# Patient Record
Sex: MALE | Race: WHITE | NOT HISPANIC OR LATINO | Employment: OTHER | URBAN - METROPOLITAN AREA
[De-identification: names, ages, dates, MRNs, and addresses within clinical notes are randomized per-mention and may not be internally consistent; named-entity substitution may affect disease eponyms.]

---

## 2017-03-18 DIAGNOSIS — Z00.00 ENCOUNTER FOR ROUTINE ADULT HEALTH EXAMINATION WITHOUT ABNORMAL FINDINGS: ICD-10-CM

## 2017-03-20 RX ORDER — ATORVASTATIN CALCIUM 40 MG/1
TABLET, FILM COATED ORAL
Qty: 90 TABLET | Refills: 0 | Status: SHIPPED | OUTPATIENT
Start: 2017-03-20 | End: 2017-03-28

## 2017-03-20 NOTE — TELEPHONE ENCOUNTER
Lipitor      Last Written Prescription Date: 08/05/2016  Last Fill Quantity: 90, # refills: 1  Last Office Visit with [facility] or [facility] prescribing provider: 11/22/2016       Lab Results   Component Value Date    CHOL 108 10/21/2016     Lab Results   Component Value Date    HDL 37 10/21/2016     Lab Results   Component Value Date    LDL 53 10/21/2016     Lab Results   Component Value Date    TRIG 89 10/21/2016     No results found for: CHOLHDLRATIO

## 2017-03-22 ENCOUNTER — TELEPHONE (OUTPATIENT)
Dept: FAMILY MEDICINE | Facility: CLINIC | Age: 52
End: 2017-03-22

## 2017-03-22 DIAGNOSIS — I10 BENIGN ESSENTIAL HYPERTENSION: Primary | ICD-10-CM

## 2017-03-22 PROCEDURE — 83036 HEMOGLOBIN GLYCOSYLATED A1C: CPT | Performed by: FAMILY MEDICINE

## 2017-03-22 PROCEDURE — 36415 COLL VENOUS BLD VENIPUNCTURE: CPT | Performed by: FAMILY MEDICINE

## 2017-03-22 PROCEDURE — 80061 LIPID PANEL: CPT | Performed by: FAMILY MEDICINE

## 2017-03-22 NOTE — TELEPHONE ENCOUNTER
LS,  Pt came in to lab today requesting labs for upcoming apt with you 3/28, requesting lipids and A1C/glucose- labs started.  Per lab, the blood that they larry is only good until Friday.  Please review/order/advise.  Thanks,  Jocelin Mckeon RN

## 2017-03-23 LAB — HBA1C MFR BLD: 6.1 % (ref 4.3–6)

## 2017-03-24 LAB
CHOLEST SERPL-MCNC: 128 MG/DL
HDLC SERPL-MCNC: 49 MG/DL
LDLC SERPL CALC-MCNC: 55 MG/DL
NONHDLC SERPL-MCNC: 79 MG/DL
TRIGL SERPL-MCNC: 119 MG/DL

## 2017-03-28 ENCOUNTER — OFFICE VISIT (OUTPATIENT)
Dept: FAMILY MEDICINE | Facility: CLINIC | Age: 52
End: 2017-03-28
Payer: COMMERCIAL

## 2017-03-28 VITALS
HEART RATE: 72 BPM | HEIGHT: 72 IN | DIASTOLIC BLOOD PRESSURE: 85 MMHG | SYSTOLIC BLOOD PRESSURE: 126 MMHG | TEMPERATURE: 96.2 F | WEIGHT: 281 LBS | RESPIRATION RATE: 16 BRPM | BODY MASS INDEX: 38.06 KG/M2 | OXYGEN SATURATION: 97 %

## 2017-03-28 DIAGNOSIS — F17.200 TOBACCO USE DISORDER: Primary | ICD-10-CM

## 2017-03-28 DIAGNOSIS — Z00.00 ENCOUNTER FOR ROUTINE ADULT HEALTH EXAMINATION WITHOUT ABNORMAL FINDINGS: ICD-10-CM

## 2017-03-28 PROCEDURE — 99214 OFFICE O/P EST MOD 30 MIN: CPT | Performed by: FAMILY MEDICINE

## 2017-03-28 NOTE — NURSING NOTE
Chief Complaint   Patient presents with     Lipids     /85  Pulse 72  Temp 96.2  F (35.7  C) (Oral)  Resp 16  Ht 6' (1.829 m)  Wt 281 lb (127.5 kg)  SpO2 97%  BMI 38.11 kg/m2 Estimated body mass index is 38.11 kg/(m^2) as calculated from the following:    Height as of this encounter: 6' (1.829 m).    Weight as of this encounter: 281 lb (127.5 kg).  bp completed using cuff size: large       Health Maintenance addressed:  NONE    n/a    Izabel Ashley MA

## 2017-03-28 NOTE — MR AVS SNAPSHOT
After Visit Summary   3/28/2017    Martin Anguiano    MRN: [id number]           Patient Information     Date Of Birth          1965        Visit Information        Provider Department      3/28/2017 12:30 PM Johana Tamez MD [facility]        Today's Diagnoses     Tobacco use disorder    -  1    Encounter for routine adult health examination without abnormal findings           Follow-ups after your visit        Who to contact     If you have questions or need follow up information about today's clinic visit or your schedule please contact [facility] directly at [phone].  Normal or non-critical lab and imaging results will be communicated to you by [employer] Connect[employer], letter or phone within 4 business days after the clinic has received the results. If you do not hear from us within 7 days, please contact the clinic through [employer] or phone. If you have a critical or abnormal lab result, we will notify you by phone as soon as possible.  Submit refill requests through [employer] or call your pharmacy and they will forward the refill request to us. Please allow 3 business days for your refill to be completed.          Additional Information About Your Visit        MyChart Information     [employer] gives you secure access to your electronic health record. If you see a primary care provider, you can also send messages to your care team and make appointments. If you have questions, please call your primary care clinic.  If you do not have a primary care provider, please call [phone] and they will assist you.        Care EveryWhere ID     This is your Care EveryWhere ID. This could be used by other organizations to access your [facility] medical records  [id number]        Your Vitals Were     Pulse Temperature Respirations Height Pulse Oximetry BMI (Body Mass Index)    72 96.2  F (35.7  C) (Oral) 16 6' (1.829 m) 97% 38.11 kg/m2       Blood Pressure from Last 3 Encounters:   03/28/17 126/85    11/22/16 126/70   10/25/16 128/76    Weight from Last 3 Encounters:   03/28/17 281 lb (127.5 kg)   11/22/16 285 lb (129.3 kg)   10/25/16 290 lb (131.5 kg)              Today, you had the following     No orders found for display         Today's Medication Changes          These changes are accurate as of: 3/28/17 11:59 PM.  If you have any questions, ask your nurse or doctor.               Start taking these medicines.        Dose/Directions    varenicline 0.5 MG X 11 & 1 MG X 42 tablet   Commonly known as:  CHANTIX STARTING MONTH PAK   Used for:  Tobacco use disorder   Started by:  Johana Tamez MD        Take 0.5 mg tab daily for 3 days, then 0.5 mg tab twice daily for 4 days, then 1 mg twice daily.   Quantity:  53 tablet   Refills:  0         These medicines have changed or have updated prescriptions.        Dose/Directions    atorvastatin 40 MG tablet   Commonly known as:  LIPITOR   This may have changed:  See the new instructions.   Used for:  Encounter for routine adult health examination without abnormal findings   Changed by:  Johana Tamez MD        Dose:  40 mg   Take 1 tablet (40 mg) by mouth daily   Quantity:  90 tablet   Refills:  1            Where to get your medicines      These [facility] [address] [address] [facility] & [address] [address]     Phone:  [phone]     atorvastatin 40 MG tablet    varenicline 0.5 MG X 11 & 1 MG X 42 tablet                Primary Care Provider Office Phone # Fax #    Johana Tamez [phone]       [facility] [address] [facility] [address] [place] [id number]        Thank you!     Thank you for choosing [facility]  for your care. Our goal is always to provide you with excellent care. Hearing back from our patients is one way we can continue to improve our services. Please take a few minutes to complete the written survey that you may  receive in the mail after your visit with us. Thank you!             Your Updated Medication List - Protect others around you: Learn how to safely use, store and throw away your medicines at www.disposemymeds.org.          This list is accurate as of: 3/28/17 11:59 PM.  Always use your most recent med list.                   Brand Name Dispense Instructions for use    atorvastatin 40 MG tablet    LIPITOR    90 tablet    Take 1 tablet (40 mg) by mouth daily       CRESTOR 10 MG tablet   Generic drug:  rosuvastatin      Take 20 mg by mouth daily       lisinopril 20 MG tablet    PRINIVIL/ZESTRIL         Multi-vitamin Tabs tablet      Take 1 tablet by mouth daily       order for DME      DREAMSTATIO 5-15 CM/H20 NASAL WISP FABRIC       varenicline 0.5 MG X 11 & 1 MG X 42 tablet    CHANTIX STARTING MONTH CLEMENTINA    53 tablet    Take 0.5 mg tab daily for 3 days, then 0.5 mg tab twice daily for 4 days, then 1 mg twice daily.

## 2017-03-28 NOTE — PROGRESS NOTES
SUBJECTIVE:                                                    Martin Anguiano is a 51 year old male who presents to clinic today for the following health issues:      Hyperlipidemia Follow-Up is on 40mg of Lipitor and doing well, no side effects       Rate your low fat/cholesterol diet?: good    Taking statin?  No    Other lipid medications/supplements?:  none       Amount of exercise or physical activity: 4-5 days/week for an average of 30-45 minutes    Problems taking medications regularly: No    Medication side effects: none    Diet: low fat/cholesterol and low sugar           Problem list and histories reviewed & adjusted, as indicated.  Additional history: as documented    Patient Active Problem List   Diagnosis     Benign essential hypertension     JOJO (obstructive sleep apnea)     Family history of diabetes mellitus     Glucose intolerance (impaired glucose tolerance)     Non morbid obesity due to excess calories     Screening for prostate cancer     Mixed hyperlipidemia     Tobacco use disorder [age]y/o @ 1.5ppd for 15 yrs then 1ppd=31-32 pk yr hx     Chronic coronary artery disease     Impotence of organic origin     Family history of coronary artery disease     Gastroesophageal reflux disease     History of vasectomy     Hypercholesterolemia     Vitamin D deficiency     Cellulitis and abscess of leg-healing and closing      Past Surgical History:   Procedure Laterality Date     NO HISTORY OF SURGERY         Social History   Substance Use Topics     Smoking status: Former Smoker     Packs/day: 1.00     Years: 20.00     Types: Cigarettes     Quit date: 10/4/2016     Smokeless tobacco: Never Used      Comment: last 10 years were off and on, about 1/2016     Alcohol use 0.0 oz/week     0 Standard drinks or equivalent per week      Comment: 1-2 times per week, 2 drinks     Family History   Problem Relation Age of Onset     DIABETES Mother      DIABETES Brother      Hypertension Father      Hyperlipidemia Father   "    Coronary Artery Disease No family hx of      CEREBROVASCULAR DISEASE No family hx of      Breast Cancer No family hx of      Colon Cancer No family hx of      Prostate Cancer No family hx of      Other Cancer No family hx of      Depression No family hx of      Anxiety Disorder No family hx of      MENTAL ILLNESS No family hx of      Substance Abuse No family hx of      Anesthesia Reaction No family hx of      Asthma No family hx of      OSTEOPOROSIS No family hx of      Genetic Disorder No family hx of      Thyroid Disease No family hx of      Obesity No family hx of      Unknown/Adopted No family hx of          Current Outpatient Prescriptions   Medication Sig Dispense Refill     varenicline (CHANTIX STARTING MONTH PAK) 0.5 MG X 11 & 1 MG X 42 tablet Take 0.5 mg tab daily for 3 days, then 0.5 mg tab twice daily for 4 days, then 1 mg twice daily. 53 tablet 0     atorvastatin (LIPITOR) 40 MG tablet TAKE 1 TABLET BY MOUTH EVERY DAY. 90 tablet 0     order for DME DREAMSTATIO  5-15 CM/H20  NASAL WISP FABRIC       multivitamin, therapeutic with minerals (MULTI-VITAMIN) TABS Take 1 tablet by mouth daily       lisinopril (PRINIVIL,ZESTRIL) 20 MG tablet   5     rosuvastatin (CRESTOR) 10 MG tablet Take 20 mg by mouth daily        Allergies   Allergen Reactions     Ibuprofen      Other reaction(s): Other - Describe In Comment Field  Uri type of symptoms; \"watery eyes\". Gel caps are  OK  Eyes water,runny nose     Lisinopril Cough     Recent Labs   Lab Test  03/22/17   1145  10/21/16   1201  03/08/16   0944   A1C  6.1*  6.3*  5.8   LDL  55  53  61   HDL  49  37*  39*   TRIG  119  89  179*   ALT   --   63  64   CR   --   1.13  0.97   GFRESTIMATED   --   69  83   GFRESTBLACK   --   83  >90   GFR Calc     POTASSIUM   --   4.4  4.3      BP Readings from Last 3 Encounters:   03/28/17 126/85   11/22/16 126/70   10/25/16 128/76    Wt Readings from Last 3 Encounters:   03/28/17 281 lb (127.5 kg)   11/22/16 285 lb " (129.3 kg)   10/25/16 290 lb (131.5 kg)                  Labs reviewed in EPIC    Reviewed and updated as needed this visit by clinical staff  Tobacco  Allergies  Meds  Med Hx  Surg Hx  Fam Hx  Soc Hx      Reviewed and updated as needed this visit by Provider         ROS:  Constitutional, HEENT, cardiovascular, pulmonary, GI, , musculoskeletal, neuro, skin, endocrine and psych systems are negative, except as otherwise noted.    OBJECTIVE:                                                    /85  Pulse 72  Temp 96.2  F (35.7  C) (Oral)  Resp 16  Ht 6' (1.829 m)  Wt 281 lb (127.5 kg)  SpO2 97%  BMI 38.11 kg/m2  Body mass index is 38.11 kg/(m^2).  GENERAL: healthy, alert and no distress  EYES: Eyes grossly normal to inspection, PERRL and conjunctivae and sclerae normal  NECK: no adenopathy, no asymmetry, masses, or scars and thyroid normal to palpation  RESP: lungs clear to auscultation - no rales, rhonchi or wheezes  CV: regular rate and rhythm, normal S1 S2, no S3 or S4, no murmur, click or rub, no peripheral edema and peripheral pulses strong  ABDOMEN: soft, nontender, no hepatosplenomegaly, no masses and bowel sounds normal  MS: no gross musculoskeletal defects noted, no edema  NEURO: Normal strength and tone, mentation intact and speech normal    Diagnostic Test Results:  Results for orders placed or performed in visit on 03/22/17   Lipid Profile with reflex to direct LDL   Result Value Ref Range    Cholesterol 128 <200 mg/dL    Triglycerides 119 <150 mg/dL    HDL Cholesterol 49 >39 mg/dL    LDL Cholesterol Calculated 55 <100 mg/dL    Non HDL Cholesterol 79 <130 mg/dL   Hemoglobin A1c   Result Value Ref Range    Hemoglobin A1C 6.1 (H) 4.3 - 6.0 %        ASSESSMENT/PLAN:                                                            1. Tobacco use disorder  We discussed quitting smoking , he will start the chantix, he has done it before with chantix and worked well,   - varenicline (CHANTIX STARTING  MONTH CLEMENTINA) 0.5 MG X 11 & 1 MG X 42 tablet; Take 0.5 mg tab daily for 3 days, then 0.5 mg tab twice daily for 4 days, then 1 mg twice daily.  Dispense: 53 tablet; Refill: 0    2. Encounter for routine adult health examination without abnormal findings  We went over his labs lipids , will continue with the lipitor ,   - atorvastatin (LIPITOR) 40 MG tablet;   Dispense: 90 tablet; Refill: 0    He has stopped his HTN meds, seems that with diet and exercise he is able to maintain it low , also now that he is willing to quit smoking that will help too, will need to check his BP daily and bring the log book with him at next appointment , in three months ,   RTC if no improving or worsening.    Johana Tamez MD  [facility]

## 2017-03-29 RX ORDER — ATORVASTATIN CALCIUM 40 MG/1
40 TABLET, FILM COATED ORAL DAILY
Qty: 90 TABLET | Refills: 1 | Status: SHIPPED | OUTPATIENT
Start: 2017-03-29 | End: 2018-01-03

## 2017-04-06 ENCOUNTER — TELEPHONE (OUTPATIENT)
Dept: FAMILY MEDICINE | Facility: CLINIC | Age: 52
End: 2017-04-06

## 2017-04-06 NOTE — TELEPHONE ENCOUNTER
Initiated prior auth by filling out form for Chantix.  Form faxed and sent for scanning.   [employer] ph# [phone]  ID# [id number]  [facility] [address] ph# [phone]    Status:  Awaiting response.

## 2017-04-10 NOTE — TELEPHONE ENCOUNTER
[employer] approved coverage of Chantix Starting Cosme through 9/22/2017.  Pharmacy notified.  Letter sent for scanning.

## 2017-05-04 ENCOUNTER — TELEPHONE (OUTPATIENT)
Dept: FAMILY MEDICINE | Facility: CLINIC | Age: 52
End: 2017-05-04

## 2017-05-04 DIAGNOSIS — F17.200 TOBACCO USE DISORDER: Primary | ICD-10-CM

## 2017-05-04 RX ORDER — VARENICLINE TARTRATE 1 MG/1
1 TABLET, FILM COATED ORAL 2 TIMES DAILY
Qty: 56 TABLET | Refills: 2 | Status: SHIPPED | OUTPATIENT
Start: 2017-05-04 | End: 2018-05-31

## 2017-05-04 NOTE — TELEPHONE ENCOUNTER
Reason for Call:  Medication or medication refill:    Do you use a [facility]?  Name of the pharmacy and phone number for the current request:       [facility] - [address] [facility] AT [facility] [employer] [place]      Name of the medication requested: Chantix 21mg, 14mg 7mg    Other request: Please call once approved    Can we leave a detailed message on this number? Yes    Phone number patient can be reached at: Home number on file [phone] (home)    Best Time: anytime    Call taken on 5/4/2017 at 8:26 AM by David Mcgill

## 2017-05-04 NOTE — TELEPHONE ENCOUNTER
LS - FYI  Patient called.  Chantix working well. Hasn't smoked in 3 weeks!  Sent in Chantix continuation Rx.  Thanks, Christina Guo RN

## 2017-05-16 NOTE — TELEPHONE ENCOUNTER
[facility] approved coverage of Chantix Continuing sofya through 9/28/2017.  Case # [id number]  [facility] phone # [phone]

## 2017-05-24 ENCOUNTER — CARE COORDINATION (OUTPATIENT)
Dept: CARE COORDINATION | Facility: CLINIC | Age: 52
End: 2017-05-24

## 2017-05-24 NOTE — PROGRESS NOTES
Clinic Care Coordination Contact    Situation: Patient chart reviewed by care coordinator.    Background: Proactive outreach via IHP report    Assessment: 52 year old male with obstructive sleep apnea, hypertension and history of diabetes in his family.  Patient has had not ED visits or inpatient stays in last several years. Follow with PCP at [facility] clinic on a regular basis.      Plan/Recommendations: Clinic care coordination is not indicated at this time.      Fatoumata Orantes RN, CCM - Care Coordinator     5/24/2017    4:02 PM  [phone]

## 2017-06-21 DIAGNOSIS — F17.200 TOBACCO USE DISORDER: ICD-10-CM

## 2017-06-21 DIAGNOSIS — Z00.00 ENCOUNTER FOR ROUTINE ADULT HEALTH EXAMINATION WITHOUT ABNORMAL FINDINGS: ICD-10-CM

## 2017-06-22 RX ORDER — ATORVASTATIN CALCIUM 40 MG/1
TABLET, FILM COATED ORAL
Refills: 0
Start: 2017-06-22

## 2017-06-22 NOTE — TELEPHONE ENCOUNTER
Atorvastatin     Last Written Prescription Date: 3-29-17  Last Fill Quantity: 90, # refills: 1  [facility] or [facility] prescribing provider: 3-28-17       Lab Results   Component Value Date    CHOL 128 03/22/2017     Lab Results   Component Value Date    HDL 49 03/22/2017     Lab Results   Component Value Date    LDL 55 03/22/2017     Lab Results   Component Value Date    TRIG 119 03/22/2017     No results found for: CHOLHDLRATIO    Chantix     Last Written Prescription Date: 3-28-17  Last Fill Quantity: 53, # refills: 0  Last Office Visit with [facility] or [facility] prescribing provider:  3-28-17     BP Readings from Last 3 Encounters:   03/28/17 126/85   11/22/16 126/70   10/25/16 128/76

## 2017-11-06 DIAGNOSIS — G47.33 OSA (OBSTRUCTIVE SLEEP APNEA): Primary | ICD-10-CM

## 2018-01-03 DIAGNOSIS — Z00.00 ENCOUNTER FOR ROUTINE ADULT HEALTH EXAMINATION WITHOUT ABNORMAL FINDINGS: ICD-10-CM

## 2018-01-04 RX ORDER — ATORVASTATIN CALCIUM 40 MG/1
TABLET, FILM COATED ORAL
Start: 2018-01-04

## 2018-01-04 RX ORDER — ATORVASTATIN CALCIUM 40 MG/1
TABLET, FILM COATED ORAL
Qty: 90 TABLET | Refills: 0 | Status: SHIPPED | OUTPATIENT
Start: 2018-01-04 | End: 2018-04-07

## 2018-01-04 NOTE — TELEPHONE ENCOUNTER
Prescription approved per [facility] Refill Protocol.  Erika DEAN RN    Requested Prescriptions   Pending Prescriptions Disp Refills     atorvastatin (LIPITOR) 40 MG tablet [Pharmacy Med Name: ATORVASTATIN 40MG TABLETS] 90 tablet 0     Sig: TAKE 1 TABLET(40 MG) BY MOUTH DAILY    Statins Protocol Passed    1/3/2018  7:39 PM       Passed - LDL on file in past 12 months    Recent Labs   Lab Test  03/22/17   1145   LDL  55            Passed - No abnormal creatine kinase in past 12 months    No lab results found.         Passed - Recent or future visit with authorizing provider    Patient had office visit in the last year or has a visit in the next 30 days with authorizing provider.  See chart review.              Passed - Patient is age 18 or older        atorvastatin (LIPITOR) 40 MG tablet [Pharmacy Med Name: ATORVASTATIN 40MG TABLETS] 90 tablet 0     Sig: TAKE 1 TABLET(40 MG) BY MOUTH DAILY    Statins Protocol Passed    1/3/2018  7:39 PM       Passed - LDL on file in past 12 months    Recent Labs   Lab Test  03/22/17   1145   LDL  55            Passed - No abnormal creatine kinase in past 12 months    No lab results found.         Passed - Recent or future visit with authorizing provider    Patient had office visit in the last year or has a visit in the next 30 days with authorizing provider.  See chart review.              Passed - Patient is age 18 or older

## 2018-04-07 DIAGNOSIS — Z00.00 ENCOUNTER FOR ROUTINE ADULT HEALTH EXAMINATION WITHOUT ABNORMAL FINDINGS: ICD-10-CM

## 2018-04-09 RX ORDER — ATORVASTATIN CALCIUM 40 MG/1
TABLET, FILM COATED ORAL
Qty: 30 TABLET | Refills: 0 | Status: SHIPPED | OUTPATIENT
Start: 2018-04-09 | End: 2018-05-08

## 2018-04-09 NOTE — TELEPHONE ENCOUNTER
"Requested Prescriptions   Pending Prescriptions Disp Refills     atorvastatin (LIPITOR) 40 MG tablet [Pharmacy Med Name: ATORVASTATIN 40MG TABLETS] 90 tablet 0     Sig: TAKE 1 TABLET(40 MG) BY MOUTH DAILY    Statins Protocol Failed    4/7/2018  5:31 PM       Failed - LDL on file in past 12 months    Recent Labs   Lab Test  03/22/17   1145   LDL  55            Failed - Recent (12 mo) or future (30 days) visit within the authorizing provider's specialty    Patient had office visit in the last 12 months or has a visit in the next 30 days with authorizing provider or within the authorizing provider's specialty.  See \"Patient Info\" tab in inbasket, or \"Choose Columns\" in Meds & Orders section of the refill encounter.           Passed - No abnormal creatine kinase in past 12 months    No lab results found.            Passed - Patient is age 18 or older        "

## 2018-04-09 NOTE — TELEPHONE ENCOUNTER
Medication is being filled for 1 time refill only due to:  Patient needs to be seen because it has been more than one year since last visit.   Due for fasting physical  Erika DEAN RN

## 2018-04-20 DIAGNOSIS — Z00.00 ENCOUNTER FOR ROUTINE ADULT HEALTH EXAMINATION WITHOUT ABNORMAL FINDINGS: ICD-10-CM

## 2018-04-20 RX ORDER — ATORVASTATIN CALCIUM 40 MG/1
TABLET, FILM COATED ORAL
Refills: 0
Start: 2018-04-20

## 2018-04-20 NOTE — TELEPHONE ENCOUNTER
Was given 30 on 4/9. Due for due for appointment (fasting physical).  Added in pharm comments.  Jocelin Mckeon RN

## 2018-05-08 ENCOUNTER — OFFICE VISIT (OUTPATIENT)
Dept: FAMILY MEDICINE | Facility: CLINIC | Age: 53
End: 2018-05-08
Payer: COMMERCIAL

## 2018-05-08 VITALS
BODY MASS INDEX: 39.62 KG/M2 | WEIGHT: 283 LBS | TEMPERATURE: 98 F | DIASTOLIC BLOOD PRESSURE: 88 MMHG | HEART RATE: 61 BPM | SYSTOLIC BLOOD PRESSURE: 156 MMHG | OXYGEN SATURATION: 98 % | HEIGHT: 71 IN | RESPIRATION RATE: 16 BRPM

## 2018-05-08 DIAGNOSIS — Z83.3 FAMILY HISTORY OF DIABETES MELLITUS: ICD-10-CM

## 2018-05-08 DIAGNOSIS — Z00.00 ENCOUNTER FOR ROUTINE ADULT HEALTH EXAMINATION WITHOUT ABNORMAL FINDINGS: ICD-10-CM

## 2018-05-08 DIAGNOSIS — R05.9 COUGH: ICD-10-CM

## 2018-05-08 DIAGNOSIS — I10 BENIGN ESSENTIAL HYPERTENSION: Primary | ICD-10-CM

## 2018-05-08 DIAGNOSIS — Z71.6 ENCOUNTER FOR SMOKING CESSATION COUNSELING: ICD-10-CM

## 2018-05-08 DIAGNOSIS — E78.00 HYPERCHOLESTEROLEMIA: ICD-10-CM

## 2018-05-08 LAB
ALBUMIN SERPL-MCNC: 4.3 G/DL (ref 3.4–5)
ALP SERPL-CCNC: 86 U/L (ref 40–150)
ALT SERPL W P-5'-P-CCNC: 68 U/L (ref 0–70)
ANION GAP SERPL CALCULATED.3IONS-SCNC: 7 MMOL/L (ref 3–14)
AST SERPL W P-5'-P-CCNC: 38 U/L (ref 0–45)
BILIRUB SERPL-MCNC: 0.8 MG/DL (ref 0.2–1.3)
BUN SERPL-MCNC: 13 MG/DL (ref 7–30)
CALCIUM SERPL-MCNC: 9.3 MG/DL (ref 8.5–10.1)
CHLORIDE SERPL-SCNC: 106 MMOL/L (ref 94–109)
CHOLEST SERPL-MCNC: 121 MG/DL
CO2 SERPL-SCNC: 26 MMOL/L (ref 20–32)
CREAT SERPL-MCNC: 0.98 MG/DL (ref 0.66–1.25)
GFR SERPL CREATININE-BSD FRML MDRD: 80 ML/MIN/1.7M2
GLUCOSE SERPL-MCNC: 123 MG/DL (ref 70–99)
HBA1C MFR BLD: 6.3 % (ref 0–5.6)
HDLC SERPL-MCNC: 40 MG/DL
LDLC SERPL CALC-MCNC: 55 MG/DL
NONHDLC SERPL-MCNC: 81 MG/DL
POTASSIUM SERPL-SCNC: 4.4 MMOL/L (ref 3.4–5.3)
PROT SERPL-MCNC: 7.8 G/DL (ref 6.8–8.8)
SODIUM SERPL-SCNC: 139 MMOL/L (ref 133–144)
TRIGL SERPL-MCNC: 132 MG/DL

## 2018-05-08 PROCEDURE — 83036 HEMOGLOBIN GLYCOSYLATED A1C: CPT | Performed by: FAMILY MEDICINE

## 2018-05-08 PROCEDURE — 36415 COLL VENOUS BLD VENIPUNCTURE: CPT | Performed by: FAMILY MEDICINE

## 2018-05-08 PROCEDURE — 99214 OFFICE O/P EST MOD 30 MIN: CPT | Performed by: FAMILY MEDICINE

## 2018-05-08 PROCEDURE — 80053 COMPREHEN METABOLIC PANEL: CPT | Performed by: FAMILY MEDICINE

## 2018-05-08 PROCEDURE — 80061 LIPID PANEL: CPT | Performed by: FAMILY MEDICINE

## 2018-05-08 RX ORDER — ALBUTEROL SULFATE 90 UG/1
2 AEROSOL, METERED RESPIRATORY (INHALATION) EVERY 6 HOURS PRN
Qty: 1 INHALER | Refills: 0 | Status: SHIPPED | OUTPATIENT
Start: 2018-05-08 | End: 2018-05-26

## 2018-05-08 RX ORDER — LOSARTAN POTASSIUM 50 MG/1
50 TABLET ORAL DAILY
Qty: 30 TABLET | Refills: 2 | Status: SHIPPED | OUTPATIENT
Start: 2018-05-08 | End: 2018-05-25

## 2018-05-08 RX ORDER — ATORVASTATIN CALCIUM 40 MG/1
TABLET, FILM COATED ORAL
Qty: 90 TABLET | Refills: 1 | Status: SHIPPED | OUTPATIENT
Start: 2018-05-08 | End: 2018-11-14

## 2018-05-08 NOTE — PROGRESS NOTES
SUBJECTIVE:   Martin Anguiano is a 52 year old male who presents to clinic today for the following health issues:      Medication Followup of Lipitor     Taking Medication as prescribed: yes    Side Effects:  None    Medication Helping Symptoms:  Unsure    He has been on Lipitor 40mg and here for recheck fasting lipids   Patient would like a refill of the Chantix.     2) HTN , seems that BP is high again , he usually does not check at home , not sure how it is at home , he quit taking his lisinopril a year ago becUSE OF COUGH and did not start another at that time .  3) smoking cessation , he quit on Chantix a year ago and weas off cigarettes for a while and then re started four months agpo , smokes about half a pack per day but wants to quit again , had success with the Chantix and no side effects so wants to try it again   4) FM of Dm and needs to check glucose and HGb a 1 C   Problem list and histories reviewed & adjusted, as indicated.  Additional history: as documented    Patient Active Problem List   Diagnosis     Benign essential hypertension     JOJO (obstructive sleep apnea)     Family history of diabetes mellitus     Glucose intolerance (impaired glucose tolerance)     Non morbid obesity due to excess calories     Screening for prostate cancer     Mixed hyperlipidemia     Tobacco use disorder [age]y/o @ 1.5ppd for 15 yrs then 1ppd=31-32 pk yr hx     Chronic coronary artery disease     Impotence of organic origin     Family history of coronary artery disease     Gastroesophageal reflux disease     History of vasectomy     Hypercholesterolemia     Vitamin D deficiency     Cellulitis and abscess of leg-healing and closing      Past Surgical History:   Procedure Laterality Date     NO HISTORY OF SURGERY         Social History   Substance Use Topics     Smoking status: Former Smoker     Packs/day: 1.00     Years: 20.00     Types: Cigarettes     Quit date: 10/4/2016     Smokeless tobacco: Never Used      Comment:  last 10 years were off and on, about 1/2016     Alcohol use 0.0 oz/week     0 Standard drinks or equivalent per week      Comment: 1-2 times per week, 2 drinks     Family History   Problem Relation Age of Onset     DIABETES Mother      DIABETES Brother      Hypertension Father      Hyperlipidemia Father      Coronary Artery Disease No family hx of      CEREBROVASCULAR DISEASE No family hx of      Breast Cancer No family hx of      Colon Cancer No family hx of      Prostate Cancer No family hx of      Other Cancer No family hx of      Depression No family hx of      Anxiety Disorder No family hx of      MENTAL ILLNESS No family hx of      Substance Abuse No family hx of      Anesthesia Reaction No family hx of      Asthma No family hx of      OSTEOPOROSIS No family hx of      Genetic Disorder No family hx of      Thyroid Disease No family hx of      Obesity No family hx of      Unknown/Adopted No family hx of          Current Outpatient Prescriptions   Medication Sig Dispense Refill     albuterol (PROAIR HFA/PROVENTIL HFA/VENTOLIN HFA) 108 (90 Base) MCG/ACT Inhaler Inhale 2 puffs into the lungs every 6 hours as needed for shortness of breath / dyspnea or wheezing 1 Inhaler 0     atorvastatin (LIPITOR) 40 MG tablet TAKE 1 TABLET(40 MG) BY MOUTH DAILY 90 tablet 1     losartan (COZAAR) 50 MG tablet Take 1 tablet (50 mg) by mouth daily 30 tablet 2     multivitamin, therapeutic with minerals (MULTI-VITAMIN) TABS Take 1 tablet by mouth daily       rosuvastatin (CRESTOR) 10 MG tablet Take 20 mg by mouth daily        varenicline (CHANTIX STARTING MONTH CLEMENTINA) 0.5 MG X 11 & 1 MG X 42 tablet Take 0.5 mg tab daily for 3 days, then 0.5 mg tab twice daily for 4 days, then 1 mg twice daily. 53 tablet 2     lisinopril (PRINIVIL,ZESTRIL) 20 MG tablet   5     order for DME DREAMSTATIO  5-15 CM/H20  NASAL WISP FABRIC       varenicline (CHANTIX STARTING MONTH CLEMENTINA) 0.5 MG X 11 & 1 MG X 42 tablet Take 0.5 mg tab daily for 3 days, then 0.5  "mg tab twice daily for 4 days, then 1 mg twice daily. (Patient not taking: Reported on 5/8/2018) 53 tablet 0     varenicline (CHANTIX) 1 MG tablet Take 1 tablet (1 mg) by mouth 2 times daily (Patient not taking: Reported on 5/8/2018) 56 tablet 2     [DISCONTINUED] atorvastatin (LIPITOR) 40 MG tablet TAKE 1 TABLET(40 MG) BY MOUTH DAILY 30 tablet 0     Allergies   Allergen Reactions     Ibuprofen      Other reaction(s): Other - Describe In Comment Field  Uri type of symptoms; \"watery eyes\". Gel caps are  OK  Eyes water,runny nose     Lisinopril Cough     Recent Labs   Lab Test  05/08/18   1132  03/22/17   1145  10/21/16   1201  03/08/16   0944   A1C  6.3*  6.1*  6.3*  5.8   LDL   --   55  53  61   HDL   --   49  37*  39*   TRIG   --   119  89  179*   ALT   --    --   63  64   CR   --    --   1.13  0.97   GFRESTIMATED   --    --   69  83   GFRESTBLACK   --    --   83  >90   GFR Calc     POTASSIUM   --    --   4.4  4.3      BP Readings from Last 3 Encounters:   05/08/18 156/88   03/28/17 126/85   11/22/16 126/70    Wt Readings from Last 3 Encounters:   05/08/18 283 lb (128.4 kg)   03/28/17 281 lb (127.5 kg)   11/22/16 285 lb (129.3 kg)               Labs reviewed in EPIC    Reviewed and updated as needed this visit by clinical staff       Reviewed and updated as needed this visit by Provider         ROS:  Constitutional, HEENT, cardiovascular, pulmonary, GI, , musculoskeletal, neuro, skin, endocrine and psych systems are negative, except as otherwise noted.    OBJECTIVE:     /88 (BP Location: Left arm, Patient Position: Sitting, Cuff Size: Adult Large)  Pulse 61  Temp 98  F (36.7  C) (Oral)  Resp 16  Ht 5' 10.75\" (1.797 m)  Wt 283 lb (128.4 kg)  SpO2 98%  BMI 39.75 kg/m2  Body mass index is 39.75 kg/(m^2).  GENERAL: healthy, alert and no distress  EYES: Eyes grossly normal to inspection, PERRL and conjunctivae and sclerae normal  HENT: ear canals and TM's normal, nose and mouth without " ulcers or lesions  NECK: no adenopathy, no asymmetry, masses, or scars and thyroid normal to palpation  RESP: lungs clear to auscultation - no rales, rhonchi or wheezes  CV: regular rate and rhythm, normal S1 S2, no S3 or S4, no murmur, click or rub, no peripheral edema and peripheral pulses strong  ABDOMEN: soft, nontender, no hepatosplenomegaly, no masses and bowel sounds normal  MS: no gross musculoskeletal defects noted, no edema  NEURO: Normal strength and tone, mentation intact and speech normal    Diagnostic Test Results:  Results for orders placed or performed in visit on 05/08/18 (from the past 24 hour(s))   Hemoglobin A1c   Result Value Ref Range    Hemoglobin A1C 6.3 (H) 0 - 5.6 %       ASSESSMENT/PLAN:       1. Hypercholesterolemia  He is fasting today for recheck of lipids and refilled his lipitor 40mg , will check labs today .  - Lipid Profile (Chol, Trig, HDL, LDL calc)  - Comprehensive metabolic panel (BMP + Alb, Alk Phos, ALT, AST, Total. Bili, TP)  - atorvastatin (LIPITOR) 40 MG tablet; TAKE 1 TABLET(40 MG) BY MOUTH DAILY  Dispense: 90 tablet; Refill: 1    2. Benign essential hypertension  Discussed starting losartan as his BP is high and he has been off lisinopril for a year, would need to start checking his BP at home and come here for a f/u on the bp in two weeks , sooner if any concerns.  - GASTROENTEROLOGY ADULT REF PROCEDURE ONLY [facility] [phone]; No Provider Preference  - Comprehensive metabolic panel (BMP + Alb, Alk Phos, ALT, AST, Total. Bili, TP)  - losartan (COZAAR) 50 MG tablet; Take 1 tablet (50 mg) by mouth daily  Dispense: 30 tablet; Refill: 2    3. Family history of diabetes mellitus  Will check glucose and also HGb A 1 c today , also he is due for the colonoscopy and I have given him the referral .  - GASTROENTEROLOGY ADULT REF PROCEDURE ONLY [facility] [phone]; No Provider Preference  - Hemoglobin A1c    4. Encounter for smoking cessation  counseling  Discussed smoking cessation , he has quit on Chantix in the past and wants to do it again , sent a Rx , would f/u in one month .  - varenicline (CHANTIX STARTING MONTH CLEMENTINA) 0.5 MG X 11 & 1 MG X 42 tablet; Take 0.5 mg tab daily for 3 days, then 0.5 mg tab twice daily for 4 days, then 1 mg twice daily.  Dispense: 53 tablet; Refill: 2    5. Encounter for routine adult health examination without abnormal findings  Refill on his LIpitor and check lipids today   - atorvastatin (LIPITOR) 40 MG tablet; TAKE 1 TABLET(40 MG) BY MOUTH DAILY  Dispense: 90 tablet; Refill: 1    6. Cough  Refill on the Albuterol  - albuterol (PROAIR HFA/PROVENTIL HFA/VENTOLIN HFA) 108 (90 Base) MCG/ACT Inhaler; Inhale 2 puffs into the lungs every 6 hours as needed for shortness of breath / dyspnea or wheezing  Dispense: 1 Inhaler; Refill: 0    RTC if no improving or worsening.  Pt is aware  and comfortable with the current plan.      Johana Tamez MD  [facility]

## 2018-05-08 NOTE — MR AVS SNAPSHOT
After Visit Summary   5/8/2018    Martin Anguiano    MRN: [id number]           Patient Information     Date Of Birth          1965        Visit Information        Provider Department      5/8/2018 11:00 AM Johana Tamez MD [facility]        Today's Diagnoses     Benign essential hypertension    -  1    Hypercholesterolemia        Family history of diabetes mellitus        Encounter for smoking cessation counseling        Encounter for routine adult health examination without abnormal findings           Follow-ups after your visit        Additional Services     GASTROENTEROLOGY ADULT REF PROCEDURE ONLY [facility] [phone]; No Provider Preference       Last Lab Result: Creatinine (mg/dL)       Date                     Value                 10/21/2016               1.13             ----------  Body mass index is 39.75 kg/(m^2).     Needed:  No  Language:  English    Patient will be contacted to schedule procedure.     Please be aware that coverage of these services is subject to the terms and limitations of your health insurance plan.  Call member services at your health plan with any benefit or coverage questions.  Any procedures must be performed at a [place] facility OR coordinated by your clinic's referral office.    Please bring the following with you to your appointment:    (1) Any X-Rays, CTs or MRIs which have been performed.  Contact the facility where they were done to arrange for  prior to your scheduled appointment.    (2) List of current medications   (3) This referral request   (4) Any documents/labs given to you for this referral                  Who to contact     If you have questions or need follow up information about today's clinic visit or your schedule please contact [facility] directly at [phone].  Normal or non-critical lab and imaging results will be communicated to you by MyChart, letter or phone within 4 business  "days after the clinic has received the results. If you do not hear from us within 7 days, please contact the clinic through [employer] or phone. If you have a critical or abnormal lab result, we will notify you by phone as soon as possible.  Submit refill requests through [employer] or call your pharmacy and they will forward the refill request to us. Please allow 3 business days for your refill to be completed.          Additional Information About Your Visit        [employer] Information     [employer] gives you secure access to your electronic health record. If you see a primary care provider, you can also send messages to your care team and make appointments. If you have questions, please call your primary care clinic.  If you do not have a primary care provider, please call [phone] and they will assist you.        Care EveryWhere ID     This is your Care EveryWhere ID. This could be used by other organizations to access your [facility] medical records  [id number]        Your Vitals Were     Pulse Temperature Respirations Height Pulse Oximetry BMI (Body Mass Index)    61 98  F (36.7  C) (Oral) 16 5' 10.75\" (1.797 m) 98% 39.75 kg/m2       Blood Pressure from Last 3 Encounters:   05/08/18 156/88   03/28/17 126/85   11/22/16 126/70    Weight from Last 3 Encounters:   05/08/18 283 lb (128.4 kg)   03/28/17 281 lb (127.5 kg)   11/22/16 285 lb (129.3 kg)              We Performed the Following     Comprehensive metabolic panel (BMP + Alb, Alk Phos, ALT, AST, Total. Bili, TP)     GASTROENTEROLOGY ADULT REF PROCEDURE ONLY [facility] [phone]; No Provider Preference     Hemoglobin A1c     Lipid Profile (Chol, Trig, HDL, LDL calc)          Today's Medication Changes          These changes are accurate as of 5/8/18 11:30 AM.  If you have any questions, ask your nurse or doctor.               Start taking these medicines.        Dose/Directions    losartan 50 MG tablet   Commonly known as:  COZAAR   Used for:  Benign " essential hypertension   Started by:  Johana Tamez MD        Dose:  50 mg   Take 1 tablet (50 mg) by mouth daily   Quantity:  30 tablet   Refills:  2         These medicines have changed or have updated prescriptions.        Dose/Directions    atorvastatin 40 MG tablet   Commonly known as:  LIPITOR   This may have changed:  See the new instructions.   Used for:  Encounter for routine adult health examination without abnormal findings, Hypercholesterolemia   Changed by:  Johana Tamez MD        TAKE 1 TABLET(40 MG) BY MOUTH DAILY   Quantity:  90 tablet   Refills:  1       * varenicline 0.5 MG X 11 & 1 MG X 42 tablet   Commonly known as:  CHANTIX STARTING MONTH CLEMENTINA   This may have changed:  Another medication with the same name was added. Make sure you understand how and when to take each.   Used for:  Tobacco use disorder   Changed by:  Johana Tamez MD        Take 0.5 mg tab daily for 3 days, then 0.5 mg tab twice daily for 4 days, then 1 mg twice daily.   Quantity:  53 tablet   Refills:  0       * varenicline 1 MG tablet   Commonly known as:  CHANTIX   This may have changed:  Another medication with the same name was added. Make sure you understand how and when to take each.   Used for:  Tobacco use disorder   Changed by:  Johana Tamez MD        Dose:  1 mg   Take 1 tablet (1 mg) by mouth 2 times daily   Quantity:  56 tablet   Refills:  2       * varenicline 0.5 MG X 11 & 1 MG X 42 tablet   Commonly known as:  CHANTIX STARTING MONTH CLEMENTINA   This may have changed:  You were already taking a medication with the same name, and this prescription was added. Make sure you understand how and when to take each.   Used for:  Encounter for smoking cessation counseling   Changed by:  Johana Tamez MD        Take 0.5 mg tab daily for 3 days, then 0.5 mg tab twice daily for 4 days, then 1 mg twice daily.   Quantity:  53 tablet   Refills:  2       * Notice:  This list has 3 medication(s) that are the same as other  medications prescribed for you. Read the directions carefully, and ask your doctor or other care provider to review them with you.         Where to get your medicines      These medications were sent to [facility] [id number]  [address] AT [facility] & [place] [address] [place] [address]     Phone:  [phone]     atorvastatin 40 MG tablet    losartan 50 MG tablet    varenicline 0.5 MG X 11 & 1 MG X 42 tablet                Primary Care Provider Office Phone # Fax #    Johana Tamez [phone] [address]   [facility] [address]        Equal Access to Services     RYAN SIN : Hadii solo brooks hadasho Sodotty, waaxda luqadaha, qaybta kaalmada adeegyada, kimi bazan. So [facility] [phone].    ATENCIÓN: Si habla español, tiene a ambrosio disposición servicios gratuitos de asistencia lingüística. Llame al [phone].    We comply with applicable federal civil rights laws and [place] laws. We do not discriminate on the basis of race, color, national origin, age, disability, sex, sexual orientation, or gender identity.            Thank you!     Thank you for choosing [facility]  for your care. Our goal is always to provide you with excellent care. Hearing back from our patients is one way we can continue to improve our services. Please take a few minutes to complete the written survey that you may receive in the mail after your visit with us. Thank you!             Your Updated Medication List - Protect others around you: Learn how to safely use, store and throw away your medicines at www.disposemymeds.org.          This list is accurate as of 5/8/18 11:30 AM.  Always use your most recent med list.                   Brand Name Dispense Instructions for use Diagnosis    atorvastatin 40 MG tablet    LIPITOR    90 tablet    TAKE 1 TABLET(40 MG) BY MOUTH DAILY    Encounter for routine adult health  examination without abnormal findings, Hypercholesterolemia       CRESTOR 10 MG tablet   Generic drug:  rosuvastatin      Take 20 mg by mouth daily        lisinopril 20 MG tablet    PRINIVIL/ZESTRIL          losartan 50 MG tablet    COZAAR    30 tablet    Take 1 tablet (50 mg) by mouth daily    Benign essential hypertension       Multi-vitamin Tabs tablet      Take 1 tablet by mouth daily        order for DME      DREAMSTATIO 5-15 CM/H20 NASAL WISP FABRIC        * varenicline 0.5 MG X 11 & 1 MG X 42 tablet    CHANTIX STARTING MONTH PAK    53 tablet    Take 0.5 mg tab daily for 3 days, then 0.5 mg tab twice daily for 4 days, then 1 mg twice daily.    Tobacco use disorder       * varenicline 1 MG tablet    CHANTIX    56 tablet    Take 1 tablet (1 mg) by mouth 2 times daily    Tobacco use disorder       * varenicline 0.5 MG X 11 & 1 MG X 42 tablet    CHANTIX STARTING MONTH PAK 53 tablet    Take 0.5 mg tab daily for 3 days, then 0.5 mg tab twice daily for 4 days, then 1 mg twice daily.    Encounter for smoking cessation counseling       * Notice:  This list has 3 medication(s) that are the same as other medications prescribed for you. Read the directions carefully, and ask your doctor or other care provider to review them with you.

## 2018-05-25 ENCOUNTER — VIRTUAL VISIT (OUTPATIENT)
Dept: FAMILY MEDICINE | Facility: CLINIC | Age: 53
End: 2018-05-25
Payer: COMMERCIAL

## 2018-05-25 DIAGNOSIS — I10 BENIGN ESSENTIAL HYPERTENSION: ICD-10-CM

## 2018-05-25 DIAGNOSIS — R73.01 ELEVATED FASTING GLUCOSE: Primary | ICD-10-CM

## 2018-05-25 PROCEDURE — 99441 ZZC PHYSICIAN TELEPHONE EVALUATION 5-10 MIN: CPT | Performed by: FAMILY MEDICINE

## 2018-05-25 RX ORDER — LOSARTAN POTASSIUM 50 MG/1
50 TABLET ORAL DAILY
Qty: 30 TABLET | Refills: 2 | Status: SHIPPED | OUTPATIENT
Start: 2018-05-25 | End: 2018-10-09

## 2018-05-25 NOTE — MR AVS SNAPSHOT
After Visit Summary   5/25/2018    Martin Anguiano    MRN: [id number]           Patient Information     Date Of Birth          1965        Visit Information        Provider Department      5/25/2018 1:15 PM Johana Tamez MD [facility]        Today's Diagnoses     Elevated fasting glucose    -  1    Benign essential hypertension           Follow-ups after your visit        Future tests that were ordered for you today     Open Future Orders        Priority Expected Expires Ordered    Hemoglobin A1c Routine  10/25/2018 5/25/2018    Comprehensive metabolic panel (BMP + Alb, Alk Phos, ALT, AST, Total. Bili, TP) Routine  10/25/2018 5/25/2018            Who to contact     If you have questions or need follow up information about today's clinic visit or your schedule please contact [facility] directly at [phone].  Normal or non-critical lab and imaging results will be communicated to you by [employer], letter or phone within 4 business days after the clinic has received the results. If you do not hear from us within 7 days, please contact the clinic through [employer] or phone. If you have a critical or abnormal lab result, we will notify you by phone as soon as possible.  Submit refill requests through [employer] or call your pharmacy and they will forward the refill request to us. Please allow 3 business days for your refill to be completed.          Additional Information About Your Visit        MyChart Information     [employer] gives you secure access to your electronic health record. If you see a primary care provider, you can also send messages to your care team and make appointments. If you have questions, please call your primary care clinic.  If you do not have a primary care provider, please call [phone] and they will assist you.        Care EveryWhere ID     This is your Care EveryWhere ID. This could be used by other organizations to access your [facility]  records  [id number]         Blood Pressure from Last 3 Encounters:   05/08/18 156/88   03/28/17 126/85   11/22/16 126/70    Weight from Last 3 Encounters:   05/08/18 283 lb (128.4 kg)   03/28/17 281 lb (127.5 kg)   11/22/16 285 lb (129.3 kg)                 Where to get your medicines      These medications were sent to [facility] [address] [place] [address] [employer] AT [facility] [employer] & [place] [address] [facility] [address]     Phone:  [phone]     losartan 50 MG tablet          Primary Care Provider Office Phone # Fax #    Johana Tamez [phone] [address] [address]        Equal Access to Services     CAROLYNE SIN : Hadii solo brooks hadasho Sovicali, waaxda luqadaha, qaybta kaalmada adeegyada, kimi dang . So [facility] [phone].    ATENCIÓN: Si habla español, tiene a ambrosio disposición servicios gratuitos de asistencia lingüística. Arturo al [phone].    We comply with applicable federal civil rights laws and [place] laws. We do not discriminate on the basis of race, color, national origin, age, disability, sex, sexual orientation, or gender identity.            Thank you!     Thank you for choosing [facility]  for your care. Our goal is always to provide you with excellent care. Hearing back from our patients is one way we can continue to improve our services. Please take a few minutes to complete the written survey that you may receive in the mail after your visit with us. Thank you!             Your Updated Medication List - Protect others around you: Learn how to safely use, store and throw away your medicines at www.disposemymeds.org.          This list is accurate as of 5/25/18  1:37 PM.  Always use your most recent med list.                   Brand Name Dispense Instructions for use Diagnosis    albuterol 108 (90 Base) MCG/ACT Inhaler    PROAIR HFA/PROVENTIL HFA/VENTOLIN HFA    1 Inhaler     Inhale 2 puffs into the lungs every 6 hours as needed for shortness of breath / dyspnea or wheezing    Cough       atorvastatin 40 MG tablet    LIPITOR    90 tablet    TAKE 1 TABLET(40 MG) BY MOUTH DAILY    Encounter for routine adult health examination without abnormal findings, Hypercholesterolemia       CRESTOR 10 MG tablet   Generic drug:  rosuvastatin      Take 20 mg by mouth daily        lisinopril 20 MG tablet    PRINIVIL/ZESTRIL          losartan 50 MG tablet    COZAAR    30 tablet    Take 1 tablet (50 mg) by mouth daily    Benign essential hypertension       Multi-vitamin Tabs tablet      Take 1 tablet by mouth daily        order for DME      DREAMSTATIO 5-15 CM/H20 NASAL WISP FABRIC        * varenicline 0.5 MG X 11 & 1 MG X 42 tablet    CHANTIX STARTING MONTH CLEMENTINA    53 tablet    Take 0.5 mg tab daily for 3 days, then 0.5 mg tab twice daily for 4 days, then 1 mg twice daily.    Tobacco use disorder       * varenicline 1 MG tablet    CHANTIX    56 tablet    Take 1 tablet (1 mg) by mouth 2 times daily    Tobacco use disorder       * varenicline 0.5 MG X 11 & 1 MG X 42 tablet    CHANTIX STARTING MONTH CLEMENTINA    53 tablet    Take 0.5 mg tab daily for 3 days, then 0.5 mg tab twice daily for 4 days, then 1 mg twice daily.    Encounter for smoking cessation counseling       * Notice:  This list has 3 medication(s) that are the same as other medications prescribed for you. Read the directions carefully, and ask your doctor or other care provider to review them with you.

## 2018-05-25 NOTE — PROGRESS NOTES
"Martin Anguiano is a 53 year old male who is being evaluated via a billable telephone visit.      The patient has been notified of following:     \"This telephone visit will be conducted via a call between you and your physician/provider. We have found that certain health care needs can be provided without the need for a physical exam.  This service lets us provide the care you need with a short phone conversation.  If a prescription is necessary we can send it directly to your pharmacy.  If lab work is needed we can place an order for that and you can then stop by our lab to have the test done at a later time.    We will bill your insurance company for this service.  Please check with your medical insurance if this type of visit is covered. You may be responsible for the cost of this type of visit if insurance coverage is denied.  The typical cost is $30 (10min), $59 (11-20min) and $85 (21-30min).  Most often these visits are shorter than 10 minutes.    If during the course of the call the physician/provider feels a telephone visit is not appropriate, you will not be charged for this service.\"       Consent has been obtained for this service by care team member: yes. See the scanned image in the medical record.  SUBJECTIVE:     Martin Anguiano complains of  No chief complaint on file.      I have reviewed and updated the patient's Past Medical History, Social History, Family History and Medication List.    ALLERGIES  Ibuprofen and Lisinopril    Juli Barros CMA    (MA signature)    Additional provider notes: Discussed pt's labs from last visit and his lipids were normal but his Hgb A 1 C went from 6.1 to 6.3- I suggested we start Metrormin 500mg twice a day and recheck in 6 weeks but pt states that he has started about 4 weeks ago healthy diet and more exercise plan and already has lost ten lbs , so he wants to continue and recheck the labs at the end of summer . Discussed that this seems like a good plan  Also, he would need " to recheck his BP as well as he just started on losartan 50mg , he has checked BP at home and seems to be under 140/90 and he does not have any side effects , so would like to continue on this   Hypertension Follow-up      Outpatient blood pressures are being checked at home.  Results are under 140 over 90 .    Low Salt Diet: not monitoring salt    OBJECTIVE:     1. Benign essential hypertension  Will continue on the losartan 50mg daily and recheck in August here but will check weekly at home to make sure it is still controlled .  - losartan (COZAAR) 50 MG tablet; Take 1 tablet (50 mg) by mouth daily  Dispense: 30 tablet; Refill: 2  - Comprehensive metabolic panel (BMP + Alb, Alk Phos, ALT, AST, Total. Bili, TP); Future    2. Elevated fasting glucose  He has started a diet high in fiber veggies lean meats and is exercising regularly now , will recheck Hgb A 1 C in August and have him f/u with me two days after his labs , sooner if any concerns .  - Hemoglobin A1c; Future    RTC if no improving or worsening.  Pt is aware  and comfortable with the current plan.      I have reviewed the note as documented above.  This accurately captures the substance of my conversation with the patient,      Total time of call between patient and provider was 6 minutes     Juli Tamayo, CMA

## 2018-05-26 DIAGNOSIS — R05.9 COUGH: ICD-10-CM

## 2018-05-29 RX ORDER — ALBUTEROL SULFATE 90 UG/1
AEROSOL, METERED RESPIRATORY (INHALATION)
Qty: 18 G | Refills: 0 | Status: SHIPPED | OUTPATIENT
Start: 2018-05-29 | End: 2018-06-22

## 2018-05-29 NOTE — TELEPHONE ENCOUNTER
"Routing refill request to provider for review/approval because:  D[place] [facility] [place]  Erika DEAN RN    Requested Prescriptions   Pending Prescriptions Disp Refills     VENTOLIN  (90 Base) MCG/ACT Inhaler [Pharmacy Med Name: VENTOLIN HFA INH W/DOS CTR 200PUFFS] 18 g 0     Sig: INHALE 2 PUFFS INTO THE LUNGS EVERY 6 HOURS AS NEEDED FOR SHORTNESS OF BREATH OR DIFFICULT BREATHING OR WHEEZING    Asthma Maintenance Inhalers - Anticholinergics Passed    5/26/2018  5:04 PM       Passed - Patient is age 12 years or older       Passed - Recent (12 mo) or future (30 days) visit within the authorizing provider's specialty    Patient had office visit in the last 12 months or has a visit in the next 30 days with authorizing provider or within the authorizing provider's specialty.  See \"Patient Info\" tab in inbasket, or \"Choose Columns\" in Meds & Orders section of the refill encounter.                    "

## 2018-05-31 DIAGNOSIS — F17.200 TOBACCO USE DISORDER: ICD-10-CM

## 2018-05-31 NOTE — TELEPHONE ENCOUNTER
"Routing refill request to provider for review/approval because:  Starter clementina Rx sent 5/8/2018  Please advise on continuing clementina now  Thanks,  Erika DEAN, RN    Requested Prescriptions   Pending Prescriptions Disp Refills     CHANTIX CONTINUING MONTH CLEMENTINA 1 MG tablet [Pharmacy Med Name: CHANTIX 1MG CONT SINGLE  PACK 56] 56 tablet 0     Sig: TAKE 1 TABLET BY MOUTH TWICE DAILY    Partial Cholinergic Nicotinic Agonist Agents Failed    5/31/2018 10:54 AM       Failed - Blood pressure under 140/90 in past 12 months    BP Readings from Last 3 Encounters:   05/08/18 156/88   03/28/17 126/85   11/22/16 126/70                Passed - Recent (12 mo) or future (30 days) visit within the authorizing provider's specialty    Patient had office visit in the last 12 months or has a visit in the next 30 days with authorizing provider or within the authorizing provider's specialty.  See \"Patient Info\" tab in inbasket, or \"Choose Columns\" in Meds & Orders section of the refill encounter.           Passed - Patient is 18 years of age or older              "

## 2018-06-01 RX ORDER — VARENICLINE TARTRATE 1 MG/1
TABLET, FILM COATED ORAL
Qty: 56 TABLET | Refills: 0 | Status: SHIPPED | OUTPATIENT
Start: 2018-06-01 | End: 2018-10-09

## 2018-06-22 DIAGNOSIS — R05.9 COUGH: ICD-10-CM

## 2018-06-22 NOTE — TELEPHONE ENCOUNTER
"Routing refill request to provider for review/approval because:  Drug not on [place] [facility] [place] for dx of [place]  Erika DEAN RN    Requested Prescriptions   Pending Prescriptions Disp Refills     VENTOLIN  (90 Base) MCG/ACT Inhaler [Pharmacy Med Name: VENTOLIN HFA INH W/DOS CTR 200PUFFS] 18 g 0     Sig: INHALE 2 PUFFS INTO THE LUNGS EVERY 6 HOURS AS NEEDED FOR SHORTNESS OF BREATH OR DIFFICULT BREATHING OR WHEEZING    Asthma Maintenance Inhalers - Anticholinergics Passed    6/22/2018 11:54 AM       Passed - Patient is age 12 years or older       Passed - Recent (12 mo) or future (30 days) visit within the authorizing provider's specialty    Patient had office visit in the last 12 months or has a visit in the next 30 days with authorizing provider or within the authorizing provider's specialty.  See \"Patient Info\" tab in inbasket, or \"Choose Columns\" in Meds & Orders section of the refill encounter.                "

## 2018-06-24 RX ORDER — ALBUTEROL SULFATE 90 UG/1
AEROSOL, METERED RESPIRATORY (INHALATION)
Qty: 18 G | Refills: 5 | Status: ON HOLD | OUTPATIENT
Start: 2018-06-24 | End: 2019-02-28

## 2018-07-31 ENCOUNTER — SURGERY (OUTPATIENT)
Age: 53
End: 2018-07-31

## 2018-07-31 ENCOUNTER — HOSPITAL ENCOUNTER (OUTPATIENT)
Facility: CLINIC | Age: 53
Discharge: HOME | End: 2018-07-31
Attending: SPECIALIST | Admitting: SPECIALIST
Payer: COMMERCIAL

## 2018-07-31 VITALS
OXYGEN SATURATION: 94 % | BODY MASS INDEX: 36.57 KG/M2 | SYSTOLIC BLOOD PRESSURE: 131 MMHG | RESPIRATION RATE: 26 BRPM | WEIGHT: 270 LBS | DIASTOLIC BLOOD PRESSURE: 79 MMHG | HEIGHT: 72 IN

## 2018-07-31 LAB — COLONOSCOPY: NORMAL

## 2018-07-31 PROCEDURE — 25000128 H RX IP 250 OP 636: Performed by: SPECIALIST

## 2018-07-31 PROCEDURE — 88305 TISSUE EXAM BY PATHOLOGIST: CPT | Mod: 26 | Performed by: SPECIALIST

## 2018-07-31 PROCEDURE — G0500 MOD SEDAT ENDO SERVICE >5YRS: HCPCS | Performed by: SPECIALIST

## 2018-07-31 PROCEDURE — 88305 TISSUE EXAM BY PATHOLOGIST: CPT | Performed by: SPECIALIST

## 2018-07-31 PROCEDURE — 45385 COLONOSCOPY W/LESION REMOVAL: CPT | Performed by: SPECIALIST

## 2018-07-31 RX ORDER — FENTANYL CITRATE 50 UG/ML
INJECTION, SOLUTION INTRAMUSCULAR; INTRAVENOUS PRN
Status: DISCONTINUED | OUTPATIENT
Start: 2018-07-31 | End: 2018-07-31 | Stop reason: HOSPADM

## 2018-07-31 RX ORDER — ONDANSETRON 2 MG/ML
4 INJECTION INTRAMUSCULAR; INTRAVENOUS
Status: DISCONTINUED | OUTPATIENT
Start: 2018-07-31 | End: 2018-07-31 | Stop reason: HOSPADM

## 2018-07-31 RX ORDER — LIDOCAINE 40 MG/G
CREAM TOPICAL
Status: DISCONTINUED | OUTPATIENT
Start: 2018-07-31 | End: 2018-07-31 | Stop reason: HOSPADM

## 2018-07-31 RX ADMIN — MIDAZOLAM 2 MG: 1 INJECTION INTRAMUSCULAR; INTRAVENOUS at 07:38

## 2018-07-31 RX ADMIN — FENTANYL CITRATE 100 MCG: 50 INJECTION, SOLUTION INTRAMUSCULAR; INTRAVENOUS at 07:38

## 2018-07-31 RX ADMIN — FENTANYL CITRATE 50 MCG: 50 INJECTION, SOLUTION INTRAMUSCULAR; INTRAVENOUS at 07:43

## 2018-07-31 RX ADMIN — MIDAZOLAM 1 MG: 1 INJECTION INTRAMUSCULAR; INTRAVENOUS at 07:43

## 2018-07-31 NOTE — H&P
Pre-Endoscopy History and Physical     Martin [facility] MRN# [id number]   YOB: 1965 Age: 53 year old     Date of Procedure: 7/31/2018  Primary care provider: Johana Tamez  Type of Endoscopy: Colonoscopy with possible biopsy, possible polypectomy  Reason for Procedure: screening  Type of Anesthesia Anticipated: Conscious Sedation    HPI:    Martin is a 53 year old male who will be undergoing the above procedure.      A history and physical has been performed. The patient's medications and allergies have been reviewed. The risks and benefits of the procedure and the sedation options and risks were discussed with the patient.  All questions were answered and informed consent was obtained.      He denies a personal or family history of anesthesia complications or bleeding disorders.     Patient Active Problem List   Diagnosis     Benign essential hypertension     JOJO (obstructive sleep apnea)     Family history of diabetes mellitus     Glucose intolerance (impaired glucose tolerance)     Non morbid obesity due to excess calories     Screening for prostate cancer     Mixed hyperlipidemia     Tobacco use disorder 19-48y/o @ 1.5ppd for 15 yrs then 1ppd=31-32 pk yr hx     Chronic coronary artery disease     Impotence of organic origin     Family history of coronary artery disease     Gastroesophageal reflux disease     History of vasectomy     Hypercholesterolemia     Vitamin D deficiency     Cellulitis and abscess of leg-healing and closing         Past Medical History:   Diagnosis Date     HTN (hypertension)      Hypercholesteremia         Past Surgical History:   Procedure Laterality Date     NO HISTORY OF SURGERY         Social History   Substance Use Topics     Smoking status: Former Smoker     Packs/day: 1.00     Years: 20.00     Types: Cigarettes     Quit date: 10/4/2016     Smokeless tobacco: Never Used      Comment: last 10 years were off and on, about 1/2016     Alcohol use 0.0 oz/week     0 Standard  drinks or equivalent per week      Comment: 1-2 times per week, 2 drinks       Family History   Problem Relation Age of Onset     Diabetes Mother      Diabetes Brother      Hypertension Father      Hyperlipidemia Father      Coronary Artery Disease No family hx of      Cerebrovascular Disease No family hx of      Breast Cancer No family hx of      Colon Cancer No family hx of      Prostate Cancer No family hx of      Other Cancer No family hx of      Depression No family hx of      Anxiety Disorder No family hx of      Mental Illness No family hx of      Substance Abuse No family hx of      Anesthesia Reaction No family hx of      Asthma No family hx of      Osteoperosis No family hx of      Genetic Disorder No family hx of      Thyroid Disease No family hx of      Obesity No family hx of      Unknown/Adopted No family hx of        Prior to Admission medications    Medication Sig Start Date End Date Taking? Authorizing Provider   atorvastatin (LIPITOR) 40 MG tablet TAKE 1 TABLET(40 MG) BY MOUTH DAILY 5/8/18  Yes Johana Tamez MD   lisinopril (PRINIVIL,ZESTRIL) 20 MG tablet  10/24/16  Yes Reported, Patient   losartan (COZAAR) 50 MG tablet Take 1 tablet (50 mg) by mouth daily 5/25/18  Yes Johana Tamez MD   varenicline (CHANTIX STARTING MONTH CLEMENTINA) 0.5 MG X 11 & 1 MG X 42 tablet Take 0.5 mg tab daily for 3 days, then 0.5 mg tab twice daily for 4 days, then 1 mg twice daily. 3/28/17  Yes Johana Tamez MD   CHANTIX CONTINUING MONTH CLEMENTINA 1 MG tablet TAKE 1 TABLET BY MOUTH TWICE DAILY 6/1/18   Johana Tamez MD   multivitamin, therapeutic with minerals (MULTI-VITAMIN) TABS Take 1 tablet by mouth daily    Reported, Patient   order for DME [facility]  5-15 CM/H20  [facility]    Reported, Patient   rosuvastatin (CRESTOR) 10 MG tablet Take 20 mg by mouth daily     Reported, Patient   varenicline (CHANTIX STARTING MONTH CLEMENTINA) 0.5 MG X 11 & 1 MG X 42 tablet Take 0.5 mg tab daily for 3 days, then 0.5 mg tab twice daily  "for 4 days, then 1 mg twice daily. 5/8/18   Johana Tamez MD   VENTOLIN  (90 Base) MCG/ACT Inhaler INHALE 2 PUFFS INTO THE LUNGS EVERY 6 HOURS AS NEEDED FOR SHORTNESS OF BREATH OR DIFFICULT BREATHING OR WHEEZING 6/24/18   Johana Tamez MD       Allergies   Allergen Reactions     Ibuprofen      Other reaction(s): Other - Describe In Comment Field  Uri type of symptoms; \"watery eyes\". Gel caps are  OK  Eyes water,runny nose     Lisinopril Cough        REVIEW OF SYSTEMS:   5 point ROS negative except as noted above in HPI, including Gen., Resp., CV, GI &  system review.    PHYSICAL EXAM:   /87  Resp 16  Ht 1.829 m (6')  Wt 122.5 kg (270 lb)  SpO2 96%  BMI 36.62 kg/m2 Estimated body mass index is 36.62 kg/(m^2) as calculated from the following:    Height as of this encounter: 1.829 m (6').    Weight as of this encounter: 122.5 kg (270 lb).   GENERAL APPEARANCE: alert, and oriented  MENTAL STATUS: alert  AIRWAY EXAM: Mallampatti Class II (visualization of the soft palate, fauces, and uvula)  RESP: lungs clear to auscultation - no rales, rhonchi or wheezes  CV: regular rates and rhythm  DIAGNOSTICS:    Not indicated    IMPRESSION   ASA Class 2 - Mild systemic disease    PLAN:   Plan for Colonoscopy with possible biopsy, possible polypectomy. We discussed the risks, benefits and alternatives and the patient wished to proceed.    The above has been forwarded to the consulting provider.      Signed Electronically by: Titi Navarro  July 31, 2018          "

## 2018-07-31 NOTE — BRIEF OP NOTE
[facility] Brief Operative Note    Pre-operative diagnosis: screening    Post-operative diagnosis polyp     Procedure: Procedure(s):  colonoscopy - Wound Class: II-Clean Contaminated   Surgeon(s): Surgeon(s) and Role:     * Titi Navarro MD - Primary   Estimated blood loss: * No values recorded between 7/31/2018 12:00 AM and 7/31/2018  8:00 AM *    Specimens:   ID Type Source Tests Collected by Time Destination   A : recto-sigmoid polyp  Polyp Large Intestine, Rectum SURGICAL PATHOLOGY EXAM Titi Navarro MD 7/31/2018  7:56 AM       Findings: Please see ProVation procedure note in Chart Review

## 2018-08-01 LAB — COPATH REPORT: NORMAL

## 2018-10-09 ENCOUNTER — OFFICE VISIT (OUTPATIENT)
Dept: FAMILY MEDICINE | Facility: CLINIC | Age: 53
End: 2018-10-09
Payer: COMMERCIAL

## 2018-10-09 VITALS
TEMPERATURE: 96.9 F | HEART RATE: 56 BPM | DIASTOLIC BLOOD PRESSURE: 84 MMHG | SYSTOLIC BLOOD PRESSURE: 131 MMHG | HEIGHT: 71 IN | BODY MASS INDEX: 39.34 KG/M2 | OXYGEN SATURATION: 99 % | WEIGHT: 281 LBS

## 2018-10-09 DIAGNOSIS — E66.01 MORBID OBESITY (H): ICD-10-CM

## 2018-10-09 DIAGNOSIS — Z00.00 ENCOUNTER FOR ROUTINE ADULT HEALTH EXAMINATION WITHOUT ABNORMAL FINDINGS: Primary | ICD-10-CM

## 2018-10-09 DIAGNOSIS — R21 RASH: ICD-10-CM

## 2018-10-09 DIAGNOSIS — I10 BENIGN ESSENTIAL HYPERTENSION: ICD-10-CM

## 2018-10-09 DIAGNOSIS — R73.01 ELEVATED FASTING GLUCOSE: ICD-10-CM

## 2018-10-09 LAB
ALBUMIN SERPL-MCNC: 4.1 G/DL (ref 3.4–5)
ALP SERPL-CCNC: 76 U/L (ref 40–150)
ALT SERPL W P-5'-P-CCNC: 66 U/L (ref 0–70)
ANION GAP SERPL CALCULATED.3IONS-SCNC: 6 MMOL/L (ref 3–14)
AST SERPL W P-5'-P-CCNC: 46 U/L (ref 0–45)
BILIRUB SERPL-MCNC: 0.6 MG/DL (ref 0.2–1.3)
BUN SERPL-MCNC: 17 MG/DL (ref 7–30)
CALCIUM SERPL-MCNC: 9.1 MG/DL (ref 8.5–10.1)
CHLORIDE SERPL-SCNC: 103 MMOL/L (ref 94–109)
CO2 SERPL-SCNC: 27 MMOL/L (ref 20–32)
CREAT SERPL-MCNC: 0.97 MG/DL (ref 0.66–1.25)
GFR SERPL CREATININE-BSD FRML MDRD: 82 ML/MIN/1.7M2
GLUCOSE SERPL-MCNC: 117 MG/DL (ref 70–99)
HBA1C MFR BLD: 6.1 % (ref 0–5.6)
POTASSIUM SERPL-SCNC: 4.6 MMOL/L (ref 3.4–5.3)
PROT SERPL-MCNC: 7.9 G/DL (ref 6.8–8.8)
SODIUM SERPL-SCNC: 136 MMOL/L (ref 133–144)

## 2018-10-09 PROCEDURE — 99213 OFFICE O/P EST LOW 20 MIN: CPT | Mod: 25 | Performed by: FAMILY MEDICINE

## 2018-10-09 PROCEDURE — 80053 COMPREHEN METABOLIC PANEL: CPT | Performed by: FAMILY MEDICINE

## 2018-10-09 PROCEDURE — 99396 PREV VISIT EST AGE 40-64: CPT | Mod: 25 | Performed by: FAMILY MEDICINE

## 2018-10-09 PROCEDURE — 90686 IIV4 VACC NO PRSV 0.5 ML IM: CPT | Performed by: FAMILY MEDICINE

## 2018-10-09 PROCEDURE — 36415 COLL VENOUS BLD VENIPUNCTURE: CPT | Performed by: FAMILY MEDICINE

## 2018-10-09 PROCEDURE — 83036 HEMOGLOBIN GLYCOSYLATED A1C: CPT | Performed by: FAMILY MEDICINE

## 2018-10-09 PROCEDURE — 90471 IMMUNIZATION ADMIN: CPT | Performed by: FAMILY MEDICINE

## 2018-10-09 NOTE — NURSING NOTE
"Chief Complaint   Patient presents with     Physical     mole check and he is fasting today     initial /84 (BP Location: Left arm, Cuff Size: Adult Large)  Pulse 56  Temp 96.9  F (36.1  C) (Tympanic)  Ht 5' 11\" (1.803 m)  Wt 281 lb (127.5 kg)  SpO2 99%  BMI 39.19 kg/m2 Estimated body mass index is 39.19 kg/(m^2) as calculated from the following:    Height as of this encounter: 5' 11\" (1.803 m).    Weight as of this encounter: 281 lb (127.5 kg).  BP completed using cuff size: large.  L  arm      Health Maintenance that is potentially due pending provider review:  NONE    n/a    Clifford Kaufman ma  "

## 2018-10-09 NOTE — PROGRESS NOTES
SUBJECTIVE:   CC: Martin Anguiano is an 53 year old male who presents for preventative health visit.     Physical   Annual:     Getting at least 3 servings of Calcium per day:  Yes    Bi-annual eye exam:  NO    Dental care twice a year:  NO    Sleep apnea or symptoms of sleep apnea:  Sleep apnea    Diet:  Diabetic    Frequency of exercise:  4-5 days/week    Duration of exercise:  15-30 minutes    Taking medications regularly:  Yes    Medication side effects:  None    Additional concerns today:  YES        PROBLEMS TO ADD ON...  1) is here for a f/u on the elevated glucose and high HGb A 1 c was 6.3 in May , he has started to exercise more and eat healthy and is here to recheck , he does have a FH od DM mom and two of his siblings have type 2 DM   2) HTN , he is on lisinopril 20mg and that seems to control his Bp , although he does not check much at home , he denies any symtpoms though  3) elevated cholesterol and he is currently on Lipitor 40 mg , his lipids in May showed LDL of 55 and HDL of 40 and triglycerides of 132 , no side effects with the medication , doing well .  4) has had a spot on the right side of his neck wants me to look at     Today's PHQ-2 Score:   PHQ-2 ( 1999 [employer]) 10/9/2018   Q1: Little interest or pleasure in doing things 0   Q2: Feeling down, depressed or hopeless 0   PHQ-2 Score 0   Q1: Little interest or pleasure in doing things Not at all   Q2: Feeling down, depressed or hopeless Not at all   PHQ-2 Score 0       Abuse: Current or Past(Physical, Sexual or Emotional)- No  Do you feel safe in your environment - Yes    Social History   Substance Use Topics     Smoking status: Former Smoker     Packs/day: 1.00     Years: 20.00     Types: Cigarettes     Quit date: 10/4/2016     Smokeless tobacco: Never Used      Comment: last 10 years were off and on, about 1/2016     Alcohol use 0.0 oz/week     0 Standard drinks or equivalent per week      Comment: 1-2 times per week, 2 drinks     Alcohol Use  10/9/2018   If you drink alcohol do you typically have greater than 3 drinks per day OR greater than 7 drinks per week? No       Last PSA:   PSA   Date Value Ref Range Status   10/21/2016 0.61 0 - 4 ug/L Final       Reviewed orders with patient. Reviewed health maintenance and updated orders accordingly - Yes  Labs reviewed in EPIC  BP Readings from Last 3 Encounters:   10/09/18 131/84   07/31/18 131/79   05/08/18 156/88    Wt Readings from Last 3 Encounters:   10/09/18 281 lb (127.5 kg)   07/31/18 270 lb (122.5 kg)   05/08/18 283 lb (128.4 kg)                  Patient Active Problem List   Diagnosis     Benign essential hypertension     JOJO (obstructive sleep apnea)     Family history of diabetes mellitus     Glucose intolerance (impaired glucose tolerance)     Non morbid obesity due to excess calories     Screening for prostate cancer     Mixed hyperlipidemia     Tobacco use disorder 19-46y/o @ 1.5ppd for 15 yrs then 1ppd=31-32 pk yr hx     Chronic coronary artery disease     Impotence of organic origin     Family history of coronary artery disease     Gastroesophageal reflux disease     History of vasectomy     Hypercholesterolemia     Vitamin D deficiency     Cellulitis and abscess of leg-healing and closing      Obesity (BMI 35.0-39.9) with comorbidity (H)     Past Surgical History:   Procedure Laterality Date     NO HISTORY OF SURGERY         Social History   Substance Use Topics     Smoking status: Former Smoker     Packs/day: 1.00     Years: 20.00     Types: Cigarettes     Quit date: 10/4/2016     Smokeless tobacco: Never Used      Comment: last 10 years were off and on, about 1/2016     Alcohol use 0.0 oz/week     0 Standard drinks or equivalent per week      Comment: 1-2 times per week, 2 drinks     Family History   Problem Relation Age of Onset     Diabetes Mother      Diabetes Brother      Hypertension Father      Hyperlipidemia Father      Coronary Artery Disease No family hx of      Cerebrovascular  "Disease No family hx of      Breast Cancer No family hx of      Colon Cancer No family hx of      Prostate Cancer No family hx of      Other Cancer No family hx of      Depression No family hx of      Anxiety Disorder No family hx of      Mental Illness No family hx of      Substance Abuse No family hx of      Anesthesia Reaction No family hx of      Asthma No family hx of      Osteoporosis No family hx of      Genetic Disorder No family hx of      Thyroid Disease No family hx of      Obesity No family hx of      Unknown/Adopted No family hx of          Current Outpatient Prescriptions   Medication Sig Dispense Refill     atorvastatin (LIPITOR) 40 MG tablet TAKE 1 TABLET(40 MG) BY MOUTH DAILY 90 tablet 1     lisinopril (PRINIVIL,ZESTRIL) 20 MG tablet   5     multivitamin, therapeutic with minerals (MULTI-VITAMIN) TABS Take 1 tablet by mouth daily       VENTOLIN  (90 Base) MCG/ACT Inhaler INHALE 2 PUFFS INTO THE LUNGS EVERY 6 HOURS AS NEEDED FOR SHORTNESS OF BREATH OR DIFFICULT BREATHING OR WHEEZING 18 g 5     Allergies   Allergen Reactions     Ibuprofen      Other reaction(s): Other - Describe In Comment Field  Uri type of symptoms; \"watery eyes\". Gel caps are  OK  Eyes water,runny nose     Lisinopril Cough     Recent Labs   Lab Test  10/09/18   1043  05/08/18   1132  03/22/17   1145  10/21/16   1201   A1C  6.1*  6.3*  6.1*  6.3*   LDL   --   55  55  53   HDL   --   40  49  37*   TRIG   --   132  119  89   ALT  66  68   --   63   CR  0.97  0.98   --   1.13   GFRESTIMATED  82  80   --   69   GFRESTBLACK  >90  >90   --   83   POTASSIUM  4.6  4.4   --   4.4        Reviewed and updated as needed this visit by clinical staff  Tobacco  Allergies  Meds         Reviewed and updated as needed this visit by Provider        Past Medical History:   Diagnosis Date     HTN (hypertension)      Hypercholesteremia       Past Surgical History:   Procedure Laterality Date     NO HISTORY OF SURGERY         Review of " "Systems  CONSTITUTIONAL: NEGATIVE for fever, chills, change in weight  INTEGUMENTARY/SKIN: NEGATIVE for worrisome rashes, moles or lesions  EYES: NEGATIVE for vision changes or irritation  ENT: NEGATIVE for ear, mouth and throat problems  RESP: NEGATIVE for significant cough or SOB  CV: NEGATIVE for chest pain, palpitations or peripheral edema  GI: NEGATIVE for nausea, abdominal pain, heartburn, or change in bowel habits   male: negative for dysuria, hematuria, decreased urinary stream, erectile dysfunction, urethral discharge  MUSCULOSKELETAL: NEGATIVE for significant arthralgias or myalgia  NEURO: NEGATIVE for weakness, dizziness or paresthesias  PSYCHIATRIC: NEGATIVE for changes in mood or affect    OBJECTIVE:   Temp 96.9  F (36.1  C) (Tympanic)  Ht 5' 11\" (1.803 m)  Wt 281 lb (127.5 kg)  BMI 39.19 kg/m2    Physical Exam  GENERAL: healthy, alert and no distress  EYES: Eyes grossly normal to inspection, PERRL and conjunctivae and sclerae normal  HENT: ear canals and TM's normal, nose and mouth without ulcers or lesions  NECK: no adenopathy, no asymmetry, masses, or scars and thyroid normal to palpation  RESP: lungs clear to auscultation - no rales, rhonchi or wheezes  CV: regular rate and rhythm, normal S1 S2, no S3 or S4, no murmur, click or rub, no peripheral edema and peripheral pulses strong  ABDOMEN: soft, nontender, no hepatosplenomegaly, no masses and bowel sounds normal  MS: no gross musculoskeletal defects noted, no edema  SKIN: no suspicious lesions or rashes EXCEPT several seborrheci keratosis spots on the neck  , one is with erythematous borders and also difference in colors   NEURO: Normal strength and tone, mentation intact and speech normal  PSYCH: mentation appears normal, affect normal/bright    Diagnostic Test Results:  Results for orders placed or performed in visit on 10/09/18   Hemoglobin A1c   Result Value Ref Range    Hemoglobin A1C 6.1 (H) 0 - 5.6 %   Comprehensive metabolic panel (BMP " + Alb, Alk Phos, ALT, AST, Total. Bili, TP)   Result Value Ref Range    Sodium 136 133 - 144 mmol/L    Potassium 4.6 3.4 - 5.3 mmol/L    Chloride 103 94 - 109 mmol/L    Carbon Dioxide 27 20 - 32 mmol/L    Anion Gap 6 3 - 14 mmol/L    Glucose 117 (H) 70 - 99 mg/dL    Urea Nitrogen 17 7 - 30 mg/dL    Creatinine 0.97 0.66 - 1.25 mg/dL    GFR Estimate 82 >60 mL/min/1.7m2    GFR Estimate If Black >90 >60 mL/min/1.7m2    Calcium 9.1 8.5 - 10.1 mg/dL    Bilirubin Total 0.6 0.2 - 1.3 mg/dL    Albumin 4.1 3.4 - 5.0 g/dL    Protein Total 7.9 6.8 - 8.8 g/dL    Alkaline Phosphatase 76 40 - 150 U/L    ALT 66 0 - 70 U/L    AST 46 (H) 0 - 45 U/L       ASSESSMENT/PLAN:   1. Encounter for routine adult health examination without abnormal findings  Is healthy . Got his flu shot today .  - FLU VAC PRESRV FREE QUAD SPLIT VIR, IM (3+ YRS)  - ADMIN 1st VACCINE    2. Benign essential hypertension  Will check CMP , he will continue to take his lisinopril and monitor BP , recheck here in three months   - Comprehensive metabolic panel (BMP + Alb, Alk Phos, ALT, AST, Total. Bili, TP)    3. Elevated fasting glucose  We discussed starting metformin but he wants to continue with the diet and lifestyle changes m so far it went down from 6.3 to 6.1 for a couple of months , will need to recheck in 3 to 4 months again . Pt is aware  and comfortable with the current plan.     - Hemoglobin A1c    4. Morbid obesity (H)  We discussed increased exercise and limit the amount of processed foods and sugary foods, also limit alcohol to one drink a day at the most     5. Rash  Two new spots on the right side of his neck - referred to derm for this   - DERMATOLOGY REFERRAL    COUNSELING:   Reviewed preventive health counseling, as reflected in patient instructions       Regular exercise       Healthy diet/nutrition       Vision screening       Hearing screening    BP Readings from Last 1 Encounters:   07/31/18 131/79     Estimated body mass index is 39.19  "kg/(m^2) as calculated from the following:    Height as of this encounter: 5' 11\" (1.803 m).    Weight as of this encounter: 281 lb (127.5 kg).           reports that he quit smoking about 2 years ago. His smoking use included Cigarettes. He has a 20.00 pack-year smoking history. He has never used smokeless tobacco.      Counseling Resources:  ATP IV Guidelines  Pooled Cohorts Equation Calculator  FRAX Risk Assessment  ICSI Preventive Guidelines  Dietary Guidelines for Americans, 2010  USDA's MyPlate  ASA Prophylaxis  Lung CA Screening    Johana Tamez MD  [facility]  Answers for HPI/ROS submitted by the patient on 10/9/2018   PHQ-2 Score: 0    "

## 2018-10-09 NOTE — MR AVS SNAPSHOT
After Visit Summary   10/9/2018    Martin Anguiano    MRN: [id number]           Patient Information     Date Of Birth          1965        Visit Information        Provider Department      10/9/2018 10:20 AM Johana Tamez MD [facility]        Today's Diagnoses     Encounter for routine adult health examination without abnormal findings    -  1    Benign essential hypertension        Elevated fasting glucose        Morbid obesity (H)        Rash          Care Instructions      Preventive Health Recommendations  Male Ages 50 - 64    Yearly exam:             See your health care provider every year in order to  o   Review health changes.   o   Discuss preventive care.    o   Review your medicines if your doctor has prescribed any.     Have a cholesterol test every 5 years, or more frequently if you are at risk for high cholesterol/heart disease.     Have a diabetes test (fasting glucose) every three years. If you are at risk for diabetes, you should have this test more often.     Have a colonoscopy at age 50, or have a yearly FIT test (stool test). These exams will check for colon cancer.      Talk with your health care provider about whether or not a prostate cancer screening test (PSA) is right for you.    You should be tested each year for STDs (sexually transmitted diseases), if you re at risk.     Shots: Get a flu shot each year. Get a tetanus shot every 10 years.     Nutrition:    Eat at least 5 servings of fruits and vegetables daily.     Eat whole-grain bread, whole-wheat pasta and brown rice instead of white grains and rice.     Get adequate Calcium and Vitamin D.     Lifestyle    Exercise for at least 150 minutes a week (30 minutes a day, 5 days a week). This will help you control your weight and prevent disease.     Limit alcohol to one drink per day.     No smoking.     Wear sunscreen to prevent skin cancer.     See your dentist every six months for an exam and cleaning.     See  your eye doctor every 1 to 2 years.            Follow-ups after your visit        Additional Services     DERMATOLOGY REFERRAL       Your provider has referred you to: [facility]: [facility] [phone]  [email or web address]    Please be aware that coverage of these services is subject to the terms and limitations of your health insurance plan.  Call member services at your health plan with any benefit or coverage questions.      Please bring the following with you to your appointment:    (1) Any X-Rays, CTs or MRIs which have been performed.  Contact the facility where they were done to arrange for  prior to your scheduled appointment.    (2) List of current medications  (3) This referral request   (4) Any documents/labs given to you for this referral                  Follow-up notes from your care team     Return in about 3 months (around 1/9/2019).      Who to contact     If you have questions or need follow up information about today's clinic visit or your schedule please contact [facility] directly at [phone].  Normal or non-critical lab and imaging results will be communicated to you by MyChart, letter or phone within 4 business days after the clinic has received the results. If you do not hear from us within 7 days, please contact the clinic through MyChart or phone. If you have a critical or abnormal lab result, we will notify you by phone as soon as possible.  Submit refill requests through [employer] or call your pharmacy and they will forward the refill request to us. Please allow 3 business days for your refill to be completed.          Additional Information About Your Visit        [email or web address] Information     [employer] gives you secure access to your electronic health record. If you see a primary care provider, you can also send messages to your care team and make appointments. If you have questions, please call your primary care clinic.  If you do not have a  "primary care provider, please call [phone] and they will assist you.        Care EveryWhere ID     This is your Care EveryWhere ID. This could be used by other organizations to access your [place] medical records  [id number]        Your Vitals Were     Pulse Temperature Height Pulse Oximetry BMI (Body Mass Index)       56 96.9  F (36.1  C) (Tympanic) 5' 11\" (1.803 m) 99% 39.19 kg/m2        Blood Pressure from Last 3 Encounters:   10/09/18 131/84   07/31/18 131/79   05/08/18 156/88    Weight from Last 3 Encounters:   10/09/18 281 lb (127.5 kg)   07/31/18 270 lb (122.5 kg)   05/08/18 283 lb (128.4 kg)              We Performed the Following     Comprehensive metabolic panel (BMP + Alb, Alk Phos, ALT, AST, Total. Bili, TP)     DERMATOLOGY REFERRAL     FLU VAC PRESRV FREE QUAD SPLIT VIR, IM (3+ YRS)     Hemoglobin A1c        Primary Care Provider Office Phone # Fax #    Johana Tamez [phone] [address]   [place] [address]        Equal Access to Services     CAROLYNE SIN AH: Hadii solo loweo Sodotty, waaxda luqadaha, qaybta kaalmada adeegyada, kimi bazan. So [facility] [phone].    ATENCIÓN: Si habla español, tiene a ambrosio disposición servicios gratuitos de asistencia lingüística. Llame al [phone].    We comply with applicable federal civil rights laws and [place] laws. We do not discriminate on the basis of race, color, national origin, age, disability, sex, sexual orientation, or gender identity.            Thank you!     Thank you for choosing [facility]  for your care. Our goal is always to provide you with excellent care. Hearing back from our patients is one way we can continue to improve our services. Please take a few minutes to complete the written survey that you may receive in the mail after your visit with us. Thank you!             Your Updated Medication List - Protect others around you: Learn how to safely " use, store and throw away your medicines at www.disposemymeds.org.          This list is accurate as of 10/9/18 10:38 AM.  Always use your most recent med list.                   Brand Name Dispense Instructions for use Diagnosis    atorvastatin 40 MG tablet    LIPITOR    90 tablet    TAKE 1 TABLET(40 MG) BY MOUTH DAILY    Encounter for routine adult health examination without abnormal findings, Hypercholesterolemia       lisinopril 20 MG tablet    PRINIVIL/ZESTRIL          Multi-vitamin Tabs tablet      Take 1 tablet by mouth daily        VENTOLIN  (90 Base) MCG/ACT inhaler   Generic drug:  albuterol     18 g    INHALE 2 PUFFS INTO THE LUNGS EVERY 6 HOURS AS NEEDED FOR SHORTNESS OF BREATH OR DIFFICULT BREATHING OR WHEEZING    Cough

## 2018-10-12 DIAGNOSIS — I10 BENIGN ESSENTIAL HYPERTENSION: ICD-10-CM

## 2018-10-12 RX ORDER — LOSARTAN POTASSIUM 50 MG/1
TABLET ORAL
Start: 2018-10-12

## 2018-10-12 NOTE — TELEPHONE ENCOUNTER
"Denied  D/C'd 10/9/2018 - only on Lisinopril for HTN right now  Erika DEAN RN    Requested Prescriptions   Pending Prescriptions Disp Refills     losartan (COZAAR) 50 MG tablet [Pharmacy Med Name: LOSARTAN 50MG TABLETS] 30 tablet 0     Sig: TAKE 1 TABLET(50 MG) BY MOUTH DAILY    Angiotensin-II Receptors Passed    10/12/2018  3:27 AM       Passed - Blood pressure under 140/90 in past 12 months    BP Readings from Last 3 Encounters:   10/09/18 131/84   07/31/18 131/79   05/08/18 156/88                Passed - Recent (12 mo) or future (30 days) visit within the authorizing provider's specialty    Patient had office visit in the last 12 months or has a visit in the next 30 days with authorizing provider or within the authorizing provider's specialty.  See \"Patient Info\" tab in inbasket, or \"Choose Columns\" in Meds & Orders section of the refill encounter.           Passed - Patient is age 18 or older       Passed - Normal serum creatinine on file in past 12 months    Recent Labs   Lab Test  10/09/18   1043   CR  0.97            Passed - Normal serum potassium on file in past 12 months    Recent Labs   Lab Test  10/09/18   1043   POTASSIUM  4.6                        "

## 2018-10-18 ENCOUNTER — TELEPHONE (OUTPATIENT)
Dept: FAMILY MEDICINE | Facility: CLINIC | Age: 53
End: 2018-10-18

## 2018-10-18 DIAGNOSIS — I10 BENIGN ESSENTIAL HYPERTENSION: ICD-10-CM

## 2018-10-18 NOTE — TELEPHONE ENCOUNTER
"LS  Called patient.  He did not see the message. Doesn't use Mycharts he says.  Verbalizes understanding of message.    Also states he needs Rx for Losartan.  \"The Lisinopril caused a cough so I was changed to Losartan. Think I said the wrong name of medication I was on\"    Order T'd up.  Thanks, Christina Guo RN              "

## 2018-10-18 NOTE — TELEPHONE ENCOUNTER
I have sent him a note about results through [employer]- has he received it ?  If not can you let him know that Hgb A 1 c is better now and I want him to recheck in three months?  Thanks

## 2018-10-18 NOTE — TELEPHONE ENCOUNTER
Reason for Call:  Request for results:    Name of test or procedure: Lab results    Date of test of procedure: 10/09    Location of the test or procedure: [facility] lab    OK to leave the result message on voice mail or with a family member? NO    Phone number Patient can be reached at:  Cell number on file:    Telephone Information:   Mobile [phone]       Additional comments: Patient is most concerned about the blood sugar test    Call taken on 10/18/2018 at 11:05 AM by Lis Caldera

## 2018-10-21 RX ORDER — LOSARTAN POTASSIUM 50 MG/1
50 TABLET ORAL DAILY
Qty: 30 TABLET | Refills: 2 | Status: SHIPPED | OUTPATIENT
Start: 2018-10-21 | End: 2019-05-24

## 2018-11-14 DIAGNOSIS — E78.00 HYPERCHOLESTEROLEMIA: ICD-10-CM

## 2018-11-14 DIAGNOSIS — Z00.00 ENCOUNTER FOR ROUTINE ADULT HEALTH EXAMINATION WITHOUT ABNORMAL FINDINGS: ICD-10-CM

## 2018-11-15 RX ORDER — ATORVASTATIN CALCIUM 40 MG/1
TABLET, FILM COATED ORAL
Qty: 90 TABLET | Refills: 1 | Status: SHIPPED | OUTPATIENT
Start: 2018-11-15 | End: 2019-06-03

## 2018-11-15 NOTE — TELEPHONE ENCOUNTER
"Prescription approved per [facility] Refill Protocol.  Christina Guo RN    Requested Prescriptions   Signed Prescriptions Disp Refills     atorvastatin (LIPITOR) 40 MG tablet 90 tablet 1     Sig: TAKE 1 TABLET(40 MG) BY MOUTH DAILY    Statins Protocol Passed    11/14/2018  3:27 AM       Passed - LDL on file in past 12 months    Recent Labs   Lab Test  05/08/18   1132   LDL  55            Passed - No abnormal creatine kinase in past 12 months    No lab results found.            Passed - Recent (12 mo) or future (30 days) visit within the authorizing provider's specialty    Patient had office visit in the last 12 months or has a visit in the next 30 days with authorizing provider or within the authorizing provider's specialty.  See \"Patient Info\" tab in inbasket, or \"Choose Columns\" in Meds & Orders section of the refill encounter.             Passed - Patient is age 18 or older          "

## 2018-12-07 ENCOUNTER — MYC MEDICAL ADVICE (OUTPATIENT)
Dept: FAMILY MEDICINE | Facility: CLINIC | Age: 53
End: 2018-12-07

## 2018-12-07 DIAGNOSIS — F17.200 TOBACCO USE DISORDER: Primary | ICD-10-CM

## 2018-12-07 DIAGNOSIS — F17.200 TOBACCO USE DISORDER: ICD-10-CM

## 2018-12-07 NOTE — TELEPHONE ENCOUNTER
"Not on current medication list.  Sent patient [employer] message.  Did he request refill?  Christina Guo RN    Requested Prescriptions   Pending Prescriptions Disp Refills     CHANTIX CONTINUING MONTH CLEMENTINA 1 MG tablet [Pharmacy Med Name: CHANTIX 1MG CONT SINGLE  PACK 56] 56 tablet 0     Sig: TAKE 1 TABLET BY MOUTH TWICE DAILY    Partial Cholinergic Nicotinic Agonist Agents Passed    12/7/2018 12:09 PM       Passed - Blood pressure under 140/90 in past 12 months    BP Readings from Last 3 Encounters:   10/09/18 131/84   07/31/18 131/79   05/08/18 156/88                Passed - Recent (12 mo) or future (30 days) visit within the authorizing provider's specialty    Patient had office visit in the last 12 months or has a visit in the next 30 days with authorizing provider or within the authorizing provider's specialty.  See \"Patient Info\" tab in inbasket, or \"Choose Columns\" in Meds & Orders section of the refill encounter.             Passed - Patient is 18 years of age or older          "

## 2018-12-10 RX ORDER — VARENICLINE TARTRATE 1 MG/1
TABLET, FILM COATED ORAL
Qty: 56 TABLET | Refills: 0 | Status: ON HOLD | OUTPATIENT
Start: 2018-12-10 | End: 2019-02-28

## 2018-12-10 RX ORDER — VARENICLINE TARTRATE 1 MG/1
1 TABLET, FILM COATED ORAL 2 TIMES DAILY
Qty: 56 TABLET | Refills: 2 | Status: CANCELLED | OUTPATIENT
Start: 2018-12-10 | End: 2019-12-10

## 2018-12-10 NOTE — TELEPHONE ENCOUNTER
LS  Routing refill request to provider for review/approval because:  Drug not active on patient's medication list  Starter pack on history list from last OV 10/9/18 (physical)  Needs continuation pack.  Thanks, Christina Guo RN    Requested Prescriptions   Pending Prescriptions Disp Refills     varenicline (CHANTIX) 1 MG tablet 56 tablet 2     Sig: Take 1 tablet (1 mg) by mouth 2 times daily    There is no refill protocol information for this order

## 2018-12-10 NOTE — TELEPHONE ENCOUNTER
LS  Routing refill request to provider for review/approval because:  Drug not active on patient's medication list  Starter pack on history list from last OV 10/9/18 (physical)  Needs continuation pack.  Thanks, Christina Guo RN            Requested Prescriptions   Pending Prescriptions Disp Refills     varenicline (CHANTIX) 1 MG tablet 56 tablet 2       Sig: Take 1 tablet (1 mg) by mouth 2 times daily     There is no refill protocol information for this order             Patient sent Mychart date 12/7/18 that he had starter pack and is finished with it

## 2019-01-02 DIAGNOSIS — G47.33 OBSTRUCTIVE SLEEP APNEA (ADULT) (PEDIATRIC): Primary | ICD-10-CM

## 2019-02-27 ENCOUNTER — TRANSFERRED RECORDS (OUTPATIENT)
Dept: HEALTH INFORMATION MANAGEMENT | Facility: CLINIC | Age: 54
End: 2019-02-27

## 2019-02-27 ENCOUNTER — HOSPITAL ENCOUNTER (OUTPATIENT)
Facility: CLINIC | Age: 54
Setting detail: OBSERVATION
Discharge: HOME | End: 2019-02-28
Attending: INTERNAL MEDICINE | Admitting: INTERNAL MEDICINE
Payer: COMMERCIAL

## 2019-02-27 DIAGNOSIS — R05.9 COUGH: ICD-10-CM

## 2019-02-27 DIAGNOSIS — J44.1 COPD WITH ACUTE EXACERBATION (H): Primary | ICD-10-CM

## 2019-02-27 PROBLEM — J44.9 COPD (CHRONIC OBSTRUCTIVE PULMONARY DISEASE) (H): Status: ACTIVE | Noted: 2019-02-27

## 2019-02-27 LAB
CREAT SERPL-MCNC: 1.03 MG/DL (ref 0.72–1.25)
GFR SERPL CREATININE-BSD FRML MDRD: >60 ML/MIN/1.73M2
GLUCOSE SERPL-MCNC: 113 MG/DL (ref 65–100)
POTASSIUM SERPL-SCNC: 4 MMOL/L (ref 3.5–5)

## 2019-02-27 PROCEDURE — 25000128 H RX IP 250 OP 636: Performed by: PHYSICIAN ASSISTANT

## 2019-02-27 PROCEDURE — 12000000 ZZH R&B MED SURG/OB

## 2019-02-27 PROCEDURE — 25000132 ZZH RX MED GY IP 250 OP 250 PS 637: Performed by: PHYSICIAN ASSISTANT

## 2019-02-27 PROCEDURE — 99223 1ST HOSP IP/OBS HIGH 75: CPT | Mod: AI | Performed by: PHYSICIAN ASSISTANT

## 2019-02-27 RX ORDER — ALBUTEROL SULFATE 90 UG/1
AEROSOL, METERED RESPIRATORY (INHALATION)
Qty: 18 G | Refills: 0 | Status: SHIPPED | OUTPATIENT
Start: 2019-02-27 | End: 2019-05-24

## 2019-02-27 RX ORDER — ACETAMINOPHEN 325 MG/1
650 TABLET ORAL EVERY 4 HOURS PRN
Status: DISCONTINUED | OUTPATIENT
Start: 2019-02-27 | End: 2019-02-28 | Stop reason: HOSPADM

## 2019-02-27 RX ORDER — NALOXONE HYDROCHLORIDE 0.4 MG/ML
.1-.4 INJECTION, SOLUTION INTRAMUSCULAR; INTRAVENOUS; SUBCUTANEOUS
Status: DISCONTINUED | OUTPATIENT
Start: 2019-02-27 | End: 2019-02-28 | Stop reason: HOSPADM

## 2019-02-27 RX ORDER — PROCHLORPERAZINE MALEATE 10 MG
10 TABLET ORAL EVERY 6 HOURS PRN
Status: DISCONTINUED | OUTPATIENT
Start: 2019-02-27 | End: 2019-02-28 | Stop reason: HOSPADM

## 2019-02-27 RX ORDER — PROCHLORPERAZINE 25 MG
25 SUPPOSITORY, RECTAL RECTAL EVERY 12 HOURS PRN
Status: DISCONTINUED | OUTPATIENT
Start: 2019-02-27 | End: 2019-02-28 | Stop reason: HOSPADM

## 2019-02-27 RX ORDER — ONDANSETRON 2 MG/ML
4 INJECTION INTRAMUSCULAR; INTRAVENOUS EVERY 6 HOURS PRN
Status: DISCONTINUED | OUTPATIENT
Start: 2019-02-27 | End: 2019-02-28 | Stop reason: HOSPADM

## 2019-02-27 RX ORDER — ALBUTEROL SULFATE 0.83 MG/ML
3 SOLUTION RESPIRATORY (INHALATION) EVERY 4 HOURS
Status: DISCONTINUED | OUTPATIENT
Start: 2019-02-27 | End: 2019-02-28

## 2019-02-27 RX ORDER — AZITHROMYCIN 250 MG/1
250 TABLET, FILM COATED ORAL DAILY
Status: DISCONTINUED | OUTPATIENT
Start: 2019-02-28 | End: 2019-02-28 | Stop reason: HOSPADM

## 2019-02-27 RX ORDER — METHYLPREDNISOLONE SODIUM SUCCINATE 125 MG/2ML
60 INJECTION, POWDER, LYOPHILIZED, FOR SOLUTION INTRAMUSCULAR; INTRAVENOUS EVERY 12 HOURS
Status: DISCONTINUED | OUTPATIENT
Start: 2019-02-27 | End: 2019-02-28 | Stop reason: HOSPADM

## 2019-02-27 RX ORDER — AZITHROMYCIN 250 MG/1
500 TABLET, FILM COATED ORAL ONCE
Status: COMPLETED | OUTPATIENT
Start: 2019-02-27 | End: 2019-02-27

## 2019-02-27 RX ORDER — ACETAMINOPHEN 650 MG/1
650 SUPPOSITORY RECTAL EVERY 4 HOURS PRN
Status: DISCONTINUED | OUTPATIENT
Start: 2019-02-27 | End: 2019-02-28 | Stop reason: HOSPADM

## 2019-02-27 RX ORDER — ONDANSETRON 4 MG/1
4 TABLET, ORALLY DISINTEGRATING ORAL EVERY 6 HOURS PRN
Status: DISCONTINUED | OUTPATIENT
Start: 2019-02-27 | End: 2019-02-28 | Stop reason: HOSPADM

## 2019-02-27 RX ORDER — IPRATROPIUM BROMIDE AND ALBUTEROL SULFATE 2.5; .5 MG/3ML; MG/3ML
3 SOLUTION RESPIRATORY (INHALATION)
Status: DISCONTINUED | OUTPATIENT
Start: 2019-02-27 | End: 2019-02-28 | Stop reason: HOSPADM

## 2019-02-27 RX ADMIN — METHYLPREDNISOLONE SODIUM SUCCINATE 62.5 MG: 125 INJECTION, POWDER, FOR SOLUTION INTRAMUSCULAR; INTRAVENOUS at 23:38

## 2019-02-27 RX ADMIN — AZITHROMYCIN MONOHYDRATE 500 MG: 250 TABLET ORAL at 23:38

## 2019-02-27 NOTE — LETTER
Transition Communication Hand-off for Care Transitions to Next Level of Care Provider    Name: Martin Anguiano  : 1965  MRN #: [id number]  Primary Care Provider: Johana Tamez  Primary Care MD Name: Dr. Tamez  Primary Clinic: [address]   [facility] [address]  Primary Care Clinic Name:  [facility]  Reason for Hospitalization:  Shortness of breath, COPD  COPD (chronic obstructive pulmonary disease) (H)  COPD with acute exacerbation (H)  Admit Date/Time: 2019  9:57 PM  Discharge Date: 2019    Payor Source: Payor: UCARE / Plan: [employer] INDIVIDUAL FAMILY PLANS WITH [employer] / Product Type: HMO /     Readmission Assessment Measure (ILDA) Risk Score/category: average             Reason for Communication Hand-off Referral: Admission diagnoses: COPD    Discharge Plan:       Concern for non-adherence with plan of care:   Y/N N  Discharge Needs Assessment:  Needs      Most Recent Value   # of Referrals Placed by [facility]  Internal Clinic Care Coordination, Scheduled Follow-up appointments, Communication hand-offs to next level of Care Providers          Already enrolled in Tele-monitoring program and name of program:  N/A  Follow-up specialty is recommended: No    Follow-up plan:    Future Appointments   Date Time Provider Department Center   3/5/2019 10:00 AM Johana Tamez MD [facility]       Any outstanding tests or procedures:              Key Recommendations:    Suspected COPD exacerbation: No formal dx of COPD but history smoking, quit 6 months ago. Presented to OSH with 3 days of cough and SOB. CXR clear. Continued to have significant wheezing and work of breathing despite steroids and nebs  Pt received IV steroids and was discharged on prednisone, nebs and neb machine.  Pt had concerns regarding his large deductible with his insurance plan.  Pt was able to talk with our Financial Counselor.    Thank you for following up with this patient,   Sachi Givens    AVS/Discharge Summary is the source of  truth; this is a helpful guide for improved communication of patient story

## 2019-02-27 NOTE — TELEPHONE ENCOUNTER
"LS  Routing refill request to provider for review/approval because:  Drug not on the [facility] refill protocol for Dx: Cough.  Last OV 10/18/18 - physical  Thanks,   Christina Guo RN    Requested Prescriptions   Pending Prescriptions Disp Refills     albuterol (PROAIR HFA/PROVENTIL HFA/VENTOLIN HFA) 108 (90 Base) MCG/ACT inhaler [Pharmacy Med Name: ALBUTEROL HFA INH (200 PUFFS) 18GM] 18 g 0     Sig: INHALE 2 PUFFS INTO THE LUNGS EVERY 6 HOURS AS NEEDED FOR SHORTNESS OF BREATH OR DIFFICULT BREATHING OR WHEEZING    Asthma Maintenance Inhalers - Anticholinergics Passed - 2/27/2019 11:28 AM       Passed - Patient is age 12 years or older       Passed - Recent (12 mo) or future (30 days) visit within the authorizing provider's specialty    Patient had office visit in the last 12 months or has a visit in the next 30 days with authorizing provider or within the authorizing provider's specialty.  See \"Patient Info\" tab in inbasket, or \"Choose Columns\" in Meds & Orders section of the refill encounter.             Passed - Medication is active on med list                "

## 2019-02-28 VITALS
DIASTOLIC BLOOD PRESSURE: 74 MMHG | HEART RATE: 85 BPM | SYSTOLIC BLOOD PRESSURE: 160 MMHG | TEMPERATURE: 96.7 F | RESPIRATION RATE: 18 BRPM | OXYGEN SATURATION: 93 %

## 2019-02-28 PROBLEM — J44.1 COPD WITH ACUTE EXACERBATION (H): Status: ACTIVE | Noted: 2019-02-28

## 2019-02-28 LAB
ANION GAP SERPL CALCULATED.3IONS-SCNC: 7 MMOL/L (ref 3–14)
BUN SERPL-MCNC: 15 MG/DL (ref 7–30)
CALCIUM SERPL-MCNC: 8.9 MG/DL (ref 8.5–10.1)
CHLORIDE SERPL-SCNC: 107 MMOL/L (ref 94–109)
CO2 SERPL-SCNC: 23 MMOL/L (ref 20–32)
CREAT SERPL-MCNC: 0.91 MG/DL (ref 0.66–1.25)
ERYTHROCYTE [DISTWIDTH] IN BLOOD BY AUTOMATED COUNT: 13.3 % (ref 10–15)
GFR SERPL CREATININE-BSD FRML MDRD: >90 ML/MIN/{1.73_M2}
GLUCOSE SERPL-MCNC: 174 MG/DL (ref 70–99)
HCT VFR BLD AUTO: 43.9 % (ref 40–53)
HGB BLD-MCNC: 15.5 G/DL (ref 13.3–17.7)
MCH RBC QN AUTO: 32 PG (ref 26.5–33)
MCHC RBC AUTO-ENTMCNC: 35.3 G/DL (ref 31.5–36.5)
MCV RBC AUTO: 91 FL (ref 78–100)
PLATELET # BLD AUTO: 153 10E9/L (ref 150–450)
POTASSIUM SERPL-SCNC: 4.3 MMOL/L (ref 3.4–5.3)
RBC # BLD AUTO: 4.84 10E12/L (ref 4.4–5.9)
SODIUM SERPL-SCNC: 137 MMOL/L (ref 133–144)
WBC # BLD AUTO: 9.8 10E9/L (ref 4–11)

## 2019-02-28 PROCEDURE — 94660 CPAP INITIATION&MGMT: CPT

## 2019-02-28 PROCEDURE — 25000132 ZZH RX MED GY IP 250 OP 250 PS 637: Performed by: INTERNAL MEDICINE

## 2019-02-28 PROCEDURE — 40000275 ZZH STATISTIC RCP TIME EA 10 MIN

## 2019-02-28 PROCEDURE — 40000275 ZZH STATISTIC RCP TIME EA 10 MIN: Mod: 76

## 2019-02-28 PROCEDURE — 85027 COMPLETE CBC AUTOMATED: CPT | Performed by: PHYSICIAN ASSISTANT

## 2019-02-28 PROCEDURE — 94640 AIRWAY INHALATION TREATMENT: CPT | Mod: 76

## 2019-02-28 PROCEDURE — 40000809 ZZH STATISTIC NO DOCUMENTATION TO SUPPORT CHARGE

## 2019-02-28 PROCEDURE — 94640 AIRWAY INHALATION TREATMENT: CPT

## 2019-02-28 PROCEDURE — 80048 BASIC METABOLIC PNL TOTAL CA: CPT | Performed by: PHYSICIAN ASSISTANT

## 2019-02-28 PROCEDURE — 99217 ZZC OBSERVATION CARE DISCHARGE: CPT | Performed by: INTERNAL MEDICINE

## 2019-02-28 PROCEDURE — 25000128 H RX IP 250 OP 636: Performed by: PHYSICIAN ASSISTANT

## 2019-02-28 PROCEDURE — 25000132 ZZH RX MED GY IP 250 OP 250 PS 637: Performed by: PHYSICIAN ASSISTANT

## 2019-02-28 PROCEDURE — 36415 COLL VENOUS BLD VENIPUNCTURE: CPT | Performed by: PHYSICIAN ASSISTANT

## 2019-02-28 PROCEDURE — 25000125 ZZHC RX 250: Performed by: PHYSICIAN ASSISTANT

## 2019-02-28 PROCEDURE — G0378 HOSPITAL OBSERVATION PER HR: HCPCS

## 2019-02-28 RX ORDER — IPRATROPIUM BROMIDE AND ALBUTEROL SULFATE 2.5; .5 MG/3ML; MG/3ML
1 SOLUTION RESPIRATORY (INHALATION) EVERY 4 HOURS PRN
Qty: 360 ML | Refills: 0 | Status: SHIPPED | OUTPATIENT
Start: 2019-02-28 | End: 2019-07-09

## 2019-02-28 RX ORDER — HYDRALAZINE HYDROCHLORIDE 20 MG/ML
10 INJECTION INTRAMUSCULAR; INTRAVENOUS EVERY 4 HOURS PRN
Status: DISCONTINUED | OUTPATIENT
Start: 2019-02-28 | End: 2019-02-28 | Stop reason: HOSPADM

## 2019-02-28 RX ORDER — PREDNISONE 20 MG/1
40 TABLET ORAL DAILY
Qty: 10 TABLET | Refills: 0 | Status: SHIPPED | OUTPATIENT
Start: 2019-02-28 | End: 2019-03-05

## 2019-02-28 RX ORDER — LOSARTAN POTASSIUM 50 MG/1
50 TABLET ORAL DAILY
Status: DISCONTINUED | OUTPATIENT
Start: 2019-02-28 | End: 2019-02-28 | Stop reason: HOSPADM

## 2019-02-28 RX ORDER — AZITHROMYCIN 250 MG/1
250 TABLET, FILM COATED ORAL DAILY
Qty: 3 TABLET | Refills: 0 | Status: SHIPPED | OUTPATIENT
Start: 2019-03-01 | End: 2019-11-26

## 2019-02-28 RX ADMIN — ALBUTEROL SULFATE 2.5 MG: 2.5 SOLUTION RESPIRATORY (INHALATION) at 04:08

## 2019-02-28 RX ADMIN — IPRATROPIUM BROMIDE AND ALBUTEROL SULFATE 3 ML: 2.5; .5 SOLUTION RESPIRATORY (INHALATION) at 15:41

## 2019-02-28 RX ADMIN — AZITHROMYCIN 250 MG: 250 TABLET, FILM COATED ORAL at 09:02

## 2019-02-28 RX ADMIN — IPRATROPIUM BROMIDE AND ALBUTEROL SULFATE 3 ML: 2.5; .5 SOLUTION RESPIRATORY (INHALATION) at 11:39

## 2019-02-28 RX ADMIN — IPRATROPIUM BROMIDE AND ALBUTEROL SULFATE 3 ML: 2.5; .5 SOLUTION RESPIRATORY (INHALATION) at 07:36

## 2019-02-28 RX ADMIN — IPRATROPIUM BROMIDE AND ALBUTEROL SULFATE 3 ML: 2.5; .5 SOLUTION RESPIRATORY (INHALATION) at 00:20

## 2019-02-28 RX ADMIN — LOSARTAN POTASSIUM 50 MG: 50 TABLET, FILM COATED ORAL at 13:58

## 2019-02-28 RX ADMIN — METHYLPREDNISOLONE SODIUM SUCCINATE 62.5 MG: 125 INJECTION, POWDER, FOR SOLUTION INTRAMUSCULAR; INTRAVENOUS at 09:03

## 2019-02-28 ASSESSMENT — ACTIVITIES OF DAILY LIVING (ADL)
ADLS_ACUITY_SCORE: 12

## 2019-02-28 NOTE — DISCHARGE SUMMARY
[facility]    Discharge Summary  Hospitalist    Date of Admission:  2/27/2019  Date of Discharge:  2/28/2019  Discharging Provider: Oumar Jett MD    Discharge Diagnoses     Suspected COPD exacerbation  Acute bronchitis  Benign essential hypertension  Prediabetes  Obstructive sleep apnea  Hyperlipidemia     Hospital Course   Martin Anguiano is a 53 year old male with a PMH of previous tobacco dependence, HTN, DLD and JOJO who is transferred OSH for evaluation of suspected COPD exacerbation.     Suspected COPD exacerbation: No formal dx of COPD but history smoking, quit 6 months ago. Presented to OSH with 3 days of cough and SOB. CXR clear. -Initially placed on Solumedrol IV 60 bid, will transition to prednisone 40 mg daily times 5 days  -Continue azithromycin to complete 5-day course, likely has acute bronchitis with cough and yellow sputum production.  -Prescription for duo nebs and nebulizer given  -We will need outpatient pulmonary function test in 2-3 weeks.  -Likely has undiagnosed COPD as per the patient and his wife he has had wheezing episodes within the last 2 years especially when he gets URI especially on the background of significant smoking history.  -Patient much improved, still having expiratory wheezes, I advised him that staying another night in the hospital would be reasonable however he felt that he was significantly improved and wanted to go home.      HTN:  - Continue Losartan     H/o pre-DM. A1C 6.1  -Expect blood sugars to be slightly on the higher side while on steroids     JOJO  - CPAP per home setting     DLD  - Continue statin          # Discharge Pain Plan:   - Patient currently has NO PAIN and is not being prescribed pain medications on discharge.      Oumar Jett MD    Significant Results and Procedures   See below    Pending Results     Unresulted Labs Ordered in the Past 30 Days of this Admission     No orders found for last 61 day(s).          Code Status   Full  Code       Primary Care Physician   Johana Tamez    Physical Exam   Temp: 96.4  F (35.8  C) Temp src: Oral BP: 174/71 Pulse: 90   Resp: 18 SpO2: 96 % O2 Device: None (Room air)      Constitutional: AAOX3, NAD, Appears comfortable  Neck- Supple, Good ROM, No JVD  Respiratory: CTA B/L, Normal WOB, No crackles or wheezes  Cardiovascular: RRR, No murmur  GI: Soft, Non- tender, BS- normoactive  Skin/Integument: Warm and dry, no rashes  MSK: No joint deformity or swelling, no edema  Neuro: CN- grossly intact, Motor strength 5/5 on all 4 extremities.     Discharge Disposition   Discharged to home  Condition at discharge: Stable    Consultations This Hospital Stay   RESPIRATORY CARE IP CONSULT  SOCIAL WORK IP CONSULT    Time Spent on this Encounter   IOumar, personally saw the patient today and spent less than or equal to 30 minutes discharging this patient.    Discharge Orders      Follow-up and recommended labs and tests    Follow up with primary care provider, Johana Tamez, within 7 days for hospital follow- up.  The following labs/tests are recommended: PFT in 2-3 weeks     Activity    Your activity upon discharge: activity as tolerated     Full Code     Diet    Follow this diet upon discharge: Orders Placed This Encounter      Combination Diet Regular Diet Adult       Discharge Medications   Current Discharge Medication List      START taking these medications    Details   azithromycin (ZITHROMAX) 250 MG tablet Take 1 tablet (250 mg) by mouth daily  Qty: 3 tablet, Refills: 0    Associated Diagnoses: COPD with acute exacerbation (H)      ipratropium - albuterol 0.5 mg/2.5 mg/3 mL (DUONEB) 0.5-2.5 (3) MG/3ML neb solution Take 1 vial (3 mLs) by nebulization every 4 hours as needed for shortness of breath / dyspnea or wheezing  Qty: 360 mL, Refills: 0    Associated Diagnoses: COPD with acute exacerbation (H)      order for DME Equipment being ordered:Nebulizer  Treatment Diagnosis: COPD  Qty: 1 Device,  "Refills: 0    Associated Diagnoses: COPD with acute exacerbation (H)      predniSONE (DELTASONE) 20 MG tablet Take 40 mg by mouth daily for 5 days.  Qty: 10 tablet, Refills: 0    Associated Diagnoses: COPD with acute exacerbation (H)         CONTINUE these medications which have NOT CHANGED    Details   albuterol (PROAIR HFA/PROVENTIL HFA/VENTOLIN HFA) 108 (90 Base) MCG/ACT inhaler INHALE 2 PUFFS INTO THE LUNGS EVERY 6 HOURS AS NEEDED FOR SHORTNESS OF BREATH OR DIFFICULT BREATHING OR WHEEZING  Qty: 18 g, Refills: 0    Associated Diagnoses: Cough      atorvastatin (LIPITOR) 40 MG tablet TAKE 1 TABLET(40 MG) BY MOUTH DAILY  Qty: 90 tablet, Refills: 1    Associated Diagnoses: Encounter for routine adult health examination without abnormal findings; Hypercholesterolemia      losartan (COZAAR) 50 MG tablet Take 1 tablet (50 mg) by mouth daily  Qty: 30 tablet, Refills: 2    Associated Diagnoses: Benign essential hypertension           Allergies   Allergies   Allergen Reactions     Ibuprofen      Other reaction(s): Other - Describe In Comment Field  Uri type of symptoms; \"watery eyes\". Gel caps are  OK  Eyes water,runny nose     Lisinopril Cough     Data   Most Recent 3 CBC's:  Recent Labs   Lab Test 02/28/19  0730   WBC 9.8   HGB 15.5   MCV 91         Most Recent 3 BMP's:  Recent Labs   Lab Test 02/28/19  0730 10/09/18  1043 05/08/18  1132    136 139   POTASSIUM 4.3 4.6 4.4   CHLORIDE 107 103 106   CO2 23 27 26   BUN 15 17 13   CR 0.91 0.97 0.98   ANIONGAP 7 6 7   JAY 8.9 9.1 9.3   * 117* 123*     Most Recent 2 LFT's:  Recent Labs   Lab Test 10/09/18  1043 05/08/18  1132   AST 46* 38   ALT 66 68   ALKPHOS 76 86   BILITOTAL 0.6 0.8     Most Recent INR's and Anticoagulation Dosing History:  Anticoagulation Dose History     There is no flowsheet data to display.        Most Recent 3 Troponin's:No lab results found.  Most Recent Cholesterol Panel:  Recent Labs   Lab Test 05/08/18  1132   CHOL 121   LDL 55 "   HDL 40   TRIG 132     Most Recent 6 Bacteria Isolates From Any Culture (See EPIC Reports for Culture Details):No lab results found.  Most Recent TSH, T4 and A1c Labs:  Recent Labs   Lab Test 10/09/18  1043   A1C 6.1*       No results found for this or any previous visit.

## 2019-02-28 NOTE — PHARMACY-ADMISSION MEDICATION HISTORY
Admission medication history interview status for the 2/27/2019  admission is complete. See EPIC admission navigator for prior to admission medications     Medication history source reliability:Good    Actions taken by pharmacist (provider contacted, etc):None     Additional medication history information not noted on PTA med list :None    Medication reconciliation/reorder completed by provider prior to medication history? No    Time spent in this activity: 10min    Prior to Admission medications    Medication Sig Last Dose Taking? Auth Provider   albuterol (PROAIR HFA/PROVENTIL HFA/VENTOLIN HFA) 108 (90 Base) MCG/ACT inhaler INHALE 2 PUFFS INTO THE LUNGS EVERY 6 HOURS AS NEEDED FOR SHORTNESS OF BREATH OR DIFFICULT BREATHING OR WHEEZING  Yes Johana Tamez MD   atorvastatin (LIPITOR) 40 MG tablet TAKE 1 TABLET(40 MG) BY MOUTH DAILY 2/27/2019 at Unknown time Yes Johana Tamez MD   losartan (COZAAR) 50 MG tablet Take 1 tablet (50 mg) by mouth daily 2/27/2019 at Unknown time Yes Johana Tamez MD

## 2019-02-28 NOTE — H&P
[facility]    History and Physical - Hospitalist Service       Date of Admission:  (Not on file)    Assessment & Plan   Martin Anguiano is a 53 year old male with a PMH of previous tobacco dependence, HTN, DLD and JOJO who is transferred OSH for evaluation of suspected COPD exacerbation.    Cough and SOB  Suspected COPD exacerbation: No formal dx of COPD but history smoking, quit 6 months ago. Presented to OSH with 3 days of cough and SOB. CXR clear. Continued to have significant wheezing and work of breathing despite steroids and nebs  - Solumedrol IV 60 bid  - Azithromycin  - Duonebs qid  - RCAT consult.  - Will need PFTs as outpatient.     HTN: PTA regimen includes Losartan 50 mg/d  - Continue Losartan    H/o pre-DM. A1C 6.1  - Will monitor BG qid for steroid induced hyperglycemia  - SSI available    JOJO  - CPAP per home setting    DLD  - Continue statin     Diet: Regular  DVT Prophylaxis: Pneumatic Compression Devices  Dick Catheter: not present  Code Status: Full    Disposition Plan   Expected discharge: 1-2 days.   Entered: Aleja England PA-C 02/27/2019, 8:04 PM     The patient's care was discussed with the Patient and Patient's Family.    Chidi Oden  [facility]    Patient seen and examined.  Agree with impression and plan.     Saad Lawrence  Pager: [phone]  Cell Phone:  [phone]      ______________________________________________________________________    Chief Complaint   SOB    History is obtained from the patient    History of Present Illness   Martin Anguiano is a 53 year old male with a PMH HTN and tobacco dependence who is directly admitted from OSH for evaluation of suspect COPD exacerbation. Does not have a history of COPD but previous tobacco dependence with > 30 pack years. He recently quit about 6 months ago. Presented with increased SOB and cough x 3 days. No fever/chills. No chest pain.    He was noted to have increased work of  breathing and significant wheezing. Was not noted to be hypoxic. CXR was clear. CBC without leukocytosis. He was treated with nebulizer and steroids, but continued to have significant work of breathing so admission was requested.     Presently, patient is evaluated in his hospital room. Denies chest pain. Feels improved from OSH but still very tight and SOB. No fevers/chills. Cough is productive of clear/yellow sputum.        Review of Systems    The 10 point Review of Systems is negative other than noted in the HPI or here.    Past Medical History    I have reviewed this patient's medical history and updated it with pertinent information if needed.   Past Medical History:   Diagnosis Date     HTN (hypertension)      Hypercholesteremia      JOJO (obstructive sleep apnea)      Pre-diabetes        Past Surgical History   I have reviewed this patient's surgical history and updated it with pertinent information if needed.  Past Surgical History:   Procedure Laterality Date     NO HISTORY OF SURGERY         Social History   I have reviewed this patient's social history and updated it with pertinent information if needed.  Social History     Tobacco Use     Smoking status: Former Smoker     Packs/day: 1.00     Years: 20.00     Pack years: 20.00     Types: Cigarettes     Last attempt to quit: 10/4/2016     Years since quittin.4     Smokeless tobacco: Never Used     Tobacco comment: last 10 years were off and on, about 2016   Substance Use Topics     Alcohol use: Yes     Alcohol/week: 0.0 oz     Comment: 1-2 times per week, 2 drinks     Drug use: No       Family History   I have reviewed this patient's family history and updated it with pertinent information if needed.   Family History   Problem Relation Age of Onset     Diabetes Mother      Diabetes Brother      Hypertension Father      Hyperlipidemia Father      Coronary Artery Disease No family hx of      Cerebrovascular Disease No family hx of      Breast Cancer No  "family hx of      Colon Cancer No family hx of      Prostate Cancer No family hx of      Other Cancer No family hx of      Depression No family hx of      Anxiety Disorder No family hx of      Mental Illness No family hx of      Substance Abuse No family hx of      Anesthesia Reaction No family hx of      Asthma No family hx of      Osteoporosis No family hx of      Genetic Disorder No family hx of      Thyroid Disease No family hx of      Obesity No family hx of      Unknown/Adopted No family hx of        Prior to Admission Medications   Cannot display prior to admission medications because the patient has not been admitted in this contact.     Allergies   Allergies   Allergen Reactions     Ibuprofen      Other reaction(s): Other - Describe In Comment Field  Uri type of symptoms; \"watery eyes\". Gel caps are  OK  Eyes water,runny nose     Lisinopril Cough       Physical Exam   Vital Signs:                    Weight: 0 lbs 0 oz    Constitutional: Alert, increased work of breath  Respiratory: Increased effort, diffuse expiratory wheezing  Cardiovascular: tachycardic, regular no murmurs   GI: Non distended, normal bowels sounds, no tenderness or guarding  MSK: LE without edema. Dorsalis pedis pulse palpated bilaterally.   Skin/Integumen: Clear  Neuro: CN II-XII grossly intact  Psych:  Alert and oriented x 3. Normal affect    Data   Data reviewed today: I reviewed all medications, new labs and imaging results over the last 24 hours. I personally reviewed no images or EKG's today.    No lab results found in last 7 days.    "

## 2019-02-28 NOTE — PROGRESS NOTES
Patient is on RA with SpO2 in the low to mid 90's. BS diminished with some expiratory wheezes at times. All nebs were given as ordered.  Will cont to follow.  2/28/2019  Shanice Elisa RRT

## 2019-02-28 NOTE — UTILIZATION REVIEW
"  Admission Status; Secondary Review Determination         Under the authority of the Utilization Management Committee, the utilization review process indicated a secondary review on the above patient.  The review outcome is based on review of the medical records, discussions with staff, and applying clinical experience noted on the date of the review.          (x) Observation Status Appropriate - This patient does not meet hospital inpatient criteria and is placed in observation status. If this patient's primary payer is Medicare and was admitted as an inpatient, Condition Code 44 should be used and patient status changed to \"observation\".     RATIONALE FOR DETERMINATION   53 year old male with a PMH of previous tobacco dependence, HTN, DLD and JOJO who is transferred OSH for evaluation of suspected COPD exacerbation. No pneumonia no hypoxia expected discharge today. The severity of illness, intensity of service provided, expected LOS and risk for adverse outcome make the care appropriate for further observation; however, doesn't meet criteria for hospital inpatient admission. Dr. Fu  notified of this determination.    This document was produced using voice recognition software.      The information on this document is developed by the utilization review team in order for the business office to ensure compliance.  This only denotes the appropriateness of proper admission status and does not reflect the quality of care rendered.         The definitions of Inpatient Status and Observation Status used in making the determination above are those provided in the CMS Coverage Manual, Chapter 1 and Chapter 6, section 70.4.      Sincerely,     MARICEL JUSTICE MD    System Medical Director  Utilization Management  [facility].      "

## 2019-02-28 NOTE — PROVIDER NOTIFICATION
MD Notification    Person notified: Dr. Jett    Person Name: Ana Austin RN    Date/Time: 2/28/19@7576    Interaction: Paged    Purpose of Notification: Pt's insurance is not covering stay in hospital and requesting to talk to MD. Patient would like to discharge today.    Orders Received: Awaiting call back.

## 2019-02-28 NOTE — CONSULTS
Care Transition Initial Assessment - RN       Met with: Patient regarding questions regarding his insurance.  Per pt he found out he has a large deductible for his insurance. Pt stated he concerned about paying for his deductible.  Offered [facility] financial Counselor offered and pt stated he would like to speak with one.  Financial Counselor called and spoke with Ana Cristina-per Ana Cristina she would call pt's to discuss community care.  Pt with admit dx of COPD exacerbation.  Offered to make PCP follow-up and pt stated he would like his appointment.  PCP follow-up and appointment date & time on [facility].  DATA   Active Problems:    COPD (chronic obstructive pulmonary disease) (H)    COPD with acute exacerbation (H)       Cognitive Status: awake.        Contact information and PCP information verified: Yes  Lives With: spouse                     Insurance concerns: Insurance Plan  ASSESSMENT  Patient currently receives the following services:  none        Identified issues/concerns regarding health management: none    PLAN  Financial costs for the patient include pt concerned about insurance deductible.  Patient given options and choices for discharge N/A .  Patient/family is agreeable to the plan?  Yes: home  Patient anticipates discharging to home .        Patient anticipates needs for home equipment: No  Plan/Disposition: Home   Appointments: See [facility]      Care  (CTS) will continue to follow as needed.

## 2019-02-28 NOTE — PLAN OF CARE
Pt is A&Ox4. VSS ex elevated BP. Denies pain. LS diminished and expiratory wheezes noted. PIV SL. Up in in room. Plan to do scheduled nebs per RT and IV steroids. Slept between cares.

## 2019-02-28 NOTE — PROGRESS NOTES
Patient is A&Ox4. VSS ex elevated B/P at times. Lung sounds diminished with expiratory wheezes. Regular diet, good appetite. PIV SL. Up independently in room. SBA in hallway. Patient ambulated in hallway this afternoon and the lowest oxygen reading was 92% on room air. SW consult entered. Plan: Patient planning to discharge to home this evening between 1128-7352. Discharge medications in drawer.

## 2019-02-28 NOTE — PROVIDER NOTIFICATION
MD Notification    Person notified: Dr. Maher    Person Name: Ana Austin RN    Date/Time: 2/29/19@9906    Interaction: Paged    Purpose of Notification: Pt requesting to be discharged today d/t insurance issues. Also requesting referral for breathing specialist be included in orders as well as prescription for nebs.    Orders Received: MD came up to see patient and entered discharge orders.

## 2019-03-01 ENCOUNTER — TELEPHONE (OUTPATIENT)
Dept: FAMILY MEDICINE | Facility: CLINIC | Age: 54
End: 2019-03-01

## 2019-03-01 ENCOUNTER — PATIENT OUTREACH (OUTPATIENT)
Dept: CARE COORDINATION | Facility: CLINIC | Age: 54
End: 2019-03-01

## 2019-03-01 DIAGNOSIS — J44.9 COPD (CHRONIC OBSTRUCTIVE PULMONARY DISEASE) (H): Primary | ICD-10-CM

## 2019-03-01 ASSESSMENT — ACTIVITIES OF DAILY LIVING (ADL): DEPENDENT_IADLS:: INDEPENDENT

## 2019-03-01 NOTE — LETTER
[facility]  Complex Care Plan  About Me  Patient Name:  Martin Anguiano    YOB: 1965  Age:     53 year old   [place] MRN:   [id number] Telephone Information:  Home Phone [phone]   Mobile [phone]       Address:    [address] [place] [address] Email address:  [email or web address]      Emergency Contact(s)  Name Relationship Lgl Grd Work Phone Home Phone Mobile Phone   1. MANJINDER GALICIA Friend No  [phone]            Primary language:  English     needed? No   [employer]:  [phone] op. 1  Other communication barriers: None  Preferred Method of Communication:  Mail  Current living arrangement:    Mobility Status/ Medical Equipment: Independent    Health Maintenance  Health Maintenance Reviewed:      My Access Plan  Medical Emergency 911   [facility] [phone]   24 Hour Appointment Line [phone] or  [phone]) (toll-free)   24 Hour Nurse Line [phone] (toll-free)   Preferred Urgent Care     Preferred Hospital [facility]  [phone]   [facility] - [place], [place] - [address] AT [facility] [place] [facility] [employer] [address]     Behavioral Health Crisis Line The National Suicide Prevention Lifeline at 1-660.796.9944 or 917     My Care Team Members    Patient Care Team       Relationship Specialty Notifications Start End    Johana Tamez MD PCP - General Family Practice  8/5/16     Phone: [phone] Fax: [phone]         [facility] [employer] [address] [address]    Johana Tamez MD PCP - Assigned PCP   2/13/16     Phone: [phone] Fax: [phone] [address] [employer] [address] [address]    Katie Brooke, RN Lead Care Coordinator Primary Care - [facility] Admissions 3/1/19     Phone: [phone] Fax: [phone]                My Care Plans  Self Management and Treatment Plan  Goals and  (Comments)  Goals        General    Financial Wellbeing 2  (pt-stated)     Notes - Note edited  3/1/2019  1:50 PM by Katie Brooke, RN    Goal 2  Statement: I will reach out to  if I need more help with hospital bill   Measure of Success: completed   Supportive Steps to Achieve: Clinic Care Coordinator RN outreach   Barriers: none  Strengths: desire to resolve issue.   Date to Achieve By: 5/1/19  Patient expressed understanding of goal: yes           Improve chronic symptoms 1 (pt-stated)     Notes - Note created  3/1/2019  1:59 PM by Katie Brooke, RN    Goal 1  Statement: I will use my nebs and take medication as directed   Measure of Success: completed  Supportive Steps to Achieve: patient verbalizes understanding of directions   Barriers: none  Strengths: patient verbalizes understanding of directions   Date to Achieve By: 5/1/19  Patient expressed understanding of goal: yes                  Action Plans on File:                       Advance Care Plans/Directives Type:        My Medical and Care Information  Problem List   Patient Active Problem List   Diagnosis     Benign essential hypertension     JOJO (obstructive sleep apnea)     Family history of diabetes mellitus     Glucose intolerance (impaired glucose tolerance)     Non morbid obesity due to excess calories     Screening for prostate cancer     Mixed hyperlipidemia     Tobacco use disorder [age]y/o @ 1.5ppd for 15 yrs then 1ppd=31-32 pk yr hx     Chronic coronary artery disease     Impotence of organic origin     Family history of coronary artery disease     Gastroesophageal reflux disease     History of vasectomy     Hypercholesterolemia     Vitamin D deficiency     Cellulitis and abscess of leg-healing and closing      Obesity (BMI 35.0-39.9) with comorbidity (H)     COPD (chronic obstructive pulmonary disease) (H)     COPD with acute exacerbation (H)      Current Medications and Allergies:  See printed Medication Report.    Care  Coordination Start Date: 3/1/2019   Frequency of Care Coordination: weekly   Form Last Updated: 03/01/2019

## 2019-03-01 NOTE — PROGRESS NOTES
Patient discharged to home with family member.  Denied pain at time of discharge.  Writer went over AVS with patient; patient aware of follow up appointment, discharge instructions, when to contact the doctor/seek medical attention, new medications and when/how to take them, etc.  Writer asked patient if he was comfortable with his nebulizer machine and how to use it, and the patient stated that he was comfortable with it and that he had used one in the past.  PIV removed, intact.

## 2019-03-01 NOTE — PROGRESS NOTES
Clinic Care Coordination Contact    Clinic Care Coordination Contact  OUTREACH    Referral Information:  Referral Source: IP Handoff    Primary Diagnosis: COPD    Chief Complaint   Patient presents with     Clinic Care Coordination - [facility]        [place] Utilization:   Clinic Utilization  Difficulty keeping appointments:: No  Compliance Concerns: No  No-Show Concerns: No  Utilization    Last refreshed: 3/1/2019  9:40 AM:  Hospital Admissions 1           Last refreshed: 3/1/2019  9:40 AM:  ED Visits 0           Last refreshed: 3/1/2019  9:40 AM:  No Show Count (past year) 0              Current as of: 3/1/2019  9:40 AM              Clinical Concerns:  Current Medical Concerns:  Patient was hosptilized at [facility]   Date of Admission:  2/27/2019  Date of Discharge:  2/28/2019  Discharge Diagnoses  Suspected COPD exacerbation  Acute bronchitis  Benign essential hypertension  Prediabetes  Obstructive sleep apnea  Hyperlipidemia        Hospital Course     Martin Anguiano is a 53 year old male with a PMH of previous tobacco dependence, HTN, DLD and JOJO who is transferred OSH for evaluation of suspected COPD exacerbation.     Suspected COPD exacerbation: No formal dx of COPD but history smoking, quit 6 months ago. Presented to OSH with 3 days of cough and SOB. CXR clear. -Initially placed on Solumedrol IV 60 bid, will transition to prednisone 40 mg daily times 5 days  -Continue azithromycin to complete 5-day course, likely has acute bronchitis with cough and yellow sputum production.  -Prescription for duo nebs and nebulizer given  -We will need outpatient pulmonary function test in 2-3 weeks.  -Likely has undiagnosed COPD as per the patient and his wife he has had wheezing episodes within the last 2 years especially when he gets URI especially on the background of significant smoking history.  -Patient much improved, still having expiratory wheezes, I advised him that staying another night in the  hospital would be reasonable however he felt that he was significantly improved and wanted to go home.      HTN:  - Continue Losartan     H/o pre-DM. A1C 6.1  -Expect blood sugars to be slightly on the higher side while on steroids     JOJO  - CPAP per home setting     DLD  - Continue statin    Clinic Care Coordinator RN spoke with patient today.  He reports he is better but still has wheezing.  Clinic Care Coordinator RN instructed patient to continue nebs and medications as directed.   Patient has a follow up appointment Tuesday March 4th with PCP.  Clinic Care Coordinator RN instructed patient to call on call MD/RNs over the weekend if he has questions or concerns.   Patient asked if he can take Muconex . Clinic Care Coordinator RN told patient Mucinex is ok to take.  Educated patient that Mucinex and Robituissin are the same ingredient.     Clinic Care Coordinator RN asked patient about financial concerns.  Patient has a $10,000 deducible. Patient meet with  in the hospital.  Patient reports he does not need futher assistance at this time. Clinic Care Coordinator RN suggested patient call his insurance company as patient stated he thought he had a different plan.       Clinical Pathway: Clinic Care Coordination COPD Assessment    Discharge:      Symptom Review:    Symptoms  Anxiety: No  Chest pain: : No  Chills: No  Cough: Yes  Is your cough:: Productive  Fever: No  Fatigue: No  Increased sputum: Yes  Sputum Color: Clear  Sputum Consistency: Thick  Night sweats: No  Weight change: : No  Wheezing: Yes  COPD symptoms limiting activities?: No  What COPD zone are you currently in?: Yellow  Taking COPD medications as prescribed: : Yes  Took steroids (by mouth) for COPD: Yes  Overall your COPD symptoms are (GOAL):: Stable    Oxygen/DME:    Review with patient how to use/maintain nebulizers and inhalers: yes    A    Emotions/Lifestyle:    Do you smoke?  reports that he quit smoking about 2 years ago. His  smoking use included cigarettes. He has a 20.00 pack-year smoking history. he has never used smokeless tobacco.    Functional Status:  Dependent ADLs:: Independent  Dependent IADLs:: Independent  Bed or wheelchair confined:: No  Mobility Status: Independent    Living Situation:       Diet/Exercise/Sleep:       Transportation:  Transportation concerns (GOAL):: No  Transportation means:: Accessible car     Psychosocial:  Informal Support system:: Friends     Financial/Insurance:   Financial/Insurance concerns (GOAL):: Yes       Resources and Interventions:  Current Resources:    ;   Community Resources: (hospitals )  Supplies used at home:: Nebulizer tubing  Equipment Currently Used at Home: none    Advance Care Plan/Directive  Advanced Care Plans/Directives on file:: No    Referrals Placed: None     Goals:   Goals        General    Financial Wellbeing 2  (pt-stated)     Notes - Note edited  3/1/2019  1:50 PM by Katie Brooke RN    Goal 2  Statement: I will reach out to  if I need more help with hospital bill   Measure of Success: completed   Supportive Steps to Achieve: Clinic Care Coordinator RN outreach   Barriers: none  Strengths: desire to resolve issue.   Date to Achieve By: 5/1/19  Patient expressed understanding of goal: yes           Improve chronic symptoms 1 (pt-stated)     Notes - Note created  3/1/2019  1:59 PM by Katie Brooke RN    Goal 1  Statement: I will use my nebs and take medication as directed   Measure of Success: completed  Supportive Steps to Achieve: patient verbalizes understanding of directions   Barriers: none  Strengths: patient verbalizes understanding of directions   Date to Achieve By: 5/1/19  Patient expressed understanding of goal: yes                   Patient/Caregiver understanding: yes     Outreach Frequency: weekly  Future Appointments              In 4 days Johana Tamez MD [facility]          Plan: Patient will follow up  with PCP on .   Patient will call with clinic with questions or concerns.            Name: Martin Anguiano  : 1965  MRN #: [id number]  Primary Care Provider: Johana Tamez  Primary Care MD Name: Dr. Tamez  Primary Clinic: [address] [facility] [address]   [facility] [address]  Primary Care Clinic Name: [facility]  Reason for Hospitalization:  Shortness of breath, COPD  COPD (chronic obstructive pulmonary disease) (H)  COPD with acute exacerbation (H)  Admit Date/Time: 2019  9:57 PM  Discharge Date: 2019     Payor Source: Payor: UCARE / Plan: [employer] INDIVIDUAL FAMILY PLANS WITH [facility] / Product Type: HMO /      Readmission Assessment Measure (ILDA) Risk Score/category: average                             Reason for Communication Hand-off Referral: Admission diagnoses: COPD     Discharge Plan:        Concern for non-adherence with plan of care:                 Y/N N  Discharge Needs Assessment:      Needs      Most Recent Value   # of Referrals Placed by [facility]  Internal Clinic Care Coordination, Scheduled Follow-up appointments, Communication hand-offs to next level of Care Providers             Already enrolled in Tele-monitoring program and name of program:  N/A  Follow-up specialty is recommended: No    Follow-up plan:    Future Appointments   Date Time Provider Department Center   3/5/2019 10:00 AM Johana Tamez MD [facility]         Any outstanding tests or procedures:               Key Recommendations:    Suspected COPD exacerbation: No formal dx of COPD but history smoking, quit 6 months ago. Presented to OSH with 3 days of cough and SOB. CXR clear. Continued to have significant wheezing and work of breathing despite steroids and nebs  Pt received IV steroids and was discharged on prednisone, nebs and neb machine.  Pt had concerns regarding his large deductible with his insurance plan.  Pt was able to talk with our Financial Counselor.         Current Discharge Medication List            START  taking these medications     Details   azithromycin (ZITHROMAX) 250 MG tablet Take 1 tablet (250 mg) by mouth daily  Qty: 3 tablet, Refills: 0     Associated Diagnoses: COPD with acute exacerbation (H)       ipratropium - albuterol 0.5 mg/2.5 mg/3 mL (DUONEB) 0.5-2.5 (3) MG/3ML neb solution Take 1 vial (3 mLs) by nebulization every 4 hours as needed for shortness of breath / dyspnea or wheezing  Qty: 360 mL, Refills: 0     Associated Diagnoses: COPD with acute exacerbation (H)       order for DME Equipment being ordered:Nebulizer  Treatment Diagnosis: COPD  Qty: 1 Device, Refills: 0     Associated Diagnoses: COPD with acute exacerbation (H)       predniSONE (DELTASONE) 20 MG tablet Take 40 mg by mouth daily for 5 days.  Qty: 10 tablet, Refills: 0     Associated Diagnoses: COPD with acute exacerbation (H)                CONTINUE these medications which have NOT CHANGED     Details   albuterol (PROAIR HFA/PROVENTIL HFA/VENTOLIN HFA) 108 (90 Base) MCG/ACT inhaler INHALE 2 PUFFS INTO THE LUNGS EVERY 6 HOURS AS NEEDED FOR SHORTNESS OF BREATH OR DIFFICULT BREATHING OR WHEEZING  Qty: 18 g, Refills: 0     Associated Diagnoses: Cough       atorvastatin (LIPITOR) 40 MG tablet TAKE 1 TABLET(40 MG) BY MOUTH DAILY  Qty: 90 tablet, Refills: 1     Associated Diagnoses: Encounter for routine adult health examination without abnormal findings; Hypercholesterolemia       losartan (COZAAR) 50 MG tablet Take 1 tablet (50 mg) by mouth daily  Qty: 30 tablet, Refills: 2     Associated Diagnoses: Benign essential hypertension

## 2019-03-01 NOTE — TELEPHONE ENCOUNTER
ED/Discharge Protocol    Newly diagnosed with COPD.  Per ED protocol contact Care Coordination    Will route message to ADRIANA Guo RN

## 2019-03-05 ENCOUNTER — ANCILLARY PROCEDURE (OUTPATIENT)
Dept: GENERAL RADIOLOGY | Facility: CLINIC | Age: 54
End: 2019-03-05
Attending: FAMILY MEDICINE
Payer: COMMERCIAL

## 2019-03-05 ENCOUNTER — OFFICE VISIT (OUTPATIENT)
Dept: FAMILY MEDICINE | Facility: CLINIC | Age: 54
End: 2019-03-05
Payer: COMMERCIAL

## 2019-03-05 VITALS
WEIGHT: 283.2 LBS | OXYGEN SATURATION: 96 % | TEMPERATURE: 97.1 F | BODY MASS INDEX: 39.65 KG/M2 | DIASTOLIC BLOOD PRESSURE: 75 MMHG | HEART RATE: 70 BPM | SYSTOLIC BLOOD PRESSURE: 123 MMHG | RESPIRATION RATE: 18 BRPM | HEIGHT: 71 IN

## 2019-03-05 DIAGNOSIS — J40 BRONCHITIS: ICD-10-CM

## 2019-03-05 DIAGNOSIS — E66.01 MORBID OBESITY (H): ICD-10-CM

## 2019-03-05 DIAGNOSIS — R06.2 WHEEZING: Primary | ICD-10-CM

## 2019-03-05 DIAGNOSIS — R06.2 WHEEZING: ICD-10-CM

## 2019-03-05 PROCEDURE — 99495 TRANSJ CARE MGMT MOD F2F 14D: CPT | Performed by: FAMILY MEDICINE

## 2019-03-05 PROCEDURE — 71046 X-RAY EXAM CHEST 2 VIEWS: CPT | Mod: FY

## 2019-03-05 RX ORDER — PREDNISONE 10 MG/1
TABLET ORAL
Qty: 10 TABLET | Refills: 0 | Status: SHIPPED | OUTPATIENT
Start: 2019-03-05 | End: 2019-07-11

## 2019-03-05 RX ORDER — AZITHROMYCIN 250 MG/1
TABLET, FILM COATED ORAL
Qty: 6 TABLET | Refills: 0 | Status: SHIPPED | OUTPATIENT
Start: 2019-03-05 | End: 2019-03-10

## 2019-03-05 RX ORDER — FLUTICASONE PROPIONATE 220 UG/1
1 AEROSOL, METERED RESPIRATORY (INHALATION) 2 TIMES DAILY
Qty: 1 INHALER | Refills: 3 | Status: SHIPPED | OUTPATIENT
Start: 2019-03-05 | End: 2021-08-24

## 2019-03-05 ASSESSMENT — MIFFLIN-ST. JEOR: SCORE: 2161.72

## 2019-03-05 NOTE — PROGRESS NOTES
SUBJECTIVE:   Martin Anguiano is a 53 year old male who presents to clinic today for the following health issues:          Hospital Follow-up Visit:    Hospital/Nursing Home/IP Rehab Facility: [facility]  Date of Admission:   2/27/2019  Date of Discharge: 2/28/2019  Reason(s) for Admission: wheezing and coughing which thought to be related to chronic lung disease, because of his smoking history and wheezing after URI in the past couple of yrs   Pt states that both mom and sister and one brother have been diagnosed with late onset asthma, they are using inhalers and fine outside of the URI moments . He has never used an inhaler   He quit smoking 2 yrs ago and had a 20 yrs of pps hx before that   States that now he is feeling better as far as wheezing but still coughs and gets sputum out , no fever no SOB , he is done with the 5 days course of prednisone at 40 mg , today last dose and finished the Zpack three days ago, states that the Z pack helped but now is started to get sputum coming up again .             Problems taking medications regularly:  None       Medication changes since discharge: None       Problems adhering to non-medication therapy:  None    Summary of hospitalization:  [facility] discharge summary reviewed  Diagnostic Tests/Treatments reviewed.  Follow up needed: none  Other Healthcare Providers Involved in Patient s Care:         None  Update since discharge: improved.     Post Discharge Medication Reconciliation: discharge medications reconciled, continue medications without change.  Plan of care communicated with patient     Coding guidelines for this visit:  Type of Medical   Decision Making Face-to-Face Visit       within 7 Days of discharge Face-to-Face Visit        within 14 days of discharge   Moderate Complexity 42040 [id number]   High Complexity 61208 [id number]                  Problem list and histories reviewed & adjusted, as indicated.  Additional history: as  documented    Patient Active Problem List   Diagnosis     Benign essential hypertension     JOJO (obstructive sleep apnea)     Family history of diabetes mellitus     Glucose intolerance (impaired glucose tolerance)     Non morbid obesity due to excess calories     Screening for prostate cancer     Mixed hyperlipidemia     Tobacco use disorder [age]y/o @ 1.5ppd for 15 yrs then 1ppd=31-32 pk yr hx     Chronic coronary artery disease     Impotence of organic origin     Family history of coronary artery disease     Gastroesophageal reflux disease     History of vasectomy     Hypercholesterolemia     Vitamin D deficiency     Cellulitis and abscess of leg-healing and closing      Obesity (BMI 35.0-39.9) with comorbidity (H)     Past Surgical History:   Procedure Laterality Date     NO HISTORY OF SURGERY         Social History     Tobacco Use     Smoking status: Former Smoker     Packs/day: 1.00     Years: 20.00     Pack years: 20.00     Types: Cigarettes     Last attempt to quit: 10/4/2016     Years since quittin.4     Smokeless tobacco: Never Used     Tobacco comment: last 10 years were off and on, about 2016   Substance Use Topics     Alcohol use: Yes     Alcohol/week: 0.0 oz     Comment: 1-2 times per week, 2 drinks     Family History   Problem Relation Age of Onset     Diabetes Mother      Diabetes Brother      Hypertension Father      Hyperlipidemia Father      Coronary Artery Disease No family hx of      Cerebrovascular Disease No family hx of      Breast Cancer No family hx of      Colon Cancer No family hx of      Prostate Cancer No family hx of      Other Cancer No family hx of      Depression No family hx of      Anxiety Disorder No family hx of      Mental Illness No family hx of      Substance Abuse No family hx of      Anesthesia Reaction No family hx of      Asthma No family hx of      Osteoporosis No family hx of      Genetic Disorder No family hx of      Thyroid Disease No family hx of      Obesity  "No family hx of      Unknown/Adopted No family hx of          Current Outpatient Medications   Medication Sig Dispense Refill     albuterol (PROAIR HFA/PROVENTIL HFA/VENTOLIN HFA) 108 (90 Base) MCG/ACT inhaler INHALE 2 PUFFS INTO THE LUNGS EVERY 6 HOURS AS NEEDED FOR SHORTNESS OF BREATH OR DIFFICULT BREATHING OR WHEEZING 18 g 0     atorvastatin (LIPITOR) 40 MG tablet TAKE 1 TABLET(40 MG) BY MOUTH DAILY 90 tablet 1     azithromycin (ZITHROMAX) 250 MG tablet Take 2 tablets (500 mg) by mouth daily for 1 day, THEN 1 tablet (250 mg) daily for 4 days. 6 tablet 0     azithromycin (ZITHROMAX) 250 MG tablet Take 1 tablet (250 mg) by mouth daily 3 tablet 0     fluticasone (FLOVENT HFA) 220 MCG/ACT inhaler Inhale 1 puff into the lungs 2 times daily 1 Inhaler 3     ipratropium - albuterol 0.5 mg/2.5 mg/3 mL (DUONEB) 0.5-2.5 (3) MG/3ML neb solution Take 1 vial (3 mLs) by nebulization every 4 hours as needed for shortness of breath / dyspnea or wheezing 360 mL 0     losartan (COZAAR) 50 MG tablet Take 1 tablet (50 mg) by mouth daily 30 tablet 2     order for DME Equipment being ordered:Nebulizer  Treatment Diagnosis: COPD 1 Device 0     predniSONE (DELTASONE) 10 MG tablet Take 2 pills daily for 3 days , then one pill daily for three days , half a pill daily for two days. 10 tablet 0     predniSONE (DELTASONE) 20 MG tablet Take 40 mg by mouth daily for 5 days. 10 tablet 0     Allergies   Allergen Reactions     Ibuprofen      Other reaction(s): Other - Describe In Comment Field  Uri type of symptoms; \"watery eyes\". Gel caps are  OK  Eyes water,runny nose     Lisinopril Cough     Recent Labs   Lab Test 02/28/19  0730 10/09/18  1043 05/08/18  1132 03/22/17  1145 10/21/16  1201   A1C  --  6.1* 6.3* 6.1* 6.3*   LDL  --   --  55 55 53   HDL  --   --  40 49 37*   TRIG  --   --  132 119 89   ALT  --  66 68  --  63   CR 0.91 0.97 0.98  --  1.13   GFRESTIMATED >90 82 80  --  69   GFRESTBLACK >90 >90 >90  --  83   POTASSIUM 4.3 4.6 4.4  --  " "4.4      BP Readings from Last 3 Encounters:   03/05/19 123/75   02/28/19 160/74   10/09/18 131/84    Wt Readings from Last 3 Encounters:   03/05/19 128.5 kg (283 lb 3.2 oz)   10/09/18 127.5 kg (281 lb)   07/31/18 122.5 kg (270 lb)                  Labs reviewed in EPIC    Reviewed and updated as needed this visit by clinical staff       Reviewed and updated as needed this visit by Provider         ROS:  Constitutional, HEENT, cardiovascular, pulmonary, GI, , musculoskeletal, neuro, skin, endocrine and psych systems are negative, except as otherwise noted.    OBJECTIVE:     /75 (BP Location: Left arm, Patient Position: Sitting, Cuff Size: Adult Large)   Pulse 70   Temp 97.1  F (36.2  C) (Oral)   Resp 18   Ht 1.803 m (5' 11\")   Wt 128.5 kg (283 lb 3.2 oz)   SpO2 96%   BMI 39.50 kg/m    Body mass index is 39.5 kg/m .  GENERAL: healthy, alert and no distress  EYES: Eyes grossly normal to inspection, PERRL and conjunctivae and sclerae normal  HENT: ear canals and TM's normal, nose and mouth without ulcers or lesions  NECK: no adenopathy, no asymmetry, masses, or scars and thyroid normal to palpation  RESP: expiratory wheezes R lower posterior and L lower posterior  CV: regular rate and rhythm, normal S1 S2, no S3 or S4, no murmur, click or rub, no peripheral edema and peripheral pulses strong  ABDOMEN: soft, nontender, no hepatosplenomegaly, no masses and bowel sounds normal  MS: no gross musculoskeletal defects noted, no edema    Diagnostic Test Results:  No results found for this or any previous visit (from the past 24 hour(s)).    ASSESSMENT/PLAN:       1. Wheezing  Will refer to pulmonology  ,he would need spirometry , also start Flovent one puff BID , he will get off prednisone by tapering off , so I wrote the rx for the next 7 days taper. His Cxr done today was normal , no infiltrates   - PULMONARY MEDICINE REFERRAL  - XR Chest 2 Views; Future  - predniSONE (DELTASONE) 10 MG tablet; Take 2 pills " daily for 3 days , then one pill daily for three days , half a pill daily for two days.  Dispense: 10 tablet; Refill: 0  - fluticasone (FLOVENT HFA) 220 MCG/ACT inhaler; Inhale 1 puff into the lungs 2 times daily  Dispense: 1 Inhaler; Refill: 3    2. Morbid obesity (H)  When he start feeling better , will resume regular physical activities     3. Bronchitis  As above  - azithromycin (ZITHROMAX) 250 MG tablet; Take 2 tablets (500 mg) by mouth daily for 1 day, THEN 1 tablet (250 mg) daily for 4 days.  Dispense: 6 tablet; Refill: 0    RTC if no improving or worsening.  Pt is aware  and comfortable with the current plan.      Johana Tamez MD  [facility]

## 2019-03-06 ENCOUNTER — TELEPHONE (OUTPATIENT)
Dept: FAMILY MEDICINE | Facility: CLINIC | Age: 54
End: 2019-03-06

## 2019-03-06 DIAGNOSIS — R06.2 WHEEZING: Primary | ICD-10-CM

## 2019-03-06 NOTE — TELEPHONE ENCOUNTER
Reason for Call:  Other prescription    Detailed comments: patient got rx for flovent inhaler but it will cost $400 and he wants to know if there is something else that would be cheaper.     Phone Number Patient can be reached at: Home number on file [phone] (home)    Best Time: any    Can we leave a detailed message on this number? YES    Call taken on 3/6/2019 at 3:29 PM by Marjorie Beck

## 2019-03-07 RX ORDER — FLUTICASONE PROPIONATE 44 UG/1
1 AEROSOL, METERED RESPIRATORY (INHALATION) 2 TIMES DAILY
Qty: 1 INHALER | Refills: 3 | Status: SHIPPED | OUTPATIENT
Start: 2019-03-07 | End: 2021-08-24

## 2019-03-07 NOTE — TELEPHONE ENCOUNTER
I have sent for the lower dose Flovent , which should be cheaper - also tell him they should have it as fluticasone generic inhaler , not Flovent .  Thanks

## 2019-03-11 NOTE — TELEPHONE ENCOUNTER
RECORDS RECEIVED FROM: internal   DATE RECEIVED: 3.15.19   NOTES STATUS DETAILS   OFFICE NOTE from referring provider Internal 3.5.19 Dr. Tamez   OFFICE NOTE from other specialist N/A    DISCHARGE SUMMARY from hospital N/A 2.27.19   DISCHARGE REPORT from the ER Internal 2.27.19 2.2.18   OPERATIVE REPORT N/A    MEDICATION LIST Internal    IMAGING  (NEED IMAGES AND REPORTS)     CT SCAN N/A    CHEST XRAY (CXR) Internal 3.5.19  2.27.19   TESTS     PULMONARY FUNCTION TESTING (PFT) In process Scheduled   FLOW LOOP VOLUME (FVL) In process Scheduled   CYSTIC FIBROSIS     CF SPUTUM CULTURE N/A       Action    Action Taken 3.11.19 Requested 2.27.19 image from [facility].   3.14.19 Pulled image

## 2019-03-11 NOTE — TELEPHONE ENCOUNTER
Reason for Call:  Other prescription    Detailed comments: PT wants to know if Asmanex is the same as rx Flovent? If so, if he could take that prescription instead.    Please call him for updates.     Phone Number Patient can be reached at: Cell number on file:    Telephone Information:   Mobile [phone]       Best Time: any    Can we leave a detailed message on this number? YES    Call taken on 3/11/2019 at 10:23 AM by Sy Talley

## 2019-03-11 NOTE — TELEPHONE ENCOUNTER
LS,   Patient wondering if you can send in Asmanex  Lower dose Flovent you sent is still $200  States he has coupon for 4 months free of Asmanex if that is ok with you   Please advise  Thanks,  Erika DEAN RN

## 2019-03-13 ENCOUNTER — PATIENT OUTREACH (OUTPATIENT)
Dept: CARE COORDINATION | Facility: CLINIC | Age: 54
End: 2019-03-13

## 2019-03-13 ASSESSMENT — ACTIVITIES OF DAILY LIVING (ADL): DEPENDENT_IADLS:: INDEPENDENT

## 2019-03-13 NOTE — PROGRESS NOTES
Clinic Care Coordination Contact    Clinic Care Coordination Contact  OUTREACH    Referral Information:  Referral Source: IP Handoff    Primary Diagnosis: COPD    No chief complaint on file.       [place] Utilization:   Clinic Utilization  Difficulty keeping appointments:: No  Compliance Concerns: No  No-Show Concerns: No  Utilization    Last refreshed: 3/11/2019  4:53 PM:  Hospital Admissions 1           Last refreshed: 3/11/2019  4:53 PM:  ED Visits 0           Last refreshed: 3/11/2019  4:53 PM:  No Show Count (past year) 0              Current as of: 3/11/2019  4:53 PM              Clinical Concerns:  Current Medical Concerns:  Patient saw PCP 3/5/19  ASSESSMENT/PLAN:         1. Wheezing  Will refer to pulmonology  ,he would need spirometry , also start Flovent one puff BID , he will get off prednisone by tapering off , so I wrote the rx for the next 7 days taper. His Cxr done today was normal , no infiltrates   - PULMONARY MEDICINE REFERRAL  - XR Chest 2 Views; Future  - predniSONE (DELTASONE) 10 MG tablet; Take 2 pills daily for 3 days , then one pill daily for three days , half a pill daily for two days.  Dispense: 10 tablet; Refill: 0  - fluticasone (FLOVENT HFA) 220 MCG/ACT inhaler; Inhale 1 puff into the lungs 2 times daily  Dispense: 1 Inhaler; Refill: 3     2. Morbid obesity (H)  When he start feeling better , will resume regular physical activities      3. Bronchitis  As above  - azithromycin (ZITHROMAX) 250 MG tablet; Take 2 tablets (500 mg) by mouth daily for 1 day, THEN 1 tablet (250 mg) daily for 4 days.  Dispense: 6 tablet; Refill: 0     RTC if no improving or worsening.  Pt is aware  and comfortable with the current plan.            Clinic Care Coordinator RN reviewed chart.   Patient has Pulmonary appointment 3/15/19  Clinic Care Coordinator RN will follow up after appointment         Functional Status:  Dependent ADLs:: Independent  Dependent IADLs:: Independent  Bed or wheelchair confined::  No  Mobility Status: Independent           Transportation:  Transportation concerns (GOAL):: No  Transportation means:: Accessible car     Psychosocial:  Informal Support system:: Friends     Financial/Insurance:   Financial/Insurance concerns (GOAL):: Yes       Resources and Interventions:  Current Resources:    ;   Community Resources: ([facility] )  Supplies used at home:: Nebulizer tubing  Equipment Currently Used at Home: none    Advance Care Plan/Directive  Advanced Care Plans/Directives on file:: No    Referrals Placed: None     Goals:   Goals        General    Financial Wellbeing 2  (pt-stated)     Notes - Note edited  3/1/2019  1:50 PM by Katie Brooke RN    Goal 2  Statement: I will reach out to  if I need more help with hospital bill   Measure of Success: completed   Supportive Steps to Achieve: Clinic Care Coordinator RN outreach   Barriers: none  Strengths: desire to resolve issue.   Date to Achieve By: 5/1/19  Patient expressed understanding of goal: yes           Improve chronic symptoms 1 (pt-stated)     Notes - Note created  3/1/2019  1:59 PM by Katie Brooke RN    Goal 1  Statement: I will use my nebs and take medication as directed   Measure of Success: completed  Supportive Steps to Achieve: patient verbalizes understanding of directions   Barriers: none  Strengths: patient verbalizes understanding of directions   Date to Achieve By: 5/1/19  Patient expressed understanding of goal: yes                       Outreach Frequency: weekly  Future Appointments              In 2 days  PFL [facility] Pulmonary Function Testing, [facility]    In 2 days Umang Villafana MD [facility], [facility]          Plan: Patient has Pulmonary appointment 3/15/19  Clinic Care Coordinator RN will follow up after appointment

## 2019-03-15 ENCOUNTER — PRE VISIT (OUTPATIENT)
Dept: PULMONOLOGY | Facility: CLINIC | Age: 54
End: 2019-03-15

## 2019-03-15 ENCOUNTER — OFFICE VISIT (OUTPATIENT)
Dept: PULMONOLOGY | Facility: CLINIC | Age: 54
End: 2019-03-15
Attending: STUDENT IN AN ORGANIZED HEALTH CARE EDUCATION/TRAINING PROGRAM
Payer: COMMERCIAL

## 2019-03-15 VITALS
BODY MASS INDEX: 39.66 KG/M2 | DIASTOLIC BLOOD PRESSURE: 85 MMHG | RESPIRATION RATE: 16 BRPM | SYSTOLIC BLOOD PRESSURE: 137 MMHG | WEIGHT: 283.29 LBS | OXYGEN SATURATION: 96 % | HEART RATE: 77 BPM | HEIGHT: 71 IN

## 2019-03-15 DIAGNOSIS — J45.20 MILD INTERMITTENT ASTHMA WITHOUT COMPLICATION: ICD-10-CM

## 2019-03-15 DIAGNOSIS — R06.2 WHEEZING: ICD-10-CM

## 2019-03-15 DIAGNOSIS — R09.82 POST-NASAL DRIP: Primary | ICD-10-CM

## 2019-03-15 PROCEDURE — G0463 HOSPITAL OUTPT CLINIC VISIT: HCPCS | Mod: ZF

## 2019-03-15 RX ORDER — FLUTICASONE PROPIONATE 50 MCG
1 SPRAY, SUSPENSION (ML) NASAL DAILY
Qty: 18.2 ML | Refills: 1 | Status: SHIPPED | OUTPATIENT
Start: 2019-03-15 | End: 2019-07-22

## 2019-03-15 ASSESSMENT — PAIN SCALES - GENERAL: PAINLEVEL: NO PAIN (0)

## 2019-03-15 ASSESSMENT — MIFFLIN-ST. JEOR: SCORE: 2161.87

## 2019-03-15 NOTE — PROGRESS NOTES
Pulmonary Clinic Note   03/16/2019      PCP: Johana Tamez    Reason for visit: post hospital, COPD    Pulmonary HPI:   Martin Anguiano is a 53 year old male w/ h/o JOJO on CPAP, tobacco & marijuana use, HLD, HTN that presents for post hospital follow up.     The patient was recently hospitalized from 2/27-28 for an acute respiratory infection.  He reports returing from vacation in [place] about 1 month ago.  Shortly after returning he had a slight wheeze.  Then about 2 weeks later he developed an increasingly productive cough with green sputum and worsening wheezing.  No fevers, chills, sweats.  He presented to the ED and was hospitalized for 1 night.  He was treated with antibiotics and steroids for a possible COPD exacerbation given his smoking history.      Since hospitalization he has completed abx/steroid.  Unfortunately, due to cost he was not able to get an ICS until just 2 days ago.  He has continued to use albuterol nebs 3x daily but has been back to baseline for at least 2 days.      Prior to this, he had a cold last year that was associated with wheeze and he got an inhaler for this.  Other than that, no other hospitalizations or respiratory related issues.  Per his wife, he has a chronic cough for the last 2-3 years and he has regular sptum production.  The patient attributes this to congestions created by his CPAP machine.      Patient is a near daily smoker of marijuana, former cigarette smoker (1-1.5ppd for 22 years, then intermittent smoker for last 10 years); ~25-30 pack years history.   Patient has 3 dogs.   Patient lives in a home with his wife, no known mold.  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  He also worked for a time in [employer] and was exposed to powdered colors. Otherwise no other environmental exposures (mold, asbestos, fumes, etc).       Patient's outside records were reviewed via [facility] Everywhere &/or  available paper records (sent for scanning).    Review of systems: a complete 12-point ROS conducted, & found to be negative w/ exceptions as noted in the HPI.    Past medical history:  Past Medical History:   Diagnosis Date     HTN (hypertension)      Hypercholesteremia      JOJO (obstructive sleep apnea)      Pre-diabetes        Past Surgical History:   Procedure Laterality Date     NO HISTORY OF SURGERY         Social history:  Social History     Tobacco Use     Smoking status: Former Smoker     Packs/day: 1.00     Years: 20.00     Pack years: 20.00     Types: Cigarettes     Last attempt to quit: 10/4/2016     Years since quittin.4     Smokeless tobacco: Never Used     Tobacco comment: last 10 years were off and on, about 2016   Substance Use Topics     Alcohol use: Yes     Alcohol/week: 0.0 oz     Comment: 1-2 times per week, 2 drinks     Drug use: No       Family history:  Family History   Problem Relation Age of Onset     Diabetes Mother      Diabetes Brother      Hypertension Father      Hyperlipidemia Father      Coronary Artery Disease No family hx of      Cerebrovascular Disease No family hx of      Breast Cancer No family hx of      Colon Cancer No family hx of      Prostate Cancer No family hx of      Other Cancer No family hx of      Depression No family hx of      Anxiety Disorder No family hx of      Mental Illness No family hx of      Substance Abuse No family hx of      Anesthesia Reaction No family hx of      Asthma No family hx of      Osteoporosis No family hx of      Genetic Disorder No family hx of      Thyroid Disease No family hx of      Obesity No family hx of      Unknown/Adopted No family hx of    Mother, brother, and sister have asthma that was adult onset    Medications:  Current Outpatient Medications   Medication     albuterol (PROAIR HFA/PROVENTIL HFA/VENTOLIN HFA) 108 (90 Base) MCG/ACT inhaler     atorvastatin (LIPITOR) 40 MG tablet     fluticasone (FLONASE) 50 MCG/ACT nasal  "spray     ipratropium - albuterol 0.5 mg/2.5 mg/3 mL (DUONEB) 0.5-2.5 (3) MG/3ML neb solution     losartan (COZAAR) 50 MG tablet     mometasone (ASMANEX) 110 MCG/INH inhaler     order for DME     azithromycin (ZITHROMAX) 250 MG tablet     fluticasone (FLOVENT HFA) 220 MCG/ACT inhaler     fluticasone (FLOVENT HFA) 44 MCG/ACT inhaler     predniSONE (DELTASONE) 10 MG tablet     No current facility-administered medications for this visit.        Allergies:  Allergies   Allergen Reactions     Ibuprofen      Other reaction(s): Other - Describe In Comment Field  Uri type of symptoms; \"watery eyes\". Gel caps are  OK  Eyes water,runny nose     Lisinopril Cough       Physical examination:  /85 (BP Location: Right arm, Patient Position: Chair, Cuff Size: Adult Large)   Pulse 77   Resp 16   Ht 1.803 m (5' 10.98\")   Wt 128.5 kg (283 lb 4.7 oz)   SpO2 96%   BMI 39.53 kg/m      General: NAD  Eyes: Anicteric sclera, PERRL, EOMI  Nose: Nasal mucosa w/o edema or hyperemia; no nasal polyps  Mouth: MMM w/o lesions; no tonsillar enlargement; no oropharyngeal exudate, or erythema  Neck: No masses, no thyromegaly  Lymphatics: No significant cervical or supraclavicular LNs  CV: RRR, no m/c/r;   Lungs: CTAB no crackles or wheeze  Abd: Soft, NT, ND, obese  Ext: WWP, trace LE edema. no clubbing  Skin: No rashes, cyanosis, or jaundice  Neuro: Intact mentation w/ normal speech  Psych: Euthymic, normal affect, good eye contact    Labs: reviewed in [facility] & personally interpreted.   CBC  wnl    Imaging: reviewed in [facility] & personally interpreted. Below are the interpretations from the formal Radiology review.  CXR - no acute disease    PFT:  3/15/19 - reviewed.  No airflow obstruction.  Normal lung volumes and diffusion.       Impression & recommendations:    Martin was seen today for consult.    Diagnoses and all orders for this visit:    Wheezing  Mild intermittent asthma without complication  Post-nasal drip  Given patients symptoms to " the hospital and now normal PFTs his presentation is most consistent with asthma, not COPD.  He does also describe some chronic bronchitis sounding symptoms.  This may be related to smoking or post nasal drip.  Overall his symptoms are rather mild, we will continue the ICS for the month and then he can trial going off.  Albuterol PRN.  For the post nasal drip we will treat with an nasal steroid.  -     fluticasone (FLONASE) 50 MCG/ACT nasal spray; Spray 1 spray into both nostrils daily  -  Asthmanex ICS inhaler  - Albuterol PRN   - Recommended cessation of marijuana smoking    He does not qualify for lung cancer screening, age under 55 and borderline pack years.  His risk of cancer is very low as calculated today PLCO 2012 < 1% over 6 years.      RTC in 3 months    Patient was seen & discussed w/ Dr. Ean MD, who is in agreement.    Twan Villafana MD, MPH  Pulmonary & Critical Care, PGY-5  [phone]    I reviewed the patient's history, obtained additional history, conducted an examination to confirm key findings and reviewed the studies and labs. Discussed the case with Dr. Villafana and agree with the findings and plan as outlined in their note as written above.    Claudine Mckoy MD

## 2019-03-15 NOTE — PATIENT INSTRUCTIONS
Your symptoms are consistent with asthma  - Continue daily inhaler (Asthmanex), once this inhaler is done you can stop  - Use albuterol inhaler as needed    Start Flonase nasal spray for post nasal drip, hopefully this will help your cough    Follow up 3 months to check on the cough

## 2019-03-15 NOTE — LETTER
3/15/2019       RE: Martin Anguiano  [address] [place] [address]     Dear Colleague,    Thank you for referring your patient, Martin Anguiano, to the [facility] at [facility]. Please see a copy of my visit note below.    Pulmonary Clinic Note   03/16/2019      PCP: Johana Tamez    Reason for visit: post hospital, COPD    Pulmonary HPI:   Martin Anguiano is a 53 year old male w/ h/o JOJO on CPAP, tobacco & marijuana use, HLD, HTN that presents for post hospital follow up.     The patient was recently hospitalized from 2/27-28 for an acute respiratory infection.  He reports returing from vacation in [place] about 1 month ago.  Shortly after returning he had a slight wheeze.  Then about 2 weeks later he developed an increasingly productive cough with green sputum and worsening wheezing.  No fevers, chills, sweats.  He presented to the ED and was hospitalized for 1 night.  He was treated with antibiotics and steroids for a possible COPD exacerbation given his smoking history.      Since hospitalization he has completed abx/steroid.  Unfortunately, due to cost he was not able to get an ICS until just 2 days ago.  He has continued to use albuterol nebs 3x daily but has been back to baseline for at least 2 days.      Prior to this, he had a cold last year that was associated with wheeze and he got an inhaler for this.  Other than that, no other hospitalizations or respiratory related issues.  Per his wife, he has a chronic cough for the last 2-3 years and he has regular sptum production.  The patient attributes this to congestions created by his CPAP machine.      Patient is a near daily smoker of marijuana, former cigarette smoker (1-1.5ppd for 22 years, then intermittent smoker for last 10 years); ~25-30 pack years history.   Patient has 3 dogs.   Patient lives in a home with his wife, no known mold.  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  He also worked for a time in [employer] and was exposed to powdered colors. Otherwise no other environmental exposures (mold, asbestos, fumes, etc).       Patient's outside records were reviewed via [facility] Everywhere &/or available paper records (sent for scanning).    Review of systems: a complete 12-point ROS conducted, & found to be negative w/ exceptions as noted in the HPI.    Past medical history:  Past Medical History:   Diagnosis Date     HTN (hypertension)      Hypercholesteremia      JOJO (obstructive sleep apnea)      Pre-diabetes        Past Surgical History:   Procedure Laterality Date     NO HISTORY OF SURGERY         Social history:  Social History     Tobacco Use     Smoking status: Former Smoker     Packs/day: 1.00     Years: 20.00     Pack years: 20.00     Types: Cigarettes     Last attempt to quit: 10/4/2016     Years since quittin.4     Smokeless tobacco: Never Used     Tobacco comment: last 10 years were off and on, about 2016   Substance Use Topics     Alcohol use: Yes     Alcohol/week: 0.0 oz     Comment: 1-2 times per week, 2 drinks     Drug use: No       Family history:  Family History   Problem Relation Age of Onset     Diabetes Mother      Diabetes Brother      Hypertension Father      Hyperlipidemia Father      Coronary Artery Disease No family hx of      Cerebrovascular Disease No family hx of      Breast Cancer No family hx of      Colon Cancer No family hx of      Prostate Cancer No family hx of      Other Cancer No family hx of      Depression No family hx of      Anxiety Disorder No family hx of      Mental Illness No family hx of      Substance Abuse No family hx of      Anesthesia Reaction No family hx of      Asthma No family hx of      Osteoporosis No family hx of      Genetic Disorder No family hx of      Thyroid Disease No family hx of      Obesity No family hx of      Unknown/Adopted No family hx of   "  Mother, brother, and sister have asthma that was adult onset    Medications:  Current Outpatient Medications   Medication     albuterol (PROAIR HFA/PROVENTIL HFA/VENTOLIN HFA) 108 (90 Base) MCG/ACT inhaler     atorvastatin (LIPITOR) 40 MG tablet     fluticasone (FLONASE) 50 MCG/ACT nasal spray     ipratropium - albuterol 0.5 mg/2.5 mg/3 mL (DUONEB) 0.5-2.5 (3) MG/3ML neb solution     losartan (COZAAR) 50 MG tablet     mometasone (ASMANEX) 110 MCG/INH inhaler     order for DME     azithromycin (ZITHROMAX) 250 MG tablet     fluticasone (FLOVENT HFA) 220 MCG/ACT inhaler     fluticasone (FLOVENT HFA) 44 MCG/ACT inhaler     predniSONE (DELTASONE) 10 MG tablet     No current facility-administered medications for this visit.        Allergies:  Allergies   Allergen Reactions     Ibuprofen      Other reaction(s): Other - Describe In Comment Field  Uri type of symptoms; \"watery eyes\". Gel caps are  OK  Eyes water,runny nose     Lisinopril Cough       Physical examination:  /85 (BP Location: Right arm, Patient Position: Chair, Cuff Size: Adult Large)   Pulse 77   Resp 16   Ht 1.803 m (5' 10.98\")   Wt 128.5 kg (283 lb 4.7 oz)   SpO2 96%   BMI 39.53 kg/m       General: NAD  Eyes: Anicteric sclera, PERRL, EOMI  Nose: Nasal mucosa w/o edema or hyperemia; no nasal polyps  Mouth: MMM w/o lesions; no tonsillar enlargement; no oropharyngeal exudate, or erythema  Neck: No masses, no thyromegaly  Lymphatics: No significant cervical or supraclavicular LNs  CV: RRR, no m/c/r;   Lungs: CTAB no crackles or wheeze  Abd: Soft, NT, ND, obese  Ext: WWP, trace LE edema. no clubbing  Skin: No rashes, cyanosis, or jaundice  Neuro: Intact mentation w/ normal speech  Psych: Euthymic, normal affect, good eye contact    Labs: reviewed in [facility] & personally interpreted.   CBC  wnl    Imaging: reviewed in [facility] & personally interpreted. Below are the interpretations from the formal Radiology review.  CXR - no acute disease    PFT:  3/15/19 - " reviewed.  No airflow obstruction.  Normal lung volumes and diffusion.       Impression & recommendations:    Martin was seen today for consult.    Diagnoses and all orders for this visit:    Wheezing  Mild intermittent asthma without complication  Post-nasal drip  Given patients symptoms to the hospital and now normal PFTs his presentation is most consistent with asthma, not COPD.  He does also describe some chronic bronchitis sounding symptoms.  This may be related to smoking or post nasal drip.  Overall his symptoms are rather mild, we will continue the ICS for the month and then he can trial going off.  Albuterol PRN.  For the post nasal drip we will treat with an nasal steroid.  -     fluticasone (FLONASE) 50 MCG/ACT nasal spray; Spray 1 spray into both nostrils daily  -  Asthmanex ICS inhaler  - Albuterol PRN   - Recommended cessation of marijuana smoking    He does not qualify for lung cancer screening, age under 55 and borderline pack years.  His risk of cancer is very low as calculated today PLCO 2012 < 1% over 6 years.      RTC in 3 months    Patient was seen & discussed w/ Dr. Ean MD, who is in agreement.    Twan Villafana MD, MPH  Pulmonary & Critical Care, PGY-5  [phone]    I reviewed the patient's history, obtained additional history, conducted an examination to confirm key findings and reviewed the studies and labs. Discussed the case with Dr. Villafana and agree with the findings and plan as outlined in their note as written above.    Claudine Mckoy MD      Again, thank you for allowing me to participate in the care of your patient.      Sincerely,    Umang Villafana MD

## 2019-03-19 LAB
DLCOUNC-%PRED-PRE: 115 %
DLCOUNC-PRE: 34.72 ML/MIN/MMHG
DLCOUNC-PRED: 30.1 ML/MIN/MMHG
ERV-%PRED-PRE: 88 %
ERV-PRE: 0.72 L
ERV-PRED: 0.81 L
EXPTIME-PRE: 8.81 SEC
FEF2575-%PRED-PRE: 77 %
FEF2575-PRE: 2.79 L/SEC
FEF2575-PRED: 3.61 L/SEC
FEFMAX-%PRED-PRE: 75 %
FEFMAX-PRE: 7.6 L/SEC
FEFMAX-PRED: 10.01 L/SEC
FEV1-%PRED-PRE: 84 %
FEV1-PRE: 3.39 L
FEV1FEV6-PRE: 77 %
FEV1FEV6-PRED: 80 %
FEV1FVC-PRE: 75 %
FEV1FVC-PRED: 79 %
FEV1SVC-PRE: 72 %
FEV1SVC-PRED: 75 %
FIFMAX-PRE: 5.08 L/SEC
FRCPLETH-%PRED-PRE: 84 %
FRCPLETH-PRE: 3.03 L
FRCPLETH-PRED: 3.59 L
FVC-%PRED-PRE: 87 %
FVC-PRE: 4.49 L
FVC-PRED: 5.13 L
IC-%PRED-PRE: 87 %
IC-PRE: 3.98 L
IC-PRED: 4.56 L
RVPLETH-%PRED-PRE: 102 %
RVPLETH-PRE: 2.31 L
RVPLETH-PRED: 2.25 L
TLCPLETH-%PRED-PRE: 95 %
TLCPLETH-PRE: 7.01 L
TLCPLETH-PRED: 7.33 L
VA-%PRED-PRE: 88 %
VA-PRE: 6.08 L
VC-%PRED-PRE: 87 %
VC-PRE: 4.7 L
VC-PRED: 5.37 L

## 2019-04-10 ENCOUNTER — TELEPHONE (OUTPATIENT)
Dept: FAMILY MEDICINE | Facility: CLINIC | Age: 54
End: 2019-04-10

## 2019-04-10 NOTE — TELEPHONE ENCOUNTER
Prior Authorization Retail Medication Request    Medication/Dose: mometasone (ASMANEX) 110 MCG/INH inhaler  ICD code (if different than what is on RX):    Previously Tried and Failed:  Flovent  Rationale:      Insurance Name:  [employer] [phone]  Insurance ID:  [id number]      Pharmacy Information (if different than what is on RX)  Name:  [facility]   Phone:  [phone]

## 2019-04-17 ENCOUNTER — PATIENT OUTREACH (OUTPATIENT)
Dept: CARE COORDINATION | Facility: CLINIC | Age: 54
End: 2019-04-17

## 2019-04-17 NOTE — TELEPHONE ENCOUNTER
Prior Authorization Approval    Authorization Effective Date: 3/18/2019  Authorization Expiration Date: 4/16/2020  Medication: mometasone (ASMANEX) 110 MCG/INH inhaler- APPROVED  Approved Dose/Quantity:   Reference #:     Insurance Company: [employer] [phone] Fax [phone]  Expected CoPay:       CoPay Card Available:      Foundation Assistance Needed:    Which Pharmacy is filling the prescription (Not needed for infusion/clinic administered): [id number] [address] [facility] [address]  Pharmacy Notified: Yes  Patient Notified: Yes

## 2019-04-17 NOTE — TELEPHONE ENCOUNTER
PA Initiation    Medication: mometasone (ASMANEX) 110 MCG/INH inhaler- INITIATED  Insurance Company: [employer] - Phone [phone] Fax [phone]  Pharmacy Filling the Rx: [facility] [id number] [place] [address] [address] AT [facility] [address]  Filling Pharmacy Phone: [phone]  Filling Pharmacy Fax:    Start Date: 4/17/2019

## 2019-04-25 NOTE — PROGRESS NOTES
Clinic Care Coordination Contact  [facility]/Voicemail       Clinical Data: Care Coordinator Outreach  Outreach attempted.  Left message on voicemail with call back information and requested return call.  Plan:  Care Coordinator will attempt to contact patient in May if no return call from patient.

## 2019-05-09 ENCOUNTER — PATIENT OUTREACH (OUTPATIENT)
Dept: CARE COORDINATION | Facility: CLINIC | Age: 54
End: 2019-05-09

## 2019-05-09 NOTE — PROGRESS NOTES
Clinic Care Coordination Contact  [facility]/Voicemail       Clinical Data: Care Coordinator Outreach  Patient saw pulmonologist 3/15/19.   Outreach attempted .  Left message on voicemail with call back information and requested return call.  Plan:  Care Coordinator will try to reach patient again in 3-6 business days.

## 2019-05-15 ENCOUNTER — PATIENT OUTREACH (OUTPATIENT)
Dept: CARE COORDINATION | Facility: CLINIC | Age: 54
End: 2019-05-15

## 2019-05-21 NOTE — PROGRESS NOTES
Clinic Care Coordinator contact    Patient returned Clinic Care Coordinator RN call. Patient left a voicemail reporting he followed up with [facility]. Patient reports he was told he most likely has asthma not COPD.   Patient states he is doing well and using his inhaler.   Clinic Care Coordinator RN will attempt to contact patient next month.

## 2019-05-24 DIAGNOSIS — R05.9 COUGH: ICD-10-CM

## 2019-05-24 DIAGNOSIS — I10 BENIGN ESSENTIAL HYPERTENSION: ICD-10-CM

## 2019-05-28 RX ORDER — LOSARTAN POTASSIUM 50 MG/1
TABLET ORAL
Qty: 30 TABLET | Refills: 0 | Status: SHIPPED | OUTPATIENT
Start: 2019-05-28 | End: 2019-06-26

## 2019-05-28 RX ORDER — ALBUTEROL SULFATE 90 UG/1
AEROSOL, METERED RESPIRATORY (INHALATION)
Qty: 18 G | Refills: 0 | Status: SHIPPED | OUTPATIENT
Start: 2019-05-28 | End: 2019-06-24

## 2019-05-28 NOTE — TELEPHONE ENCOUNTER
Albuterol:   Routing refill request to provider for review/approval because:  Drug not on the [facility] [place] protocol for dx of cough    Losartan:  Routing refill request to provider for review/approval because:  A break in medication - should have run out in Jan 2019    Erika DEAN RN

## 2019-05-28 NOTE — TELEPHONE ENCOUNTER
"Losartan   Last Written Prescription Date:  10/21/2018  Last Fill Quantity: 30,  # refills: 2   Last office visit: 3/5/2019 with prescribing provider:  Yes    Future Office Visit:      Albuterol   Last Written Prescription Date:  02/27/2019  Last Fill Quantity: 18g,  # refills: 0   Last office visit: 3/5/2019 with prescribing provider:  Yes    Future Office Visit:      Requested Prescriptions   Pending Prescriptions Disp Refills     losartan (COZAAR) 50 MG tablet [Pharmacy Med Name: LOSARTAN 50MG TABLETS] 30 tablet 0     Sig: TAKE 1 TABLET(50 MG) BY MOUTH DAILY       Angiotensin-II Receptors Passed - 5/24/2019  3:32 PM        Passed - Blood pressure under 140/90 in past 12 months     BP Readings from Last 3 Encounters:   03/15/19 137/85   03/05/19 123/75   02/28/19 160/74                 Passed - Recent (12 mo) or future (30 days) visit within the authorizing provider's specialty     Patient had office visit in the last 12 months or has a visit in the next 30 days with authorizing provider or within the authorizing provider's specialty.  See \"Patient Info\" tab in inbasket, or \"Choose Columns\" in Meds & Orders section of the refill encounter.              Passed - Medication is active on med list        Passed - Patient is age 18 or older        Passed - Normal serum creatinine on file in past 12 months     Recent Labs   Lab Test 02/28/19  0730   CR 0.91             Passed - Normal serum potassium on file in past 12 months     Recent Labs   Lab Test 02/28/19  0730   POTASSIUM 4.3                    albuterol (PROAIR HFA/PROVENTIL HFA/VENTOLIN HFA) 108 (90 Base) MCG/ACT inhaler [Pharmacy Med Name: ALBUTEROL HFA INH (200 PUFFS) 18GM] 18 g 0     Sig: INHALE 2 PUFFS INTO THE LUNGS EVERY 6 HOURS AS NEEDED FOR SHORTNESS OF BREATH OR DIFFICULT BREATHING OR WHEEZING       Asthma Maintenance Inhalers - Anticholinergics Passed - 5/24/2019  3:32 PM        Passed - Patient is age 12 years or older        Passed - Recent (12 mo) " "or future (30 days) visit within the authorizing provider's specialty     Patient had office visit in the last 12 months or has a visit in the next 30 days with authorizing provider or within the authorizing provider's specialty.  See \"Patient Info\" tab in inbasket, or \"Choose Columns\" in Meds & Orders section of the refill encounter.              Passed - Medication is active on med list          "

## 2019-06-03 DIAGNOSIS — E78.00 HYPERCHOLESTEROLEMIA: ICD-10-CM

## 2019-06-03 DIAGNOSIS — Z00.00 ENCOUNTER FOR ROUTINE ADULT HEALTH EXAMINATION WITHOUT ABNORMAL FINDINGS: ICD-10-CM

## 2019-06-04 RX ORDER — ATORVASTATIN CALCIUM 40 MG/1
TABLET, FILM COATED ORAL
Qty: 30 TABLET | Refills: 0 | Status: SHIPPED | OUTPATIENT
Start: 2019-06-04 | End: 2019-07-07

## 2019-06-04 NOTE — TELEPHONE ENCOUNTER
"Last Written Prescription Date:  11/15/2018  Last Fill Quantity: 90,  # refills: 1   Last office visit: 3/5/2019 with prescribing provider:     Future Office Visit:  Requested Prescriptions   Pending Prescriptions Disp Refills     atorvastatin (LIPITOR) 40 MG tablet [Pharmacy Med Name: ATORVASTATIN 40MG TABLETS] 90 tablet 0     Sig: TAKE 1 TABLET(40 MG) BY MOUTH DAILY       Statins Protocol Failed - 6/3/2019  3:45 PM        Failed - LDL on file in past 12 months     Recent Labs   Lab Test 05/08/18  1132   LDL 55             Passed - No abnormal creatine kinase in past 12 months     No lab results found.             Passed - Recent (12 mo) or future (30 days) visit within the authorizing provider's specialty     Patient had office visit in the last 12 months or has a visit in the next 30 days with authorizing provider or within the authorizing provider's specialty.  See \"Patient Info\" tab in inbasket, or \"Choose Columns\" in Meds & Orders section of the refill encounter.              Passed - Medication is active on med list        Passed - Patient is age 18 or older        "

## 2019-06-11 ENCOUNTER — PATIENT OUTREACH (OUTPATIENT)
Dept: CARE COORDINATION | Facility: CLINIC | Age: 54
End: 2019-06-11

## 2019-06-18 ENCOUNTER — PATIENT OUTREACH (OUTPATIENT)
Dept: CARE COORDINATION | Facility: CLINIC | Age: 54
End: 2019-06-18

## 2019-06-18 ASSESSMENT — ACTIVITIES OF DAILY LIVING (ADL): DEPENDENT_IADLS:: INDEPENDENT

## 2019-06-18 NOTE — LETTER
[facility]  Complex Care Plan  About Me:    Patient Name:  Martin Anguiano    YOB: 1965  Age:         54 year old   [place] MRN:    [id number] Telephone Information:  Home Phone [phone]   Mobile [phone]       Address:  [address]   [place] [address] Email address:  [email or web address]      Emergency Contact(s)    Name Relationship Lgl Grd Work Phone Home Phone Mobile Phone   1. MANJINDER GALICIA Friend No  [phone]            Primary language:  English     needed? No   [place] [facility]:  [phone] op. 1  Other communication barriers: None  Preferred Method of Communication:  Mail  Current living arrangement:    Mobility Status/ Medical Equipment: Independent    Health Maintenance  Health Maintenance Reviewed:      My Access Plan  Medical Emergency 911   Primary Clinic Line [facility] - [phone]   24 Hour Appointment Line [phone] or  [phone]) (toll-free)   24 Hour Nurse Line [phone] (toll-free)   Preferred Urgent Care     Preferred Hospital [facility]  [phone]   Preferred Pharmacy [id number] [place] [address] AT [facility] [address]     Behavioral Health Crisis Line The National Suicide Prevention Lifeline at 1-337.131.1812 or 911             My Care Team Members  Patient Care Team       Relationship Specialty Notifications Start End    Johana Tamez MD PCP - General Family Practice  8/5/16     Phone: [phone] Fax: [phone] [address] [address]    Katie Brooke, RN Lead Care Coordinator Primary Care - CC Admissions 3/1/19     Phone: [phone] Fax: [phone]        Umang Villafana MD Resident House Physician  3/5/19     Phone: [phone] Fax: [phone]         [address] [facility] [address]    Johana Tamez MD Assigned PCP   4/26/19     Phone:  [phone] Fax: [phone] [address] [facility] [address] [address]            My Care Plans  Self Management and Treatment Plan  Goals and (Comments)  Goals        General    Improve chronic symptoms 1 (pt-stated)     Notes - Note created  3/1/2019  1:59 PM by Katie Brooke, RN    Goal 1  Statement: I will use my nebs and take medication as directed   Measure of Success: completed  Supportive Steps to Achieve: patient verbalizes understanding of directions   Barriers: none  Strengths: patient verbalizes understanding of directions   Date to Achieve By: 5/1/19  Patient expressed understanding of goal: yes                  Action Plans on File:                       Advance Care Plans/Directives Type:        My Medical and Care Information  Problem List   Patient Active Problem List   Diagnosis     Benign essential hypertension     JOJO (obstructive sleep apnea)     Family history of diabetes mellitus     Glucose intolerance (impaired glucose tolerance)     Non morbid obesity due to excess calories     Screening for prostate cancer     Mixed hyperlipidemia     Tobacco use disorder 19-48y/o @ 1.5ppd for 15 yrs then 1ppd=31-32 pk yr hx     Chronic coronary artery disease     Impotence of organic origin     Family history of coronary artery disease     Gastroesophageal reflux disease     History of vasectomy     Hypercholesterolemia     Vitamin D deficiency     Cellulitis and abscess of leg-healing and closing      Obesity (BMI 35.0-39.9) with comorbidity (H)      Current Medications and Allergies:  See printed Medication Report.    Care Coordination Start Date: No linked episodes   Frequency of Care Coordination: monthly   Form Last Updated: 06/18/2019

## 2019-06-18 NOTE — PROGRESS NOTES
"Clinic Care Coordination Contact    Clinic Care Coordination Contact  OUTREACH    Referral Information:  Referral Source: IP Report    Primary Diagnosis: Respiratory Disorders - other         Universal Utilization:   Clinic Utilization  Difficulty keeping appointments:: No  Compliance Concerns: No  No-Show Concerns: No  No PCP office visit in Past Year: No  Utilization    Last refreshed: 6/17/2019  2:50 AM:  Hospital Admissions 1           Last refreshed: 6/17/2019  2:50 AM:  ED Visits 0           Last refreshed: 6/17/2019  2:50 AM:  No Show Count (past year) 0              Current as of: 6/17/2019  2:50 AM              Clinical Concerns:  Current Medical Concerns:  Clinic Care Coordinator RN followed up with patient.   Patient reports he is doing well.   Patient reports he is out of \"nasel spray\"  Patient was rx'd flonase during hospitalization.  Clinic Care Coordinator RN will route to PCP. Clinic Care Coordinator RN also advised patient that Flonase is available over the counter.   Patient reports he is smoking cigarettes - 1-2 several times a week.  Patient ould like to restart Chantix.  Clinic Care Coordinator RN will route to PCP.  Patient uses [facility] in [facility].  Patient is agreeable to Quit Plan referral.  Referral was placed.   Patient agreed to follow up by Clinic Care Coordinator RN next month.           Pain  Pain (GOAL):: No  Health Maintenance Reviewed:      Medication Management:  See above      Functional Status:  Dependent ADLs:: Independent  Dependent IADLs:: Independent  Bed or wheelchair confined:: No  Mobility Status: Independent  Fallen 2 or more times in the past year?: No  Any fall with injury in the past year?: No    Living Situation:       Diet/Exercise/Sleep:       Transportation:  Transportation concerns (GOAL):: No  Transportation means:: Accessible car     Psychosocial:        Financial/Insurance:   Financial/Insurance concerns (GOAL):: No       Resources and Interventions:  Current " Resources:    ;              Advance Care Plan/Directive  Advanced Care Plans/Directives on file:: No          Goals:   Goals        General    Improve chronic symptoms 1 (pt-stated)     Notes - Note created  3/1/2019  1:59 PM by Katie Brooke RN    Goal 1  Statement: I will use my nebs and take medication as directed   Measure of Success: completed  Supportive Steps to Achieve: patient verbalizes understanding of directions   Barriers: none  Strengths: patient verbalizes understanding of directions   Date to Achieve By: 5/1/19  Patient expressed understanding of goal: yes                   Patient/Caregiver understanding: yes    Outreach Frequency: monthly      Plan: Clinic Care Coordinator RN will route to PCP for refills.    Patient is agreeable to Quit Plan referral.  Referral was placed.   Patient agreed to follow up by Clinic Care Coordinator RN next month.

## 2019-06-24 ENCOUNTER — PATIENT OUTREACH (OUTPATIENT)
Dept: CARE COORDINATION | Facility: CLINIC | Age: 54
End: 2019-06-24

## 2019-06-24 DIAGNOSIS — R05.9 COUGH: ICD-10-CM

## 2019-06-24 DIAGNOSIS — R09.82 POST-NASAL DRIP: ICD-10-CM

## 2019-06-24 DIAGNOSIS — Z71.6 ENCOUNTER FOR SMOKING CESSATION COUNSELING: ICD-10-CM

## 2019-06-24 RX ORDER — ALBUTEROL SULFATE 90 UG/1
AEROSOL, METERED RESPIRATORY (INHALATION)
Qty: 18 G | Refills: 0 | Status: SHIPPED | OUTPATIENT
Start: 2019-06-24 | End: 2019-11-26

## 2019-06-24 ASSESSMENT — ACTIVITIES OF DAILY LIVING (ADL): DEPENDENT_IADLS:: INDEPENDENT

## 2019-06-24 NOTE — PROGRESS NOTES
Clinic Care Coordination Contact  Care Team Conversations    Clinic Care Coordinator RN asked triage to send refill requests for Flonase and Chantix to PCP.   Patient requested refills during conversation with Clinic Care Coordinator RN - see encounter for more detail.

## 2019-06-24 NOTE — TELEPHONE ENCOUNTER
"Routing refill request to provider for review/approval because:  Drug not on the [facility] refill protocol - albuterol for cough  Pt is wanting to restart chantix after starting smoking per care coordinator.  Please authorize if appropriate.  Thanks,  Jocelin Mckeon RN    Spoke to pharm and pt has a refill left on the Flonase.          Requested Prescriptions   Pending Prescriptions Disp Refills     albuterol (PROAIR HFA/PROVENTIL HFA/VENTOLIN HFA) 108 (90 Base) MCG/ACT inhaler [Pharmacy Med Name: ALBUTEROL HFA INH (200 PUFFS) 18GM] 18 g 0     Sig: INHALE 2 PUFFS INTO THE LUNGS EVERY 6 HOURS AS NEEDED FOR SHORTNESS OF BREATH OR DIFFICULT BREATHING OR WHEEZING       Asthma Maintenance Inhalers - Anticholinergics Passed - 6/24/2019  8:30 AM        Passed - Patient is age 12 years or older        Passed - Recent (12 mo) or future (30 days) visit within the authorizing provider's specialty     Patient had office visit in the last 12 months or has a visit in the next 30 days with authorizing provider or within the authorizing provider's specialty.  See \"Patient Info\" tab in inbasket, or \"Choose Columns\" in Meds & Orders section of the refill encounter.              Passed - Medication is active on med list        fluticasone (FLONASE) 50 MCG/ACT nasal spray 18.2 mL 1     Sig: Spray 1 spray into both nostrils daily       There is no refill protocol information for this order        varenicline (CHANTIX STARTING MONTH PAK) 0.5 MG X 11 & 1 MG X 42 tablet 53 tablet 0     Sig: Take 0.5 mg tab daily for 3 days, then 0.5 mg tab twice daily for 4 days, then 1 mg twice daily.       There is no refill protocol information for this order          "

## 2019-06-26 DIAGNOSIS — I10 BENIGN ESSENTIAL HYPERTENSION: ICD-10-CM

## 2019-06-27 RX ORDER — LOSARTAN POTASSIUM 50 MG/1
TABLET ORAL
Qty: 90 TABLET | Refills: 0 | Status: SHIPPED | OUTPATIENT
Start: 2019-06-27 | End: 2019-09-28

## 2019-06-27 NOTE — TELEPHONE ENCOUNTER
"Prescription approved per [facility] Refill Protocol.Requested Prescriptions   Pending Prescriptions Disp Refills     losartan (COZAAR) 50 MG tablet [Pharmacy Med Name: LOSARTAN 50MG TABLETS] 30 tablet 0     Sig: TAKE 1 TABLET(50 MG) BY MOUTH DAILY       Angiotensin-II Receptors Passed - 6/26/2019  8:13 PM        Passed - Blood pressure under 140/90 in past 12 months     BP Readings from Last 3 Encounters:   03/15/19 137/85   03/05/19 123/75   02/28/19 160/74                 Passed - Recent (12 mo) or future (30 days) visit within the authorizing provider's specialty     Patient had office visit in the last 12 months or has a visit in the next 30 days with authorizing provider or within the authorizing provider's specialty.  See \"Patient Info\" tab in inbasket, or \"Choose Columns\" in Meds & Orders section of the refill encounter.              Passed - Medication is active on med list        Passed - Patient is age 18 or older        Passed - Normal serum creatinine on file in past 12 months     Recent Labs   Lab Test 02/28/19  0730   CR 0.91             Passed - Normal serum potassium on file in past 12 months     Recent Labs   Lab Test 02/28/19  0730   POTASSIUM 4.3                    "

## 2019-07-07 DIAGNOSIS — J44.1 COPD WITH ACUTE EXACERBATION (H): ICD-10-CM

## 2019-07-07 DIAGNOSIS — Z00.00 ENCOUNTER FOR ROUTINE ADULT HEALTH EXAMINATION WITHOUT ABNORMAL FINDINGS: ICD-10-CM

## 2019-07-07 DIAGNOSIS — E78.00 HYPERCHOLESTEROLEMIA: ICD-10-CM

## 2019-07-07 DIAGNOSIS — R06.2 WHEEZING: ICD-10-CM

## 2019-07-08 NOTE — TELEPHONE ENCOUNTER
"One month given 6/4/19.  Due for Lipids. Last done 5/8/18  Physical done 10/9/18    [facility] message sent to patient asking him to schedule lab appointment.  Christina Guo RN        Requested Prescriptions   Pending Prescriptions Disp Refills     atorvastatin (LIPITOR) 40 MG tablet [Pharmacy Med Name: ATORVASTATIN 40MG TABLETS] 30 tablet 0     Sig: TAKE 1 TABLET BY MOUTH ONCE DAILY. NEEDS TO BE SEEN FOR MORE REFILLS       Statins Protocol Failed - 7/7/2019  8:23 AM        Failed - LDL on file in past 12 months     Recent Labs   Lab Test 05/08/18  1132   LDL 55             Passed - No abnormal creatine kinase in past 12 months     No lab results found.             Passed - Recent (12 mo) or future (30 days) visit within the authorizing provider's specialty     Patient had office visit in the last 12 months or has a visit in the next 30 days with authorizing provider or within the authorizing provider's specialty.  See \"Patient Info\" tab in inbasket, or \"Choose Columns\" in Meds & Orders section of the refill encounter.              Passed - Medication is active on med list        Passed - Patient is age 18 or older          "

## 2019-07-09 RX ORDER — ATORVASTATIN CALCIUM 40 MG/1
40 TABLET, FILM COATED ORAL DAILY
Qty: 30 TABLET | Refills: 0 | Status: SHIPPED | OUTPATIENT
Start: 2019-07-09 | End: 2019-08-09

## 2019-07-09 NOTE — TELEPHONE ENCOUNTER
Patient called and said he is out of this Medication, he scheduled an appointment  For Friday Aug 2nd    Thank you

## 2019-07-09 NOTE — TELEPHONE ENCOUNTER
"A.S.  Please advise in LS\"s absence  Pt requesting refills on nebulizer and prednisone, both were rx'd in the hospital. Says that humid weather is exacerbating COPD, would like to have meds on hand.    Please approve if appropriate  Gwen Das RN            Refilled atorvastatin for one month. Pt coming in for lab only appt beginning of August  "

## 2019-07-11 RX ORDER — IPRATROPIUM BROMIDE AND ALBUTEROL SULFATE 2.5; .5 MG/3ML; MG/3ML
1 SOLUTION RESPIRATORY (INHALATION) EVERY 4 HOURS PRN
Qty: 360 ML | Refills: 0 | Status: SHIPPED | OUTPATIENT
Start: 2019-07-11 | End: 2019-11-26

## 2019-07-11 RX ORDER — PREDNISONE 10 MG/1
TABLET ORAL
Qty: 10 TABLET | Refills: 0 | Status: SHIPPED | OUTPATIENT
Start: 2019-07-11 | End: 2021-08-24

## 2019-07-22 DIAGNOSIS — I10 BENIGN ESSENTIAL HYPERTENSION: ICD-10-CM

## 2019-07-22 DIAGNOSIS — R09.82 POST-NASAL DRIP: ICD-10-CM

## 2019-07-22 RX ORDER — FLUTICASONE PROPIONATE 50 MCG
1 SPRAY, SUSPENSION (ML) NASAL DAILY
Qty: 18.2 ML | Refills: 3 | Status: SHIPPED | OUTPATIENT
Start: 2019-07-22 | End: 2020-07-08

## 2019-07-22 NOTE — TELEPHONE ENCOUNTER
"Losartan 50MG  Last Written Prescription Date:  06/27/2019  Last Fill Quantity: 90,  # refills: 0   Last office visit: 3/5/2019 with prescribing provider:  Yes    Future Office Visit:    Requested Prescriptions   Pending Prescriptions Disp Refills     losartan (COZAAR) 50 MG tablet [Pharmacy Med Name: LOSARTAN 50MG TABLETS] 30 tablet 0     Sig: TAKE 1 TABLET(50 MG) BY MOUTH DAILY       Angiotensin-II Receptors Passed - 7/22/2019 11:02 AM        Passed - Blood pressure under 140/90 in past 12 months     BP Readings from Last 3 Encounters:   03/15/19 137/85   03/05/19 123/75   02/28/19 160/74                 Passed - Recent (12 mo) or future (30 days) visit within the authorizing provider's specialty     Patient had office visit in the last 12 months or has a visit in the next 30 days with authorizing provider or within the authorizing provider's specialty.  See \"Patient Info\" tab in inbasket, or \"Choose Columns\" in Meds & Orders section of the refill encounter.              Passed - Medication is active on med list        Passed - Patient is age 18 or older        Passed - Normal serum creatinine on file in past 12 months     Recent Labs   Lab Test 02/28/19  0730   CR 0.91             Passed - Normal serum potassium on file in past 12 months     Recent Labs   Lab Test 02/28/19  0730   POTASSIUM 4.3                      "

## 2019-07-23 ENCOUNTER — PATIENT OUTREACH (OUTPATIENT)
Dept: CARE COORDINATION | Facility: CLINIC | Age: 54
End: 2019-07-23

## 2019-07-23 DIAGNOSIS — Z71.6 ENCOUNTER FOR SMOKING CESSATION COUNSELING: Primary | ICD-10-CM

## 2019-07-23 DIAGNOSIS — Z71.6 ENCOUNTER FOR SMOKING CESSATION COUNSELING: ICD-10-CM

## 2019-07-23 NOTE — TELEPHONE ENCOUNTER
Assessment: Clinic Care Coordinator RN spoke with patient today about the following issues:     2. Patient needs a refill of Chantix.   Patient has not smoked cigarettes or marijuana since restarting Chantix in June.   Clinic Care Coordinator RN educated patient on tabacco cessation.

## 2019-07-24 RX ORDER — LOSARTAN POTASSIUM 50 MG/1
TABLET ORAL
Start: 2019-07-24

## 2019-07-24 RX ORDER — VARENICLINE TARTRATE 1 MG/1
1 TABLET, FILM COATED ORAL 2 TIMES DAILY
Qty: 60 TABLET | Refills: 1 | OUTPATIENT
Start: 2019-07-24

## 2019-07-24 NOTE — TELEPHONE ENCOUNTER
LS  See below message from CC. Does have refill for starter pack but does he need to get new Rx for continuation dose of 1 mg BID?    Pended, Please approve if appropriate  Gwen Das RN

## 2019-08-09 ENCOUNTER — MYC MEDICAL ADVICE (OUTPATIENT)
Dept: FAMILY MEDICINE | Facility: CLINIC | Age: 54
End: 2019-08-09

## 2019-08-09 DIAGNOSIS — Z00.00 ENCOUNTER FOR ROUTINE ADULT HEALTH EXAMINATION WITHOUT ABNORMAL FINDINGS: ICD-10-CM

## 2019-08-09 DIAGNOSIS — E78.00 HYPERCHOLESTEROLEMIA: ICD-10-CM

## 2019-08-09 NOTE — TELEPHONE ENCOUNTER
"ATORVASTATIN 40MG TABLETS  Last Written Prescription Date:  07/09/2019  Last Fill Quantity: 30,  # refills: 0   Last office visit: 3/5/2019 with prescribing provider:  PABLO   Future Office Visit:Nothing at this time scheduled.    Requested Prescriptions   Pending Prescriptions Disp Refills     atorvastatin (LIPITOR) 40 MG tablet [Pharmacy Med Name: ATORVASTATIN 40MG TABLETS] 30 tablet 0     Sig: TAKE 1 TABLET(40 MG) BY MOUTH DAILY       Statins Protocol Failed - 8/9/2019  8:54 AM        Failed - LDL on file in past 12 months     Recent Labs   Lab Test 05/08/18  1132   LDL 55             Passed - No abnormal creatine kinase in past 12 months     No lab results found.             Passed - Recent (12 mo) or future (30 days) visit within the authorizing provider's specialty     Patient had office visit in the last 12 months or has a visit in the next 30 days with authorizing provider or within the authorizing provider's specialty.  See \"Patient Info\" tab in inbasket, or \"Choose Columns\" in Meds & Orders section of the refill encounter.              Passed - Medication is active on med list        Passed - Patient is age 18 or older        "

## 2019-08-12 RX ORDER — ATORVASTATIN CALCIUM 40 MG/1
TABLET, FILM COATED ORAL
Qty: 30 TABLET | Refills: 0 | Status: SHIPPED | OUTPATIENT
Start: 2019-08-12 | End: 2019-09-07

## 2019-08-19 DIAGNOSIS — E78.00 HYPERCHOLESTEROLEMIA: ICD-10-CM

## 2019-08-19 LAB
CHOLEST SERPL-MCNC: 137 MG/DL
HDLC SERPL-MCNC: 48 MG/DL
LDLC SERPL CALC-MCNC: 73 MG/DL
NONHDLC SERPL-MCNC: 89 MG/DL
TRIGL SERPL-MCNC: 82 MG/DL

## 2019-08-19 PROCEDURE — 36415 COLL VENOUS BLD VENIPUNCTURE: CPT | Performed by: FAMILY MEDICINE

## 2019-08-19 PROCEDURE — 80061 LIPID PANEL: CPT | Performed by: FAMILY MEDICINE

## 2019-09-07 DIAGNOSIS — Z00.00 ENCOUNTER FOR ROUTINE ADULT HEALTH EXAMINATION WITHOUT ABNORMAL FINDINGS: ICD-10-CM

## 2019-09-07 DIAGNOSIS — E78.00 HYPERCHOLESTEROLEMIA: ICD-10-CM

## 2019-09-09 RX ORDER — ATORVASTATIN CALCIUM 40 MG/1
TABLET, FILM COATED ORAL
Qty: 90 TABLET | Refills: 1 | Status: SHIPPED | OUTPATIENT
Start: 2019-09-09 | End: 2019-11-26

## 2019-09-09 NOTE — TELEPHONE ENCOUNTER
"Prescription approved per [facility] Refill Protocol.  Erika DEAN RN    Last Written Prescription Date:  8/12/2019  Last Fill Quantity: 30,  # refills: 0   Last office visit: 3/5/2019 with prescribing provider:     Future Office Visit:    Requested Prescriptions   Pending Prescriptions Disp Refills     atorvastatin (LIPITOR) 40 MG tablet [Pharmacy Med Name: ATORVASTATIN 40MG TABLETS] 30 tablet 0     Sig: TAKE 1 TABLET(40 MG) BY MOUTH DAILY       Statins Protocol Passed - 9/7/2019  1:40 PM        Passed - LDL on file in past 12 months     Recent Labs   Lab Test 08/19/19  0935   LDL 73             Passed - No abnormal creatine kinase in past 12 months     No lab results found.             Passed - Recent (12 mo) or future (30 days) visit within the authorizing provider's specialty     Patient had office visit in the last 12 months or has a visit in the next 30 days with authorizing provider or within the authorizing provider's specialty.  See \"Patient Info\" tab in inbasket, or \"Choose Columns\" in Meds & Orders section of the refill encounter.              Passed - Medication is active on med list        Passed - Patient is age 18 or older        "

## 2019-09-16 ENCOUNTER — PATIENT OUTREACH (OUTPATIENT)
Dept: CARE COORDINATION | Facility: CLINIC | Age: 54
End: 2019-09-16

## 2019-09-16 DIAGNOSIS — Z71.6 ENCOUNTER FOR SMOKING CESSATION COUNSELING: Primary | ICD-10-CM

## 2019-09-16 NOTE — LETTER
[facility]  Complex Care Plan  About Me:    Patient Name:  Martin Anguiano    YOB: 1965  Age:         54 year old   [place] MRN:    [id number] Telephone Information:  Home Phone [phone]   Mobile [phone]       Address:  [address] [place] [address] Email address:  [email or web address]      Emergency Contact(s)    Name Relationship Lgl Grd Work Phone Home Phone Mobile Phone   1. MANJINDER GALICIA Friend No  [phone]            Primary language:  English     needed? No   [place] [employer] [facility]:  [phone] op. 1  Other communication barriers:    Preferred Method of Communication:  Mail  Current living arrangement:    Mobility Status/ Medical Equipment:      Health Maintenance  Health Maintenance Reviewed:      My Access Plan  Medical Emergency 911   [facility] [phone]   24 Hour Appointment Line [phone] or  [phone]) (toll-free)   24 Hour Nurse Line [phone] (toll-free)   Preferred Urgent Care     Preferred Hospital     Preferred Pharmacy [id number] - [place] - [address] AT [facility] [address]     Behavioral Health Crisis Line The National Suicide Prevention Lifeline at 1-238.483.6366 or 911             My Care Team Members  Patient Care Team       Relationship Specialty Notifications Start End    Johana Tamez MD PCP - General Family Practice  8/5/16     Phone: [phone] Fax: [phone]         [facility] [address]    Johana Tamez MD Assigned PCP   2/13/16     Phone: [phone] Fax: [phone]         [facility] [address]    Katie Brooke, RN Lead Care Coordinator Primary Care - CC Admissions 3/1/19     Phone: [phone] Fax: [phone]        Umang Villafana MD Resident House Physician  3/5/19     Phone: [phone] Fax: [phone] [address]  [facility] [address]            My Care Plans  Self Management and Treatment Plan  Goals and (Comments)  Goals        General    Improve chronic symptoms 1 (pt-stated)     Notes - Note created  3/1/2019  1:59 PM by Katie Brooke, RN    Goal 1  Statement: I will use my nebs and take medication as directed   Measure of Success: completed  Supportive Steps to Achieve: patient verbalizes understanding of directions   Barriers: none  Strengths: patient verbalizes understanding of directions   Date to Achieve By: 5/1/19  Patient expressed understanding of goal: yes                  Action Plans on File:                       Advance Care Plans/Directives Type:        My Medical and Care Information  Problem List   Patient Active Problem List   Diagnosis     Benign essential hypertension     JOJO (obstructive sleep apnea)     Family history of diabetes mellitus     Glucose intolerance (impaired glucose tolerance)     Non morbid obesity due to excess calories     Screening for prostate cancer     Mixed hyperlipidemia     Tobacco use disorder 19-46y/o @ 1.5ppd for 15 yrs then 1ppd=31-32 pk yr hx     Chronic coronary artery disease     Impotence of organic origin     Family history of coronary artery disease     Gastroesophageal reflux disease     History of vasectomy     Hypercholesterolemia     Vitamin D deficiency     Cellulitis and abscess of leg-healing and closing      Obesity (BMI 35.0-39.9) with comorbidity (H)      Current Medications and Allergies:  See printed Medication Report.    Care Coordination Start Date: No linked episodes   Frequency of Care Coordination:     Form Last Updated: 09/16/2019

## 2019-09-16 NOTE — PROGRESS NOTES
Clinic Care Coordination Contact    Follow Up Progress Note      Assessment: Clinic Care Coordinator RN spoke with patient today.  Patient reports he is feel good.   Patient denies issues with breathing.  Patient is using his inhalers as directed.   Patient has been taking Chantix.  Patient has not smoked since he has been on medication.  Patient request a refill.  Clinic Care Coordinator RN routed to PCP for refill.     Goals addressed this encounter:   Goals Addressed                 This Visit's Progress      Improve chronic symptoms 1 (pt-stated)   On track     Goal 1  Statement: I will use my nebs and take medication as directed   Measure of Success: completed  Supportive Steps to Achieve: patient verbalizes understanding of directions   Barriers: none  Strengths: patient verbalizes understanding of directions   Date to Achieve By: 5/1/19  Patient expressed understanding of goal: yes                  Intervention/Education provided during outreach: yes       Plan:   Clinic Care Coordinator RN routed to PCP for refill   Patient will continue to use medication as directed  Care Coordinator will follow up next month.

## 2019-09-28 DIAGNOSIS — I10 BENIGN ESSENTIAL HYPERTENSION: ICD-10-CM

## 2019-09-30 RX ORDER — LOSARTAN POTASSIUM 50 MG/1
TABLET ORAL
Qty: 30 TABLET | Refills: 0 | Status: SHIPPED | OUTPATIENT
Start: 2019-09-30 | End: 2019-11-04

## 2019-09-30 NOTE — TELEPHONE ENCOUNTER
"Medication is being filled for 1 time refill only due to:  Patient needs to be seen because due for physical Oct 2019.   Erika DEAN RN    Last Written Prescription Date:  6/27/2019  Last Fill Quantity: 90,  # refills: 0   Last office visit: 3/5/2019 with prescribing provider:     Future Office Visit:    Requested Prescriptions   Pending Prescriptions Disp Refills     losartan (COZAAR) 50 MG tablet [Pharmacy Med Name: LOSARTAN 50MG TABLETS] 90 tablet 0     Sig: TAKE 1 TABLET(50 MG) BY MOUTH DAILY       Angiotensin-II Receptors Passed - 9/28/2019  3:37 PM        Passed - Last blood pressure under 140/90 in past 12 months     BP Readings from Last 3 Encounters:   03/15/19 137/85   03/05/19 123/75   02/28/19 160/74                 Passed - Recent (12 mo) or future (30 days) visit within the authorizing provider's specialty     Patient has had an office visit with the authorizing provider or a provider within the authorizing providers department within the previous 12 mos or has a future within next 30 days. See \"Patient Info\" tab in inbasket, or \"Choose Columns\" in Meds & Orders section of the refill encounter.              Passed - Medication is active on med list        Passed - Patient is age 18 or older        Passed - Normal serum creatinine on file in past 12 months     Recent Labs   Lab Test 02/28/19  0730   CR 0.91             Passed - Normal serum potassium on file in past 12 months     Recent Labs   Lab Test 02/28/19  0730   POTASSIUM 4.3                    "

## 2019-10-05 DIAGNOSIS — Z71.6 ENCOUNTER FOR SMOKING CESSATION COUNSELING: ICD-10-CM

## 2019-10-07 ENCOUNTER — MYC MEDICAL ADVICE (OUTPATIENT)
Dept: FAMILY MEDICINE | Facility: CLINIC | Age: 54
End: 2019-10-07

## 2019-10-07 NOTE — TELEPHONE ENCOUNTER
"See MyChart to pt   Last Rx for continuing sofya   Pharmacy requesting starting sofya  Erika DEAN RN    Last Written Prescription Date:    Last Fill Quantity: ,  # refills:    Last office visit: Department has no specialty with prescribing provider:     Future Office Visit:    Requested Prescriptions   Pending Prescriptions Disp Refills     varenicline (CHANTIX STARTING MONTH PAK) 0.5 MG X 11 & 1 MG X 42 tablet [Pharmacy Med Name: CHANTIX STARTING MONTH PAK 53'S] 53 tablet 0     Sig: TAKE 0.5 TABLET BY MOUTH EVERY DAY FOR 3 DAYS, THEN 0.5 TABLET BY MOUTH TWICE DAILY FOR 4 DAYS, THEN 1 MG TWICE DAILY       Partial Cholinergic Nicotinic Agonist Agents Passed - 10/5/2019  9:55 AM        Passed - Blood pressure under 140/90 in past 12 months     BP Readings from Last 3 Encounters:   03/15/19 137/85   03/05/19 123/75   02/28/19 160/74                 Passed - Recent (12 mo) or future (30 days) visit within the authorizing provider's specialty     Patient has had an office visit with the authorizing provider or a provider within the authorizing providers department within the previous 12 mos or has a future within next 30 days. See \"Patient Info\" tab in inbasket, or \"Choose Columns\" in Meds & Orders section of the refill encounter.              Passed - Medication is active on med list        Passed - Patient is 18 years of age or older          "

## 2019-10-07 NOTE — TELEPHONE ENCOUNTER
Patient is on continuing sofya he confirmed  Denied request for starter  Rx sent 9/16/2019  Shouldn't need refill now  Erika DEAN RN

## 2019-10-24 ENCOUNTER — PATIENT OUTREACH (OUTPATIENT)
Dept: CARE COORDINATION | Facility: CLINIC | Age: 54
End: 2019-10-24

## 2019-10-24 NOTE — PROGRESS NOTES
Clinic Care Coordination Contact  [facility]/Voicemail       Clinical Data: Care Coordinator Outreach  Outreach attempted x 2.  Left message on patient's voicemail with call back information and requested return call.  Plan: . Care Coordinator will try to reach patient again in 10-14 business days.

## 2019-11-04 DIAGNOSIS — I10 BENIGN ESSENTIAL HYPERTENSION: ICD-10-CM

## 2019-11-04 NOTE — TELEPHONE ENCOUNTER
"losartan (COZAAR) 50 MG tablet  Last Written Prescription Date:  09/30/19  Last Fill Quantity: 30,  # refills: 0   Last office visit: 3/5/2019 with prescribing provider:  yes   Future Office Visit:    Requested Prescriptions   Pending Prescriptions Disp Refills     losartan (COZAAR) 50 MG tablet [Pharmacy Med Name: LOSARTAN 50MG TABLETS] 30 tablet 0     Sig: TAKE 1 TABLET BY MOUTH DAILY       Angiotensin-II Receptors Passed - 11/4/2019  5:05 AM        Passed - Last blood pressure under 140/90 in past 12 months     BP Readings from Last 3 Encounters:   03/15/19 137/85   03/05/19 123/75   02/28/19 160/74                 Passed - Recent (12 mo) or future (30 days) visit within the authorizing provider's specialty     Patient has had an office visit with the authorizing provider or a provider within the authorizing providers department within the previous 12 mos or has a future within next 30 days. See \"Patient Info\" tab in inbasket, or \"Choose Columns\" in Meds & Orders section of the refill encounter.              Passed - Medication is active on med list        Passed - Patient is age 18 or older        Passed - Normal serum creatinine on file in past 12 months     Recent Labs   Lab Test 02/28/19  0730   CR 0.91             Passed - Normal serum potassium on file in past 12 months     Recent Labs   Lab Test 02/28/19  0730   POTASSIUM 4.3                    "

## 2019-11-05 ENCOUNTER — MYC MEDICAL ADVICE (OUTPATIENT)
Dept: FAMILY MEDICINE | Facility: CLINIC | Age: 54
End: 2019-11-05

## 2019-11-05 NOTE — TELEPHONE ENCOUNTER
One month supply sent 9/30/19  Due for physical. Last 10/9/18    BUYSTANDt message sent to patient asking him to schedule appointment.  Christina Guo RN

## 2019-11-08 RX ORDER — LOSARTAN POTASSIUM 50 MG/1
TABLET ORAL
Qty: 30 TABLET | Refills: 0 | Status: SHIPPED | OUTPATIENT
Start: 2019-11-08 | End: 2019-11-26

## 2019-11-08 NOTE — TELEPHONE ENCOUNTER
LS,  Left VM for patient  See below messages  No response from patient to our outreach  Patient read MyChart but didn't schedule  Please advise on further refill  Thanks,  Erika DEAN RN

## 2019-11-11 ENCOUNTER — PATIENT OUTREACH (OUTPATIENT)
Dept: CARE COORDINATION | Facility: CLINIC | Age: 54
End: 2019-11-11

## 2019-11-11 NOTE — TELEPHONE ENCOUNTER
Pt called to schedule a physical 11/26/19. I wondering if he can get the refill now or a short term Rx due to running low on medication currently.

## 2019-11-11 NOTE — PROGRESS NOTES
Clinic Care Coordination Contact    Assessment: Care Coordinator contacted patient for 2 month follow up.  Patient has continued to follow the plan of care and assessment is negative for any new needs or concerns.    Enrollment status: Graduated.      Plan: No further outreaches at this time.  Patient will continue to follow the plan of care.  If new needs arise a new Care Coordination referral may be placed.  FYI to PCP

## 2019-11-26 ENCOUNTER — OFFICE VISIT (OUTPATIENT)
Dept: FAMILY MEDICINE | Facility: CLINIC | Age: 54
End: 2019-11-26
Payer: COMMERCIAL

## 2019-11-26 VITALS
OXYGEN SATURATION: 96 % | HEIGHT: 71 IN | WEIGHT: 293.2 LBS | HEART RATE: 56 BPM | RESPIRATION RATE: 17 BRPM | TEMPERATURE: 98.6 F | SYSTOLIC BLOOD PRESSURE: 122 MMHG | BODY MASS INDEX: 41.05 KG/M2 | DIASTOLIC BLOOD PRESSURE: 81 MMHG

## 2019-11-26 DIAGNOSIS — E78.00 HYPERCHOLESTEROLEMIA: ICD-10-CM

## 2019-11-26 DIAGNOSIS — R73.01 ELEVATED FASTING GLUCOSE: ICD-10-CM

## 2019-11-26 DIAGNOSIS — R05.9 COUGH: ICD-10-CM

## 2019-11-26 DIAGNOSIS — I10 BENIGN ESSENTIAL HYPERTENSION: ICD-10-CM

## 2019-11-26 DIAGNOSIS — Z00.00 ENCOUNTER FOR ROUTINE ADULT HEALTH EXAMINATION WITHOUT ABNORMAL FINDINGS: Primary | ICD-10-CM

## 2019-11-26 LAB
ALBUMIN SERPL-MCNC: 3.7 G/DL (ref 3.4–5)
ALP SERPL-CCNC: 89 U/L (ref 40–150)
ALT SERPL W P-5'-P-CCNC: 62 U/L (ref 0–70)
ANION GAP SERPL CALCULATED.3IONS-SCNC: 8 MMOL/L (ref 3–14)
AST SERPL W P-5'-P-CCNC: 34 U/L (ref 0–45)
BILIRUB SERPL-MCNC: 0.4 MG/DL (ref 0.2–1.3)
BUN SERPL-MCNC: 19 MG/DL (ref 7–30)
CALCIUM SERPL-MCNC: 9 MG/DL (ref 8.5–10.1)
CHLORIDE SERPL-SCNC: 107 MMOL/L (ref 94–109)
CO2 SERPL-SCNC: 23 MMOL/L (ref 20–32)
CREAT SERPL-MCNC: 1.03 MG/DL (ref 0.66–1.25)
GFR SERPL CREATININE-BSD FRML MDRD: 83 ML/MIN/{1.73_M2}
GLUCOSE SERPL-MCNC: 111 MG/DL (ref 70–99)
HBA1C MFR BLD: 5.9 % (ref 0–5.6)
POTASSIUM SERPL-SCNC: 4.3 MMOL/L (ref 3.4–5.3)
PROT SERPL-MCNC: 7.2 G/DL (ref 6.8–8.8)
SODIUM SERPL-SCNC: 138 MMOL/L (ref 133–144)

## 2019-11-26 PROCEDURE — 99214 OFFICE O/P EST MOD 30 MIN: CPT | Mod: 25 | Performed by: FAMILY MEDICINE

## 2019-11-26 PROCEDURE — 80053 COMPREHEN METABOLIC PANEL: CPT | Performed by: FAMILY MEDICINE

## 2019-11-26 PROCEDURE — 36415 COLL VENOUS BLD VENIPUNCTURE: CPT | Performed by: FAMILY MEDICINE

## 2019-11-26 PROCEDURE — 99396 PREV VISIT EST AGE 40-64: CPT | Performed by: FAMILY MEDICINE

## 2019-11-26 PROCEDURE — 83036 HEMOGLOBIN GLYCOSYLATED A1C: CPT | Performed by: FAMILY MEDICINE

## 2019-11-26 RX ORDER — ALBUTEROL SULFATE 90 UG/1
AEROSOL, METERED RESPIRATORY (INHALATION)
Qty: 18 G | Refills: 3 | Status: SHIPPED | OUTPATIENT
Start: 2019-11-26 | End: 2020-12-10

## 2019-11-26 RX ORDER — PREDNISONE 20 MG/1
TABLET ORAL
Qty: 10 TABLET | Refills: 0 | Status: SHIPPED | OUTPATIENT
Start: 2019-11-26 | End: 2021-08-24

## 2019-11-26 RX ORDER — IPRATROPIUM BROMIDE AND ALBUTEROL SULFATE 2.5; .5 MG/3ML; MG/3ML
1 SOLUTION RESPIRATORY (INHALATION) EVERY 4 HOURS PRN
Qty: 360 ML | Refills: 0 | Status: SHIPPED | OUTPATIENT
Start: 2019-11-26 | End: 2020-03-27

## 2019-11-26 RX ORDER — ATORVASTATIN CALCIUM 40 MG/1
40 TABLET, FILM COATED ORAL DAILY
Qty: 90 TABLET | Refills: 1 | Status: SHIPPED | OUTPATIENT
Start: 2019-11-26 | End: 2020-08-17

## 2019-11-26 RX ORDER — LOSARTAN POTASSIUM 50 MG/1
50 TABLET ORAL DAILY
Qty: 90 TABLET | Refills: 1 | Status: SHIPPED | OUTPATIENT
Start: 2019-11-26 | End: 2020-06-29

## 2019-11-26 ASSESSMENT — MIFFLIN-ST. JEOR: SCORE: 2202.08

## 2019-11-26 NOTE — NURSING NOTE
"Chief Complaint   Patient presents with     Physical     /81   Pulse 56   Temp 98.6  F (37  C) (Oral)   Resp 17   Ht 1.803 m (5' 11\")   Wt 133 kg (293 lb 3.2 oz)   SpO2 96%   BMI 40.89 kg/m   Estimated body mass index is 40.89 kg/m  as calculated from the following:    Height as of this encounter: 1.803 m (5' 11\").    Weight as of this encounter: 133 kg (293 lb 3.2 oz).  Medication Reconciliation: complete        Health Maintenance Due Pending Provider Review:  ACT    Completed today.    Caren Craig MA  [facility]  [phone]  "

## 2019-11-26 NOTE — PROGRESS NOTES
SUBJECTIVE:   CC: Martin Anguiano is an 54 year old male who presents for preventative health visit.     Healthy Habits:     Getting at least 3 servings of Calcium per day:  Yes    Bi-annual eye exam:  NO    Dental care twice a year:  NO    Sleep apnea or symptoms of sleep apnea:  Sleep apnea    Diet:  Carbohydrate counting    Frequency of exercise:  2-3 days/week    Duration of exercise:  15-30 minutes    Taking medications regularly:  Yes    Medication side effects:  None    PHQ-2 Total Score: 0    Additional concerns today:  No    1) HTN , he is currently on losartan 50 mg daily dose and seems to be controlling his BP well, he denies any side effects - he has started exercising regularly and feels better now   2) hypercholesterolemia , he is on Lipitor 40 mg - last lipids checked in august ans they were within goal , no side effects on this medication   3) he states that his asthma is better  Now and he has been on a round of prednisone , he wants to have a refill just in case he needs it , also new Albuterol inhaler .          Today's PHQ-2 Score:   PHQ-2 ( 1999 [employer]) 11/26/2019   Q1: Little interest or pleasure in doing things 0   Q2: Feeling down, depressed or hopeless 0   PHQ-2 Score 0   Q1: Little interest or pleasure in doing things Not at all   Q2: Feeling down, depressed or hopeless Not at all   PHQ-2 Score 0       Abuse: Current or Past(Physical, Sexual or Emotional)- No  Do you feel safe in your environment? Yes        Social History     Tobacco Use     Smoking status: Former Smoker     Packs/day: 1.00     Years: 20.00     Pack years: 20.00     Types: Cigarettes     Last attempt to quit: 10/4/2016     Years since quitting: 3.1     Smokeless tobacco: Never Used     Tobacco comment: last 10 years were off and on, about 1/2016   Substance Use Topics     Alcohol use: Yes     Alcohol/week: 0.0 standard drinks     Comment: 1-2 times per week, 2 drinks     If you drink alcohol do you typically have >3 drinks per  day or >7 drinks per week? No    Alcohol Use 11/26/2019   Prescreen: >3 drinks/day or >7 drinks/week? No   No flowsheet data found.    Last PSA:   PSA   Date Value Ref Range Status   10/21/2016 0.61 0 - 4 ug/L Final       Reviewed orders with patient. Reviewed health maintenance and updated orders accordingly - Yes  Labs reviewed in EPIC  BP Readings from Last 3 Encounters:   11/26/19 122/81   03/15/19 137/85   03/05/19 123/75    Wt Readings from Last 3 Encounters:   11/26/19 133 kg (293 lb 3.2 oz)   03/15/19 128.5 kg (283 lb 4.7 oz)   03/05/19 128.5 kg (283 lb 3.2 oz)                  Patient Active Problem List   Diagnosis     Benign essential hypertension     JOJO (obstructive sleep apnea)     Family history of diabetes mellitus     Glucose intolerance (impaired glucose tolerance)     Non morbid obesity due to excess calories     Screening for prostate cancer     Mixed hyperlipidemia     Tobacco use disorder 19-48y/o @ 1.5ppd for 15 yrs then 1ppd=31-32 pk yr hx     Chronic coronary artery disease     Impotence of organic origin     Family history of coronary artery disease     Gastroesophageal reflux disease     History of vasectomy     Hypercholesterolemia     Vitamin D deficiency     Cellulitis and abscess of leg-healing and closing      Obesity (BMI 35.0-39.9) with comorbidity (H)     Past Surgical History:   Procedure Laterality Date     NO HISTORY OF SURGERY         Social History     Tobacco Use     Smoking status: Former Smoker     Packs/day: 1.00     Years: 20.00     Pack years: 20.00     Types: Cigarettes     Last attempt to quit: 10/4/2016     Years since quitting: 3.1     Smokeless tobacco: Never Used     Tobacco comment: last 10 years were off and on, about 1/2016   Substance Use Topics     Alcohol use: Yes     Alcohol/week: 0.0 standard drinks     Comment: 1-2 times per week, 2 drinks     Family History   Problem Relation Age of Onset     Diabetes Mother      Diabetes Brother      Hypertension Father       Hyperlipidemia Father      Coronary Artery Disease No family hx of      Cerebrovascular Disease No family hx of      Breast Cancer No family hx of      Colon Cancer No family hx of      Prostate Cancer No family hx of      Other Cancer No family hx of      Depression No family hx of      Anxiety Disorder No family hx of      Mental Illness No family hx of      Substance Abuse No family hx of      Anesthesia Reaction No family hx of      Asthma No family hx of      Osteoporosis No family hx of      Genetic Disorder No family hx of      Thyroid Disease No family hx of      Obesity No family hx of      Unknown/Adopted No family hx of          Current Outpatient Medications   Medication Sig Dispense Refill     albuterol (PROAIR HFA/PROVENTIL HFA/VENTOLIN HFA) 108 (90 Base) MCG/ACT inhaler INHALE 2 PUFFS INTO THE LUNGS EVERY 6 HOURS AS NEEDED FOR SHORTNESS OF BREATH OR DIFFICULT BREATHING OR WHEEZING 18 g 3     atorvastatin (LIPITOR) 40 MG tablet Take 1 tablet (40 mg) by mouth daily 90 tablet 1     fluticasone (FLONASE) 50 MCG/ACT nasal spray Spray 1 spray into both nostrils daily 18.2 mL 3     fluticasone (FLOVENT HFA) 44 MCG/ACT inhaler Inhale 1 puff into the lungs 2 times daily 1 Inhaler 3     ipratropium - albuterol 0.5 mg/2.5 mg/3 mL (DUONEB) 0.5-2.5 (3) MG/3ML neb solution Take 1 vial (3 mLs) by nebulization every 4 hours as needed for shortness of breath / dyspnea or wheezing 360 mL 0     losartan (COZAAR) 50 MG tablet Take 1 tablet (50 mg) by mouth daily 90 tablet 1     mometasone (ASMANEX) 110 MCG/INH inhaler Inhale 1 puff into the lungs every evening 1 Inhaler 3     order for DME Equipment being ordered:Nebulizer  Treatment Diagnosis: COPD 1 Device 0     predniSONE (DELTASONE) 10 MG tablet Take 2 pills daily for 3 days , then one pill daily for three days , half a pill daily for two days 10 tablet 0     predniSONE (DELTASONE) 20 MG tablet Take two pills x 2 days then one pill x 3 days then half a pill x 2  "days 10 tablet 0     fluticasone (FLOVENT HFA) 220 MCG/ACT inhaler Inhale 1 puff into the lungs 2 times daily (Patient not taking: Reported on 11/26/2019) 1 Inhaler 3     Allergies   Allergen Reactions     Ibuprofen      Other reaction(s): Other - Describe In Comment Field  Uri type of symptoms; \"watery eyes\". Gel caps are  OK  Eyes water,runny nose     Lisinopril Cough     Recent Labs   Lab Test 11/26/19  1028 08/19/19  0935 02/28/19  0730  10/09/18  1043 05/08/18  1132 03/22/17  1145   A1C 5.9*  --   --   --  6.1* 6.3* 6.1*   LDL  --  73  --   --   --  55 55   HDL  --  48  --   --   --  40 49   TRIG  --  82  --   --   --  132 119   ALT 62  --   --   --  66 68  --    CR 1.03  --  0.91   < > 0.97 0.98  --    GFRESTIMATED 83  --  >90   < > 82 80  --    GFRESTBLACK >90  --  >90   < > >90 >90  --    POTASSIUM 4.3  --  4.3   < > 4.6 4.4  --     < > = values in this interval not displayed.        Reviewed and updated as needed this visit by clinical staff  Allergies         Reviewed and updated as needed this visit by Provider        Past Medical History:   Diagnosis Date     HTN (hypertension)      Hypercholesteremia      JOJO (obstructive sleep apnea)      Pre-diabetes       Past Surgical History:   Procedure Laterality Date     NO HISTORY OF SURGERY         Review of Systems  CONSTITUTIONAL: NEGATIVE for fever, chills, change in weight  INTEGUMENTARY/SKIN: NEGATIVE for worrisome rashes, moles or lesions  EYES: NEGATIVE for vision changes or irritation  ENT: NEGATIVE for ear, mouth and throat problems  RESP: NEGATIVE for significant cough or SOB  CV: NEGATIVE for chest pain, palpitations or peripheral edema  GI: NEGATIVE for nausea, abdominal pain, heartburn, or change in bowel habits   male: negative for dysuria, hematuria, decreased urinary stream, erectile dysfunction, urethral discharge  MUSCULOSKELETAL: NEGATIVE for significant arthralgias or myalgia  NEURO: NEGATIVE for weakness, dizziness or " "paresthesias  PSYCHIATRIC: NEGATIVE for changes in mood or affect    OBJECTIVE:   Ht 1.803 m (5' 11\")   BMI 39.51 kg/m      Physical Exam  GENERAL: healthy, alert and no distress  EYES: Eyes grossly normal to inspection, PERRL and conjunctivae and sclerae normal  HENT: ear canals and TM's normal, nose and mouth without ulcers or lesions  NECK: no adenopathy, no asymmetry, masses, or scars and thyroid normal to palpation  RESP: lungs clear to auscultation - no rales, rhonchi or wheezes  CV: regular rate and rhythm, normal S1 S2, no S3 or S4, no murmur, click or rub, no peripheral edema and peripheral pulses strong  ABDOMEN: soft, nontender, no hepatosplenomegaly, no masses and bowel sounds normal  MS: no gross musculoskeletal defects noted, no edema  SKIN: no suspicious lesions or rashes  NEURO: Normal strength and tone, mentation intact and speech normal  PSYCH: mentation appears normal, affect normal/bright    Diagnostic Test Results:  Labs reviewed in Epic  Results for orders placed or performed in visit on 11/26/19   Hemoglobin A1c     Status: Abnormal   Result Value Ref Range    Hemoglobin A1C 5.9 (H) 0 - 5.6 %   Comprehensive metabolic panel (BMP + Alb, Alk Phos, ALT, AST, Total. Bili, TP)     Status: Abnormal   Result Value Ref Range    Sodium 138 133 - 144 mmol/L    Potassium 4.3 3.4 - 5.3 mmol/L    Chloride 107 94 - 109 mmol/L    Carbon Dioxide 23 20 - 32 mmol/L    Anion Gap 8 3 - 14 mmol/L    Glucose 111 (H) 70 - 99 mg/dL    Urea Nitrogen 19 7 - 30 mg/dL    Creatinine 1.03 0.66 - 1.25 mg/dL    GFR Estimate 83 >60 mL/min/[1.73_m2]    GFR Estimate If Black >90 >60 mL/min/[1.73_m2]    Calcium 9.0 8.5 - 10.1 mg/dL    Bilirubin Total 0.4 0.2 - 1.3 mg/dL    Albumin 3.7 3.4 - 5.0 g/dL    Protein Total 7.2 6.8 - 8.8 g/dL    Alkaline Phosphatase 89 40 - 150 U/L    ALT 62 0 - 70 U/L    AST 34 0 - 45 U/L       ASSESSMENT/PLAN:       1. Encounter for routine adult health examination without abnormal findings  Will " "check labs today but not lipids as he has had his lipids checked in August this yr , normal , on statin   - atorvastatin (LIPITOR) 40 MG tablet; Take 1 tablet (40 mg) by mouth daily  Dispense: 90 tablet; Refill: 1    3. Hypercholesterolemia  Doing well on the lipitor and no side effects lipids checked in august normal .  - atorvastatin (LIPITOR) 40 MG tablet; Take 1 tablet (40 mg) by mouth daily  Dispense: 90 tablet; Refill: 1  - Comprehensive metabolic panel (BMP + Alb, Alk Phos, ALT, AST, Total. Bili, TP)    4. Cough  Is better now but needs a new Rx for Albuterol .  - albuterol (PROAIR HFA/PROVENTIL HFA/VENTOLIN HFA) 108 (90 Base) MCG/ACT inhaler; INHALE 2 PUFFS INTO THE LUNGS EVERY 6 HOURS AS NEEDED FOR SHORTNESS OF BREATH OR DIFFICULT BREATHING OR WHEEZING  Dispense: 18 g; Refill: 3    5. Benign essential hypertension  Seems well controlled in the current med and will continue , will check CMP today .  - losartan (COZAAR) 50 MG tablet; Take 1 tablet (50 mg) by mouth daily  Dispense: 90 tablet; Refill: 1    6. Elevated fasting glucose  Seems that the HGb A 1 c is better now with diet and exercise , we will keep monitoring , it went down from   - Hemoglobin A1c    COUNSELING:   Reviewed preventive health counseling, as reflected in patient instructions       Regular exercise       Healthy diet/nutrition       Vision screening    Estimated body mass index is 39.51 kg/m  as calculated from the following:    Height as of this encounter: 1.803 m (5' 11\").    Weight as of 3/15/19: 128.5 kg (283 lb 4.7 oz).          reports that he quit smoking about 3 years ago. His smoking use included cigarettes. He has a 20.00 pack-year smoking history. He has never used smokeless tobacco.      Counseling Resources:  ATP IV Guidelines  Pooled Cohorts Equation Calculator  FRAX Risk Assessment  ICSI Preventive Guidelines  Dietary Guidelines for Americans, 2010  USDA's MyPlate  ASA Prophylaxis  Lung CA Screening    Johana Tamez, " MD  [facility]

## 2020-01-15 ENCOUNTER — TELEPHONE (OUTPATIENT)
Dept: FAMILY MEDICINE | Facility: CLINIC | Age: 55
End: 2020-01-15

## 2020-01-15 DIAGNOSIS — Z71.6 ENCOUNTER FOR SMOKING CESSATION COUNSELING: ICD-10-CM

## 2020-01-15 NOTE — TELEPHONE ENCOUNTER
LS,  Please see below message and advise.  Last Rx 9/16/2019 #53  Last OV 11/26/2019 - physical  Thanks,  Erika DEAN RN

## 2020-01-15 NOTE — TELEPHONE ENCOUNTER
Reason for Call:  Medication or medication refill:    Do you use a [facility]?  Name of the pharmacy and phone number for the current request:    [id number] - [place] [place] [address] [place] [facility] [place] [address]      Name of the medication requested: Chantix to help stop smoking again    Other request: Please fill this RX the patient started smoking a few again and wants to quit    Can we leave a detailed message on this number? YES    Phone number patient can be reached at: Home number on file [phone] (home)    Best Time: anytime    Call taken on 1/15/2020 at 3:00 PM by Anisha Michael

## 2020-03-02 ENCOUNTER — HEALTH MAINTENANCE LETTER (OUTPATIENT)
Age: 55
End: 2020-03-02

## 2020-03-19 DIAGNOSIS — I10 BENIGN ESSENTIAL HYPERTENSION: ICD-10-CM

## 2020-03-19 RX ORDER — LOSARTAN POTASSIUM 50 MG/1
TABLET ORAL
Start: 2020-03-19

## 2020-03-19 NOTE — TELEPHONE ENCOUNTER
"Denied  Too early; Rx sent 11/26/2019 for 6 months  Erika DEAN RN    Last Written Prescription Date:  11/26/2019  Last Fill Quantity: 90,  # refills: 1   Last office visit: 11/26/2019 with prescribing provider:     Future Office Visit:    Requested Prescriptions   Pending Prescriptions Disp Refills     losartan (COZAAR) 50 MG tablet [Pharmacy Med Name: LOSARTAN 50MG TABLETS] 90 tablet 1     Sig: TAKE 1 TABLET BY MOUTH DAILY       Angiotensin-II Receptors Passed - 3/19/2020  9:06 AM        Passed - Last blood pressure under 140/90 in past 12 months     BP Readings from Last 3 Encounters:   11/26/19 122/81   03/15/19 137/85   03/05/19 123/75                 Passed - Recent (12 mo) or future (30 days) visit within the authorizing provider's specialty     Patient has had an office visit with the authorizing provider or a provider within the authorizing providers department within the previous 12 mos or has a future within next 30 days. See \"Patient Info\" tab in inbasket, or \"Choose Columns\" in Meds & Orders section of the refill encounter.              Passed - Medication is active on med list        Passed - Patient is age 18 or older        Passed - Normal serum creatinine on file in past 12 months     Recent Labs   Lab Test 11/26/19  1028   CR 1.03       Ok to refill medication if creatinine is low          Passed - Normal serum potassium on file in past 12 months     Recent Labs   Lab Test 11/26/19  1028   POTASSIUM 4.3                       "

## 2020-03-27 DIAGNOSIS — R06.2 WHEEZING: Primary | ICD-10-CM

## 2020-03-27 RX ORDER — IPRATROPIUM BROMIDE AND ALBUTEROL SULFATE 2.5; .5 MG/3ML; MG/3ML
SOLUTION RESPIRATORY (INHALATION)
Qty: 360 ML | Refills: 0 | Status: SHIPPED | OUTPATIENT
Start: 2020-03-27 | End: 2020-10-05

## 2020-03-27 NOTE — TELEPHONE ENCOUNTER
"Prescription approved per [facility] Refill Protocol.  Erika DEAN RN    Last Written Prescription Date:  11/26/2019  Last Fill Quantity: 360,  # refills: 0   Last office visit: 11/26/2019 with prescribing provider:     Future Office Visit:    Requested Prescriptions   Pending Prescriptions Disp Refills     ipratropium - albuterol 0.5 mg/2.5 mg/3 mL (DUONEB) 0.5-2.5 (3) MG/3ML neb solution [Pharmacy Med Name: IPRATROPI/ALB 0.5/3MG INH SL 60X3ML] 360 mL 0     Sig: TAKE 1 VIAL BY NEBULIZATION EVERY 4 HOURS AS NEEDED FOR SHORTNESS OR BREATH/DYSPNEA OR WHEEZING       Short-Acting Beta Agonist Inhalers Protocol  Passed - 3/27/2020 10:07 AM        Passed - Patient is age 12 or older        Passed - Recent (12 mo) or future (30 days) visit within the authorizing provider's specialty     Patient has had an office visit with the authorizing provider or a provider within the authorizing providers department within the previous 12 mos or has a future within next 30 days. See \"Patient Info\" tab in inbasket, or \"Choose Columns\" in Meds & Orders section of the refill encounter.              Passed - Medication is active on med list       Asthma Nebs Protocol Passed - 3/27/2020 10:07 AM        Passed - Patient is age 4 years or older        Passed - Recent (12 mo) or future (30 days) visit within the authorizing provider's specialty     Patient has had an office visit with the authorizing provider or a provider within the authorizing providers department within the previous 12 mos or has a future within next 30 days. See \"Patient Info\" tab in inbasket, or \"Choose Columns\" in Meds & Orders section of the refill encounter.              Passed - Medication is active on med list           "

## 2020-04-30 ENCOUNTER — TELEPHONE (OUTPATIENT)
Dept: FAMILY MEDICINE | Facility: CLINIC | Age: 55
End: 2020-04-30

## 2020-04-30 NOTE — TELEPHONE ENCOUNTER
Summary:    Patient is due/failing the following:   INFLUENZA    Type of outreach:  ABSTRACTED  Action needed: NONE    If need for provider review:    Please indicate OV, lab, MTM, or nurse appt if needed.  Indicate fasting or not fasting.                                                                                                                                          Marie Byers CMA on 4/30/2020 at 1:27 PM

## 2020-07-07 DIAGNOSIS — R09.82 POST-NASAL DRIP: ICD-10-CM

## 2020-07-08 RX ORDER — FLUTICASONE PROPIONATE 50 MCG
SPRAY, SUSPENSION (ML) NASAL
Qty: 48 G | Refills: 0 | Status: SHIPPED | OUTPATIENT
Start: 2020-07-08 | End: 2021-09-07

## 2020-08-04 DIAGNOSIS — Z71.6 ENCOUNTER FOR SMOKING CESSATION COUNSELING: ICD-10-CM

## 2020-08-04 NOTE — TELEPHONE ENCOUNTER
"Request is for starter pack   Last Rx for continuing pack     Called pt   He's on continuing pack   Should have several refills of that    Denied refill   \"Patient now on continuing pack\"  Erika DEAN RN    "

## 2020-12-03 DIAGNOSIS — Z00.00 ENCOUNTER FOR ROUTINE ADULT HEALTH EXAMINATION WITHOUT ABNORMAL FINDINGS: ICD-10-CM

## 2020-12-03 DIAGNOSIS — E78.00 HYPERCHOLESTEROLEMIA: ICD-10-CM

## 2020-12-03 RX ORDER — ATORVASTATIN CALCIUM 40 MG/1
40 TABLET, FILM COATED ORAL DAILY
Qty: 30 TABLET | Refills: 0 | Status: SHIPPED | OUTPATIENT
Start: 2020-12-03 | End: 2020-12-30

## 2020-12-03 NOTE — TELEPHONE ENCOUNTER
Atorvastatin:    Medication is being filled for 1 time refill only due to:  Patient needs to be seen because it has been more than one year since last visit.   Due for physical and fasting labs.  Last 11/26/2019.    Christina Guo RN

## 2020-12-09 DIAGNOSIS — R05.9 COUGH: ICD-10-CM

## 2020-12-10 RX ORDER — ALBUTEROL SULFATE 90 UG/1
AEROSOL, METERED RESPIRATORY (INHALATION)
Qty: 18 G | Refills: 0 | Status: SHIPPED | OUTPATIENT
Start: 2020-12-10 | End: 2021-03-11

## 2020-12-10 NOTE — TELEPHONE ENCOUNTER
Medication is being filled for 1 time refill only due to:  Patient needs to be seen because it has been more than one year since last visit.   Erika DEAN RN

## 2020-12-15 DIAGNOSIS — I10 BENIGN ESSENTIAL HYPERTENSION: ICD-10-CM

## 2020-12-15 RX ORDER — LOSARTAN POTASSIUM 50 MG/1
50 TABLET ORAL DAILY
Qty: 30 TABLET | Refills: 0 | Status: SHIPPED | OUTPATIENT
Start: 2020-12-15 | End: 2021-01-15

## 2020-12-15 NOTE — TELEPHONE ENCOUNTER
Losartan:    Medication is being filled for 1 time refill only due to:  Patient needs to be seen because it has been more than one year since last visit.     Due for physical.  Last 11/26/2020.  Christina Guo RN

## 2020-12-15 NOTE — TELEPHONE ENCOUNTER
Medication Question or Refill    Who is calling: patient thought this was requested over a week ago and he is completely out    What medication are you calling about (include dose and sig)?:    losartan (COZAAR) 50 MG tablet           Who prescribed the medication?: mervat    Do you need a refill? Yes: please physical is due please give a bakari period as patient is not aware of this    When did you use the medication last? Over a week ago    Patient offered an appointment? no    Do you have any questions or concerns?  Yes: that he hasnt used for over a week    Requested Pharmacy: [id number] [place], [place] - [address] [place] [facility] [address] [address] [facility] [address]      Giuseppe to leave a detailed message?: Yes at Cell number on file:    Telephone Information:   Mobile [phone]

## 2020-12-20 ENCOUNTER — HEALTH MAINTENANCE LETTER (OUTPATIENT)
Age: 55
End: 2020-12-20

## 2020-12-23 DIAGNOSIS — R06.2 WHEEZING: ICD-10-CM

## 2020-12-28 DIAGNOSIS — E78.00 HYPERCHOLESTEROLEMIA: ICD-10-CM

## 2020-12-28 DIAGNOSIS — Z00.00 ENCOUNTER FOR ROUTINE ADULT HEALTH EXAMINATION WITHOUT ABNORMAL FINDINGS: ICD-10-CM

## 2020-12-28 RX ORDER — IPRATROPIUM BROMIDE AND ALBUTEROL SULFATE 2.5; .5 MG/3ML; MG/3ML
SOLUTION RESPIRATORY (INHALATION)
Qty: 360 ML | Refills: 0 | Status: SHIPPED | OUTPATIENT
Start: 2020-12-28 | End: 2021-03-11

## 2020-12-28 NOTE — TELEPHONE ENCOUNTER
"Routing refill request to provider for review/approval because:  Patient needs to be seen because it has been more than 1 year since last office visit.  Added in pharm comments and sent Mychart to schedule.  Please authorize if appropriate.  Thanks,  Jocelin Mckeon RN        Requested Prescriptions   Pending Prescriptions Disp Refills     ipratropium - albuterol 0.5 mg/2.5 mg/3 mL (DUONEB) 0.5-2.5 (3) MG/3ML neb solution 360 mL 0     Sig: INHALE THE CONTENTS OF ONE VIAL VIA NEBULIZATION EVERY 4 HOURS AS NEEDED FOR SHORTNESS OF BREATH/DYSPNEA OR WHEEZING       Short-Acting Beta Agonist Inhalers Protocol  Failed - 12/23/2020 10:01 AM        Failed - Recent (12 mo) or future (30 days) visit within the authorizing provider's specialty     Patient has had an office visit with the authorizing provider or a provider within the authorizing providers department within the previous 12 mos or has a future within next 30 days. See \"Patient Info\" tab in inbasket, or \"Choose Columns\" in Meds & Orders section of the refill encounter.              Passed - Patient is age 12 or older        Passed - Medication is active on med list       Asthma Nebs Protocol Failed - 12/23/2020 10:01 AM        Failed - Recent (12 mo) or future (30 days) visit within the authorizing provider's specialty     Patient has had an office visit with the authorizing provider or a provider within the authorizing providers department within the previous 12 mos or has a future within next 30 days. See \"Patient Info\" tab in inbasket, or \"Choose Columns\" in Meds & Orders section of the refill encounter.              Passed - Patient is age 4 years or older        Passed - Medication is active on med list                   "

## 2020-12-31 NOTE — TELEPHONE ENCOUNTER
LS,  See below messages  No response from patient to our outreach  Please advise on further refill  Thanks,  Erika DEAN RN

## 2021-01-01 RX ORDER — ATORVASTATIN CALCIUM 40 MG/1
40 TABLET, FILM COATED ORAL DAILY
Qty: 30 TABLET | Refills: 0 | Status: SHIPPED | OUTPATIENT
Start: 2021-01-01 | End: 2021-01-15

## 2021-01-15 ENCOUNTER — OFFICE VISIT (OUTPATIENT)
Dept: FAMILY MEDICINE | Facility: CLINIC | Age: 56
End: 2021-01-15
Payer: COMMERCIAL

## 2021-01-15 ENCOUNTER — HEALTH MAINTENANCE LETTER (OUTPATIENT)
Age: 56
End: 2021-01-15

## 2021-01-15 VITALS
TEMPERATURE: 97.1 F | DIASTOLIC BLOOD PRESSURE: 83 MMHG | RESPIRATION RATE: 16 BRPM | HEIGHT: 72 IN | BODY MASS INDEX: 39.42 KG/M2 | OXYGEN SATURATION: 96 % | HEART RATE: 54 BPM | SYSTOLIC BLOOD PRESSURE: 141 MMHG | WEIGHT: 291 LBS

## 2021-01-15 DIAGNOSIS — E78.00 HYPERCHOLESTEROLEMIA: ICD-10-CM

## 2021-01-15 DIAGNOSIS — R73.01 ELEVATED FASTING GLUCOSE: ICD-10-CM

## 2021-01-15 DIAGNOSIS — I10 BENIGN ESSENTIAL HYPERTENSION: ICD-10-CM

## 2021-01-15 DIAGNOSIS — Z00.00 ENCOUNTER FOR ROUTINE ADULT HEALTH EXAMINATION WITHOUT ABNORMAL FINDINGS: Primary | ICD-10-CM

## 2021-01-15 LAB — HBA1C MFR BLD: 6.3 % (ref 0–5.6)

## 2021-01-15 PROCEDURE — 80053 COMPREHEN METABOLIC PANEL: CPT | Performed by: FAMILY MEDICINE

## 2021-01-15 PROCEDURE — 83036 HEMOGLOBIN GLYCOSYLATED A1C: CPT | Performed by: FAMILY MEDICINE

## 2021-01-15 PROCEDURE — 36415 COLL VENOUS BLD VENIPUNCTURE: CPT | Performed by: FAMILY MEDICINE

## 2021-01-15 PROCEDURE — 80061 LIPID PANEL: CPT | Performed by: FAMILY MEDICINE

## 2021-01-15 PROCEDURE — 99396 PREV VISIT EST AGE 40-64: CPT | Performed by: FAMILY MEDICINE

## 2021-01-15 PROCEDURE — 99214 OFFICE O/P EST MOD 30 MIN: CPT | Mod: 25 | Performed by: FAMILY MEDICINE

## 2021-01-15 RX ORDER — LOSARTAN POTASSIUM 50 MG/1
50 TABLET ORAL DAILY
Qty: 90 TABLET | Refills: 1 | Status: SHIPPED | OUTPATIENT
Start: 2021-01-15 | End: 2021-08-24

## 2021-01-15 RX ORDER — ATORVASTATIN CALCIUM 40 MG/1
40 TABLET, FILM COATED ORAL DAILY
Qty: 90 TABLET | Refills: 1 | Status: SHIPPED | OUTPATIENT
Start: 2021-01-15 | End: 2021-06-22

## 2021-01-15 ASSESSMENT — MIFFLIN-ST. JEOR: SCORE: 2202.97

## 2021-01-15 NOTE — PROGRESS NOTES
3  SUBJECTIVE:   CC: Martin Anguiano is an 55 year old male who presents for preventive health visit.       Patient has been advised of split billing requirements and indicates understanding: Yes  Healthy Habits:    Answers for HPI/ROS submitted by the patient on 1/15/2021   Annual Exam:  Frequency of exercise:: 2-3 days/week  Getting at least 3 servings of Calcium per day:: Yes  Diet:: Low fat/cholesterol  Taking medications regularly:: Yes  Medication side effects:: None  Bi-annual eye exam:: NO  Dental care twice a year:: NO  Sleep apnea or symptoms of sleep apnea:: Sleep apnea  Additional concerns today:: No  Duration of exercise:: 15-30 minutes    PROBLEMS TO ADD ON...  1) HTN , he has been on the losartan 50 mg daily and states that BP has been well controlled , no side effects and feels well   2) Hypercholesterolemia , he is on Lipitor 40 mg , no side effects due for lipids check   3) elevated fasting glucose and he has been rediabetic for a while , will check hgb A 1 c today     Today's PHQ-2 Score:   PHQ-2 (  [employer]) 1/15/2021 2019   Q1: Little interest or pleasure in doing things 0 0   Q2: Feeling down, depressed or hopeless 1 0   PHQ-2 Score 1 0   Q1: Little interest or pleasure in doing things Not at all Not at all   Q2: Feeling down, depressed or hopeless Several days Not at all   PHQ-2 Score 1 0       Abuse: Current or Past(Physical, Sexual or Emotional)- No  Do you feel safe in your environment? Yes        Social History     Tobacco Use     Smoking status: Former Smoker     Packs/day: 1.00     Years: 20.00     Pack years: 20.00     Types: Cigarettes     Quit date: 10/4/2016     Years since quittin.2     Smokeless tobacco: Never Used     Tobacco comment: last 10 years were off and on, about 2016   Substance Use Topics     Alcohol use: Yes     Alcohol/week: 0.0 standard drinks     Comment: 1-2 times per week, 2 drinks     If you drink alcohol do you typically have >3 drinks per day or >7  drinks per week? No                      Last PSA:   PSA   Date Value Ref Range Status   10/21/2016 0.61 0 - 4 ug/L Final       Reviewed orders with patient. Reviewed health maintenance and updated orders accordingly - Yes  Labs reviewed in EPIC    Reviewed and updated as needed this visit by clinical staff  Tobacco  Allergies  Meds              Reviewed and updated as needed this visit by Provider                Past Medical History:   Diagnosis Date     HTN (hypertension)      Hypercholesteremia      JOJO (obstructive sleep apnea)      Pre-diabetes       Past Surgical History:   Procedure Laterality Date     NO HISTORY OF SURGERY         ROS:  CONSTITUTIONAL: NEGATIVE for fever, chills, change in weight  INTEGUMENTARY/SKIN: NEGATIVE for worrisome rashes, moles or lesions  EYES: NEGATIVE for vision changes or irritation  ENT: NEGATIVE for ear, mouth and throat problems  RESP: NEGATIVE for significant cough or SOB  CV: NEGATIVE for chest pain, palpitations or peripheral edema  GI: NEGATIVE for nausea, abdominal pain, heartburn, or change in bowel habits   male: negative for dysuria, hematuria, decreased urinary stream, erectile dysfunction, urethral discharge  MUSCULOSKELETAL: NEGATIVE for significant arthralgias or myalgia  NEURO: NEGATIVE for weakness, dizziness or paresthesias  PSYCHIATRIC: NEGATIVE for changes in mood or affect    OBJECTIVE:   BP (!) 141/83 (BP Location: Right arm, Cuff Size: Adult Large)   Pulse 54   Temp 97.1  F (36.2  C) (Tympanic)   Resp 16   Ht 1.829 m (6')   Wt 132 kg (291 lb)   SpO2 96%   BMI 39.47 kg/m    EXAM:  GENERAL: healthy, alert and no distress  EYES: Eyes grossly normal to inspection, PERRL and conjunctivae and sclerae normal  HENT: ear canals and TM's normal, nose and mouth without ulcers or lesions  NECK: no adenopathy, no asymmetry, masses, or scars and thyroid normal to palpation  RESP: lungs clear to auscultation - no rales, rhonchi or wheezes  CV: regular rate  and rhythm, normal S1 S2, no S3 or S4, no murmur, click or rub, no peripheral edema and peripheral pulses strong  ABDOMEN: soft, nontender, no hepatosplenomegaly, no masses and bowel sounds normal  MS: no gross musculoskeletal defects noted, no edema  SKIN: no suspicious lesions or rashes  NEURO: Normal strength and tone, mentation intact and speech normal  PSYCH: mentation appears normal, affect normal/bright    Diagnostic Test Results:  Labs reviewed in Epic  Results for orders placed or performed in visit on 01/15/21   Lipid panel reflex to direct LDL Fasting     Status: None   Result Value Ref Range    Cholesterol 139 <200 mg/dL    Triglycerides 114 <150 mg/dL    HDL Cholesterol 50 >39 mg/dL    LDL Cholesterol Calculated 66 <100 mg/dL    Non HDL Cholesterol 89 <130 mg/dL   Hemoglobin A1c     Status: Abnormal   Result Value Ref Range    Hemoglobin A1C 6.3 (H) 0 - 5.6 %   Comprehensive metabolic panel (BMP + Alb, Alk Phos, ALT, AST, Total. Bili, TP)     Status: Abnormal   Result Value Ref Range    Sodium 138 133 - 144 mmol/L    Potassium 4.4 3.4 - 5.3 mmol/L    Chloride 108 94 - 109 mmol/L    Carbon Dioxide 25 20 - 32 mmol/L    Anion Gap 5 3 - 14 mmol/L    Glucose 113 (H) 70 - 99 mg/dL    Urea Nitrogen 16 7 - 30 mg/dL    Creatinine 0.94 0.66 - 1.25 mg/dL    GFR Estimate >90 >60 mL/min/[1.73_m2]    GFR Estimate If Black >90 >60 mL/min/[1.73_m2]    Calcium 8.9 8.5 - 10.1 mg/dL    Bilirubin Total 0.7 0.2 - 1.3 mg/dL    Albumin 4.0 3.4 - 5.0 g/dL    Protein Total 7.6 6.8 - 8.8 g/dL    Alkaline Phosphatase 82 40 - 150 U/L    ALT 56 0 - 70 U/L    AST 27 0 - 45 U/L       ASSESSMENT/PLAN:   1. Encounter for routine adult health examination without abnormal findings  Due for fasting labs today   - atorvastatin (LIPITOR) 40 MG tablet; Take 1 tablet (40 mg) by mouth daily  Dispense: 90 tablet; Refill: 1  Wanted a letter form me stating that he needs his dog for emotional support animal , as not able to bring it to his mom's  apartment building otherwise .  2. Benign essential hypertension  Will check metabolic panel but he id doing well on the current med and states that his BP is well controlled   - losartan (COZAAR) 50 MG tablet; Take 1 tablet (50 mg) by mouth daily  Dispense: 90 tablet; Refill: 1    3. Hypercholesterolemia  Fasting for lipids check and also CMP , doing well and continue with the Lipitor   - Lipid panel reflex to direct LDL Fasting  - Comprehensive metabolic panel (BMP + Alb, Alk Phos, ALT, AST, Total. Bili, TP)  - atorvastatin (LIPITOR) 40 MG tablet; Take 1 tablet (40 mg) by mouth daily  Dispense: 90 tablet; Refill: 1    4. Elevated fasting glucose  hgb ! c has gone up to 6.3 , was at 5.9 November 2019 , diet and exercise and if not improved might need to start medication like metformin .  - Hemoglobin A1c  - Comprehensive metabolic panel (BMP + Alb, Alk Phos, ALT, AST, Total. Bili, TP)    Patient has been advised of split billing requirements and indicates understanding: Yes  COUNSELING:  Reviewed preventive health counseling, as reflected in patient instructions       Regular exercise       Healthy diet/nutrition       Vision screening       Hearing screening    Estimated body mass index is 39.47 kg/m  as calculated from the following:    Height as of this encounter: 1.829 m (6').    Weight as of this encounter: 132 kg (291 lb).        He reports that he quit smoking about 4 years ago. His smoking use included cigarettes. He has a 20.00 pack-year smoking history. He has never used smokeless tobacco.      Counseling Resources:  ATP IV Guidelines  Pooled Cohorts Equation Calculator  FRAX Risk Assessment  ICSI Preventive Guidelines  Dietary Guidelines for Americans, 2010  USDA's MyPlate  ASA Prophylaxis  Lung CA Screening    Johana Tamez MD  [facility]

## 2021-01-15 NOTE — LETTER
[facility]  [address]  [address]  [place], [place] [address]  [phone]    January 15, 2021    RE:  Martin Anguiano                                                                                                                                                       [address] [place] [address]            To whom it may concern:    Martin Anguiano is under my professional care:  He needs to have his dog with him as it is an emotional support animal .    Sincerely,        Johana Tamez MD      Clinic hours:  Monday 7:30 AM - 5:00 PM    Tuesday  7:00 AM - 7:00 PM    Wednesday  7:00 AM - 5:00 PM    Thursday  7:30 AM -  7:00 PM    Friday   7:30 AM -  5:00 PM

## 2021-01-15 NOTE — NURSING NOTE
Chief Complaint   Patient presents with     Physical     initial BP (!) 141/83 (BP Location: Right arm, Cuff Size: Adult Large)   Pulse 54   Temp 97.1  F (36.2  C) (Tympanic)   Resp 16   Ht 1.829 m (6')   Wt 132 kg (291 lb)   SpO2 96%   BMI 39.47 kg/m   Estimated body mass index is 39.47 kg/m  as calculated from the following:    Height as of this encounter: 1.829 m (6').    Weight as of this encounter: 132 kg (291 lb).  BP completed using cuff size: large.  R arm      Health Maintenance that is potentially due pending provider review:  NONE    n/a    Clifford Kaufman ma

## 2021-01-16 LAB
ALBUMIN SERPL-MCNC: 4 G/DL (ref 3.4–5)
ALP SERPL-CCNC: 82 U/L (ref 40–150)
ALT SERPL W P-5'-P-CCNC: 56 U/L (ref 0–70)
ANION GAP SERPL CALCULATED.3IONS-SCNC: 5 MMOL/L (ref 3–14)
AST SERPL W P-5'-P-CCNC: 27 U/L (ref 0–45)
BILIRUB SERPL-MCNC: 0.7 MG/DL (ref 0.2–1.3)
BUN SERPL-MCNC: 16 MG/DL (ref 7–30)
CALCIUM SERPL-MCNC: 8.9 MG/DL (ref 8.5–10.1)
CHLORIDE SERPL-SCNC: 108 MMOL/L (ref 94–109)
CHOLEST SERPL-MCNC: 139 MG/DL
CO2 SERPL-SCNC: 25 MMOL/L (ref 20–32)
CREAT SERPL-MCNC: 0.94 MG/DL (ref 0.66–1.25)
GFR SERPL CREATININE-BSD FRML MDRD: >90 ML/MIN/{1.73_M2}
GLUCOSE SERPL-MCNC: 113 MG/DL (ref 70–99)
HDLC SERPL-MCNC: 50 MG/DL
LDLC SERPL CALC-MCNC: 66 MG/DL
NONHDLC SERPL-MCNC: 89 MG/DL
POTASSIUM SERPL-SCNC: 4.4 MMOL/L (ref 3.4–5.3)
PROT SERPL-MCNC: 7.6 G/DL (ref 6.8–8.8)
SODIUM SERPL-SCNC: 138 MMOL/L (ref 133–144)
TRIGL SERPL-MCNC: 114 MG/DL

## 2021-03-31 ENCOUNTER — IMMUNIZATION (OUTPATIENT)
Dept: PEDIATRICS | Facility: CLINIC | Age: 56
End: 2021-03-31
Payer: COMMERCIAL

## 2021-03-31 PROCEDURE — 91300 PR COVID VAC PFIZER DIL RECON 30 MCG/0.3 ML IM: CPT

## 2021-03-31 PROCEDURE — 0001A PR COVID VAC PFIZER DIL RECON 30 MCG/0.3 ML IM: CPT

## 2021-04-21 ENCOUNTER — IMMUNIZATION (OUTPATIENT)
Dept: PEDIATRICS | Facility: CLINIC | Age: 56
End: 2021-04-21
Attending: INTERNAL MEDICINE
Payer: COMMERCIAL

## 2021-04-21 PROCEDURE — 91300 PR COVID VAC PFIZER DIL RECON 30 MCG/0.3 ML IM: CPT

## 2021-04-21 PROCEDURE — 0002A PR COVID VAC PFIZER DIL RECON 30 MCG/0.3 ML IM: CPT

## 2021-05-05 ENCOUNTER — TELEPHONE (OUTPATIENT)
Dept: FAMILY MEDICINE | Facility: CLINIC | Age: 56
End: 2021-05-05

## 2021-05-05 NOTE — TELEPHONE ENCOUNTER
Patient Quality Outreach      Summary:    Patient has the following on his problem list/HM:   Asthma review       ACT Total Scores 11/26/2019   ACT TOTAL SCORE (Goal Greater than or Equal to 20) 18   In the past 12 months, how many times were you hospitalized overnight because of your asthma? 0          Hypertension   Last three blood pressure readings:  BP Readings from Last 3 Encounters:   01/15/21 (!) 141/83   11/26/19 122/81   03/15/19 137/85     Blood pressure: Failed    HTN Guidelines:  ? 139/89     Patient is due/failing the following:   Asthma follow-up visit and Hypertension follow-up visit    Type of outreach:    Phone, spoke to patient/parent. Scheduled pt for OV 5/19/21.    Questions for provider review:    None                                                                                                                                     Stacy Liao MA on 5/5/2021 at 3:32 PM       Chart routed to .

## 2021-05-24 NOTE — PROGRESS NOTES
Assessment & Plan     Benign essential hypertension  - losartan (COZAAR) 100 MG tablet; Take 1 tablet (100 mg) by mouth daily  Since his BP has been borderline high last few visits ( he does not check at home ) will increase losartan to 10 mg , he has been on the 50 mg for a while and no side effects   Elevated fasting glucose  Will check hgb a 1 c today as it was 6.3 last time three months ago and he has done some lifestyle changes so it might be lower   - Hemoglobin A1c  - Basic metabolic panel  (Ca, Cl, CO2, Creat, Gluc, K, Na, BUN)      Cough  Refilled his inhalers , seems that asthma is under control currently   - albuterol (PROAIR HFA/PROVENTIL HFA/VENTOLIN HFA) 108 (90 Base) MCG/ACT inhaler; Inhale two puffs every two hours as needed for shortness of breath or difficulty breathing  - fluticasone (FLOVENT HFA) 220 MCG/ACT inhaler; Inhale 1 puff into the lungs 2 times daily    Carpal tunnel syndrome of left wrist  We discussed seeing ortho for the left wrist , thumb pain , possible tendonitis   - Orthopedic & Spine  Referral; Future  Discussed rest , wrist brace , ice and NSAIDS as needed for the pain , will see hand specialist as this has been going on for 6 months on and off and now worse .           BMI:   Estimated body mass index is 39.06 kg/m  as calculated from the following:    Height as of this encounter: 1.829 m (6').    Weight as of this encounter: 130.6 kg (288 lb).     Follow up in one month sooner if any concerns   Pt is aware  and comfortable with the current plan.      Johana Tamez MD  [facility]    Cr Salazar is a 56 year old who presents for the following health issues     HPI     Hypertension Follow-up- sems that systolic is always borderline to high on th e 50 mg of losartsan , no side effects       Do you check your blood pressure regularly outside of the clinic? No     Are you following a low salt diet? Yes    Are your blood pressures ever more  than 140 on the top number (systolic) OR more   than 90 on the bottom number (diastolic), for example 140/90? No    Asthma Follow-Up- he is on a maintenance inhaler and also uses Albuterol curently is stable     Was ACT completed today?    Yes    ACT Total Scores 11/26/2019   ACT TOTAL SCORE (Goal Greater than or Equal to 20) 18   In the past 12 months, how many times were you hospitalized overnight because of your asthma? 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  0    Please describe any recent triggers for your asthma: None    Have you had any Emergency Room Visits, Urgent Care Visits, or Hospital Admissions since your last office visit?  No      How many servings of fruits and vegetables do you eat daily?  2-3    On average, how many sweetened beverages do you drink each day (Examples: soda, juice, sweet tea, etc.  Do NOT count diet or artificially sweetened beverages)?   0    How many days per week do you exercise enough to make your heart beat faster? 3 or less    How many minutes a day do you exercise enough to make your heart beat faster? 30 - 60    How many days per week do you miss taking your medication? 0        Review of Systems   Constitutional, HEENT, cardiovascular, pulmonary, GI, , musculoskeletal, neuro, skin, endocrine and psych systems are negative, except as otherwise noted.      Objective    There were no vitals taken for this visit.  There is no height or weight on file to calculate BMI.  Physical Exam   GENERAL: healthy, alert and no distress  EYES: Eyes grossly normal to inspection, PERRL and conjunctivae and sclerae normal  NECK: no adenopathy, no asymmetry, masses, or scars and thyroid normal to palpation  RESP:  lungs clear to auscultation - no rales, rhonchi or wheezes  CV: regular rate and rhythm, normal S1 S2, no S3 or S4, no murmur, click or rub, no peripheral edema and peripheral pulses strong  ABDOMEN: soft, nontender, no hepatosplenomegaly, no masses and bowel sounds normal  MS: no gross musculoskeletal defects noted, no edema, some pain along the radial spect of the left thumb and tendon of the pollicis longus on that wrist , no erythema no edema , limited ROM   NEURO: Normal strength and tone, mentation intact and speech normal    Results for orders placed or performed in visit on 05/25/21 (from the past 24 hour(s))   Hemoglobin A1c   Result Value Ref Range    Hemoglobin A1C 7.1 (H) 0 - 5.6 %   Basic metabolic panel  (Ca, Cl, CO2, Creat, Gluc, K, Na, BUN)   Result Value Ref Range    Sodium 139 133 - 144 mmol/L    Potassium 4.5 3.4 - 5.3 mmol/L    Chloride 106 94 - 109 mmol/L    Carbon Dioxide 25 20 - 32 mmol/L    Anion Gap 8 3 - 14 mmol/L    Glucose 140 (H) 70 - 99 mg/dL    Urea Nitrogen 19 7 - 30 mg/dL    Creatinine 0.90 0.66 - 1.25 mg/dL    GFR Estimate >90 >60 mL/min/[1.73_m2]    GFR Estimate If Black >90 >60 mL/min/[1.73_m2]    Calcium 8.8 8.5 - 10.1 mg/dL

## 2021-05-25 ENCOUNTER — OFFICE VISIT (OUTPATIENT)
Dept: FAMILY MEDICINE | Facility: CLINIC | Age: 56
End: 2021-05-25
Payer: COMMERCIAL

## 2021-05-25 VITALS
RESPIRATION RATE: 16 BRPM | SYSTOLIC BLOOD PRESSURE: 138 MMHG | OXYGEN SATURATION: 96 % | HEIGHT: 72 IN | WEIGHT: 288 LBS | BODY MASS INDEX: 39.01 KG/M2 | DIASTOLIC BLOOD PRESSURE: 88 MMHG | TEMPERATURE: 97.2 F | HEART RATE: 53 BPM

## 2021-05-25 DIAGNOSIS — G56.02 CARPAL TUNNEL SYNDROME OF LEFT WRIST: ICD-10-CM

## 2021-05-25 DIAGNOSIS — I10 BENIGN ESSENTIAL HYPERTENSION: Primary | ICD-10-CM

## 2021-05-25 DIAGNOSIS — R05.9 COUGH: ICD-10-CM

## 2021-05-25 DIAGNOSIS — R73.01 ELEVATED FASTING GLUCOSE: ICD-10-CM

## 2021-05-25 LAB
ANION GAP SERPL CALCULATED.3IONS-SCNC: 8 MMOL/L (ref 3–14)
BUN SERPL-MCNC: 19 MG/DL (ref 7–30)
CALCIUM SERPL-MCNC: 8.8 MG/DL (ref 8.5–10.1)
CHLORIDE SERPL-SCNC: 106 MMOL/L (ref 94–109)
CO2 SERPL-SCNC: 25 MMOL/L (ref 20–32)
CREAT SERPL-MCNC: 0.9 MG/DL (ref 0.66–1.25)
GFR SERPL CREATININE-BSD FRML MDRD: >90 ML/MIN/{1.73_M2}
GLUCOSE SERPL-MCNC: 140 MG/DL (ref 70–99)
HBA1C MFR BLD: 7.1 % (ref 0–5.6)
POTASSIUM SERPL-SCNC: 4.5 MMOL/L (ref 3.4–5.3)
SODIUM SERPL-SCNC: 139 MMOL/L (ref 133–144)

## 2021-05-25 PROCEDURE — 99214 OFFICE O/P EST MOD 30 MIN: CPT | Performed by: FAMILY MEDICINE

## 2021-05-25 PROCEDURE — 80048 BASIC METABOLIC PNL TOTAL CA: CPT | Performed by: FAMILY MEDICINE

## 2021-05-25 PROCEDURE — 36415 COLL VENOUS BLD VENIPUNCTURE: CPT | Performed by: FAMILY MEDICINE

## 2021-05-25 PROCEDURE — 83036 HEMOGLOBIN GLYCOSYLATED A1C: CPT | Performed by: FAMILY MEDICINE

## 2021-05-25 RX ORDER — FLUTICASONE PROPIONATE 220 UG/1
1 AEROSOL, METERED RESPIRATORY (INHALATION) 2 TIMES DAILY
Qty: 12 G | Refills: 3
Start: 2021-05-25 | End: 2021-08-24

## 2021-05-25 RX ORDER — LOSARTAN POTASSIUM 100 MG/1
100 TABLET ORAL DAILY
Qty: 90 TABLET | Refills: 1 | Status: SHIPPED | OUTPATIENT
Start: 2021-05-25 | End: 2021-11-15

## 2021-05-25 RX ORDER — ALBUTEROL SULFATE 90 UG/1
AEROSOL, METERED RESPIRATORY (INHALATION)
Qty: 18 G | Refills: 0 | Status: SHIPPED | OUTPATIENT
Start: 2021-05-25 | End: 2021-08-10

## 2021-05-25 ASSESSMENT — MIFFLIN-ST. JEOR: SCORE: 2184.36

## 2021-05-25 NOTE — NURSING NOTE
Chief Complaint   Patient presents with     Asthma     initial BP (!) 148/94 (BP Location: Right arm, Cuff Size: Adult Large)   Pulse 53   Temp 97.2  F (36.2  C) (Oral)   Resp 16   Ht 1.829 m (6')   Wt 130.6 kg (288 lb)   SpO2 96%   BMI 39.06 kg/m   Estimated body mass index is 39.06 kg/m  as calculated from the following:    Height as of this encounter: 1.829 m (6').    Weight as of this encounter: 130.6 kg (288 lb).  BP completed using cuff size: large.   R arm      Health Maintenance that is potentially due pending provider review:  NONE    n/a    Clifford Kaufman ma

## 2021-05-26 ASSESSMENT — ASTHMA QUESTIONNAIRES: ACT_TOTALSCORE: 22

## 2021-06-21 DIAGNOSIS — Z00.00 ENCOUNTER FOR ROUTINE ADULT HEALTH EXAMINATION WITHOUT ABNORMAL FINDINGS: ICD-10-CM

## 2021-06-21 DIAGNOSIS — E78.00 HYPERCHOLESTEROLEMIA: ICD-10-CM

## 2021-06-22 ENCOUNTER — TRANSFERRED RECORDS (OUTPATIENT)
Dept: HEALTH INFORMATION MANAGEMENT | Facility: CLINIC | Age: 56
End: 2021-06-22

## 2021-06-22 RX ORDER — ATORVASTATIN CALCIUM 40 MG/1
TABLET, FILM COATED ORAL
Qty: 90 TABLET | Refills: 1 | Status: SHIPPED | OUTPATIENT
Start: 2021-06-22 | End: 2021-12-13

## 2021-06-22 NOTE — TELEPHONE ENCOUNTER
Prescription approved per [facility] Refill Protocol.    Michelle Roque RN   [facility]

## 2021-07-12 NOTE — PROGRESS NOTES
Assessment & Plan     Type 2 diabetes mellitus without complication, without long-term current use of insulin (H)  Last Hgb A 1 c was 7.1 , went up from 6.3 and we have discussed schedule an appointment with Venusrobyn , he also will start the metformin at 500 mg twice a day with meals      Elevated coronary artery calcium score  Brings in a record of calcium score scan and seems to be in the moderate risk zone , I have recommended seeing a cardiologist about this and I have given him the referral   - Adult Cardiology Eval Referral; Future    Rash and nonspecific skin eruption  We discussed using the Bactroban on the rash for a week , RTC if no improving or worsening.    - mupirocin (BACTROBAN) 2 % external ointment; Apply topically 3 times daily For 7 days    Hypercholesterolemia  Is on a statin and doing well on this     Benign essential hypertension  Will check today , his BP was well controlled today   - Basic metabolic panel  (Ca, Cl, CO2, Creat, Gluc, K, Na, BUN)             BMI:   Estimated body mass index is 37.3 kg/m  as calculated from the following:    Height as of this encounter: 1.829 m (6').    Weight as of this encounter: 124.7 kg (275 lb).   Follow up in 6 weeks on the hgb A1 c sooner if any concerns         Johana Tamez MD  [facility]    Cr cortez is a 56 year old who presents for the following health issues    HPI     1) Pt here to discuss 6/22/21 CT cardiac calcium scoring dual read results done at [facility]. Seems that the scan was in the moderate risk zone , he denies any chest pain , no sob , he does have HTn and Dm type 2 recently diagnosed , he has not seen {Pharm D yet to discuss   He thinks that his glucose was higher because just prior to the test he had to take a prednisone course for wheezing   2) HTN , is conteolled on the meds, he does not  Check at home     Review of Systems   Constitutional, HEENT, cardiovascular, pulmonary, GI, ,  musculoskeletal, neuro, skin, endocrine and psych systems are negative, except as otherwise noted.      Objective    /87 (Patient Position: Sitting, Cuff Size: Adult Large)   Pulse 60   Temp 97.6  F (36.4  C) (Tympanic)   Resp 22   Ht 1.829 m (6')   Wt 124.7 kg (275 lb)   SpO2 97%   BMI 37.30 kg/m    Body mass index is 37.3 kg/m .  Physical Exam   GENERAL: healthy, alert and no distress  EYES: Eyes grossly normal to inspection, PERRL and conjunctivae and sclerae normal  NECK: no adenopathy, no asymmetry, masses, or scars and thyroid normal to palpation  RESP: lungs clear to auscultation - no rales, rhonchi or wheezes  CV: regular rate and rhythm, normal S1 S2, no S3 or S4, no murmur, click or rub, no peripheral edema and peripheral pulses strong  ABDOMEN: soft, nontender, no hepatosplenomegaly, no masses and bowel sounds normal  MS: no gross musculoskeletal defects noted, no edema    Results for orders placed or performed in visit on 07/13/21   Basic metabolic panel  (Ca, Cl, CO2, Creat, Gluc, K, Na, BUN)     Status: Abnormal   Result Value Ref Range    Sodium 138 133 - 144 mmol/L    Potassium 4.1 3.4 - 5.3 mmol/L    Chloride 107 mmol/L    Carbon Dioxide (CO2) 23 20 - 32 mmol/L    Anion Gap 8 3 - 14 mmol/L    Urea Nitrogen 14 7 - 30 mg/dL    Creatinine 1.00 mg/dL    Calcium 9.7 8.5 - 10.1 mg/dL    Glucose 131 (H) 70 - 99 mg/dL    GFR Estimate 85 >60 mL/min/1.73m2

## 2021-07-13 ENCOUNTER — OFFICE VISIT (OUTPATIENT)
Dept: FAMILY MEDICINE | Facility: CLINIC | Age: 56
End: 2021-07-13
Payer: COMMERCIAL

## 2021-07-13 VITALS
HEIGHT: 72 IN | TEMPERATURE: 97.6 F | SYSTOLIC BLOOD PRESSURE: 132 MMHG | RESPIRATION RATE: 22 BRPM | HEART RATE: 60 BPM | WEIGHT: 275 LBS | BODY MASS INDEX: 37.25 KG/M2 | DIASTOLIC BLOOD PRESSURE: 87 MMHG | OXYGEN SATURATION: 97 %

## 2021-07-13 DIAGNOSIS — R93.1 ELEVATED CORONARY ARTERY CALCIUM SCORE: ICD-10-CM

## 2021-07-13 DIAGNOSIS — I10 BENIGN ESSENTIAL HYPERTENSION: ICD-10-CM

## 2021-07-13 DIAGNOSIS — R21 RASH AND NONSPECIFIC SKIN ERUPTION: ICD-10-CM

## 2021-07-13 DIAGNOSIS — E78.00 HYPERCHOLESTEROLEMIA: ICD-10-CM

## 2021-07-13 DIAGNOSIS — E11.9 TYPE 2 DIABETES MELLITUS WITHOUT COMPLICATION, WITHOUT LONG-TERM CURRENT USE OF INSULIN (H): Primary | ICD-10-CM

## 2021-07-13 PROCEDURE — 99214 OFFICE O/P EST MOD 30 MIN: CPT | Performed by: FAMILY MEDICINE

## 2021-07-13 PROCEDURE — 36415 COLL VENOUS BLD VENIPUNCTURE: CPT | Performed by: FAMILY MEDICINE

## 2021-07-13 PROCEDURE — 80048 BASIC METABOLIC PNL TOTAL CA: CPT | Performed by: FAMILY MEDICINE

## 2021-07-13 RX ORDER — MUPIROCIN 20 MG/G
OINTMENT TOPICAL 3 TIMES DAILY
Qty: 30 G | Refills: 0 | Status: SHIPPED | OUTPATIENT
Start: 2021-07-13 | End: 2021-08-24

## 2021-07-13 ASSESSMENT — MIFFLIN-ST. JEOR: SCORE: 2125.39

## 2021-07-14 LAB
ANION GAP SERPL CALCULATED.3IONS-SCNC: 8 MMOL/L (ref 3–14)
BUN SERPL-MCNC: 14 MG/DL (ref 7–30)
CALCIUM SERPL-MCNC: 9.7 MG/DL (ref 8.5–10.1)
CHLORIDE BLD-SCNC: 107 MMOL/L
CO2 SERPL-SCNC: 23 MMOL/L (ref 20–32)
CREAT SERPL-MCNC: 1 MG/DL
GFR SERPL CREATININE-BSD FRML MDRD: 85 ML/MIN/1.73M2
GLUCOSE BLD-MCNC: 131 MG/DL (ref 70–99)
POTASSIUM BLD-SCNC: 4.1 MMOL/L (ref 3.4–5.3)
SODIUM SERPL-SCNC: 138 MMOL/L (ref 133–144)

## 2021-08-16 ENCOUNTER — TELEPHONE (OUTPATIENT)
Dept: FAMILY MEDICINE | Facility: CLINIC | Age: 56
End: 2021-08-16

## 2021-08-16 DIAGNOSIS — E11.9 TYPE 2 DIABETES MELLITUS WITHOUT COMPLICATION, WITHOUT LONG-TERM CURRENT USE OF INSULIN (H): Primary | ICD-10-CM

## 2021-08-16 NOTE — TELEPHONE ENCOUNTER
JS,    Please see message below.  Please address due to LS absence.    Note from 7/13/21 OV:  Type 2 diabetes mellitus without complication, without long-term current use of insulin (H)  Last Hgb A 1 c was 7.1 , went up from 6.3 and we have discussed schedule an appointment with Sherry , he also will start the metformin at 500 mg twice a day with meals      MTM referral order T'd up.  Thanks,  Christina Guo RN

## 2021-08-16 NOTE — TELEPHONE ENCOUNTER
Reason for Call: Request for an order or referral:    Order or referral being requested: diabetic education     Date needed: as soon as possible    Has the patient been seen by the PCP for this problem? YES    Additional comments: Patient states that he was supposed to have a referral placed 7/13    Phone number Patient can be reached at:  Home number on file [phone] (home)    Best Time:  asap    Can we leave a detailed message on this number?  YES    Call taken on 8/16/2021 at 10:00 AM by Julien Alonzo

## 2021-08-18 ENCOUNTER — TELEPHONE (OUTPATIENT)
Dept: FAMILY MEDICINE | Facility: CLINIC | Age: 56
End: 2021-08-18

## 2021-08-24 ENCOUNTER — OFFICE VISIT (OUTPATIENT)
Dept: PHARMACY | Facility: CLINIC | Age: 56
End: 2021-08-24
Attending: PHYSICIAN ASSISTANT
Payer: COMMERCIAL

## 2021-08-24 VITALS — DIASTOLIC BLOOD PRESSURE: 74 MMHG | SYSTOLIC BLOOD PRESSURE: 122 MMHG | BODY MASS INDEX: 36.5 KG/M2 | WEIGHT: 269.1 LBS

## 2021-08-24 DIAGNOSIS — I10 BENIGN ESSENTIAL HYPERTENSION: ICD-10-CM

## 2021-08-24 DIAGNOSIS — E78.2 MIXED HYPERLIPIDEMIA: ICD-10-CM

## 2021-08-24 DIAGNOSIS — R06.2 WHEEZING: ICD-10-CM

## 2021-08-24 DIAGNOSIS — Z23 ENCOUNTER FOR IMMUNIZATION: ICD-10-CM

## 2021-08-24 DIAGNOSIS — E11.9 TYPE 2 DIABETES MELLITUS WITHOUT COMPLICATION, WITHOUT LONG-TERM CURRENT USE OF INSULIN (H): Primary | ICD-10-CM

## 2021-08-24 PROCEDURE — 99605 MTMS BY PHARM NP 15 MIN: CPT | Performed by: PHARMACIST

## 2021-08-24 PROCEDURE — 99607 MTMS BY PHARM ADDL 15 MIN: CPT | Performed by: PHARMACIST

## 2021-08-24 RX ORDER — ASPIRIN 81 MG/1
81 TABLET, CHEWABLE ORAL DAILY
Qty: 100 TABLET | Refills: 0
Start: 2021-08-24

## 2021-08-24 RX ORDER — METFORMIN HCL 500 MG
500 TABLET, EXTENDED RELEASE 24 HR ORAL 2 TIMES DAILY WITH MEALS
Qty: 180 TABLET | Refills: 1 | Status: SHIPPED | OUTPATIENT
Start: 2021-08-24 | End: 2022-03-10

## 2021-08-24 NOTE — PATIENT INSTRUCTIONS
Recommendations from today's MTM visit:                                                      1. We'll get you a pneumonia vaccine today    2. We're going to check your urine for protein - this is a screening lab that we do each year.     3. Finish the metformin you have at home, then switch to metformin ER as this is easier on your stomach. Take this the same way you take metformin today. Metformin is best taken with food.     4. In the future, it would be good to consider increasing your atorvastatin to 80mg daily.     5. Send Nakia a [employer] message with the name of your inhaler.     Follow-up: Nakia will check in with you in 2 weeks over [employer]    It was great to speak with you today.  I value your experience and would be very thankful for your time with providing feedback on our clinic survey. You may receive a survey via email or text message in the next few days.     To schedule another MTM appointment, please call the clinic directly or you may call the MTM scheduling line at [phone] or toll-free at [phone].     My Clinical Pharmacist's contact information:                                                      Please feel free to contact me with any questions or concerns you have.      Nakia Cabrera, Pharm.D., M.B.A., BCACP  MT Pharmacist, [facility]  Pager: [phone]  Email: [email or web address]
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
## 2021-09-04 DIAGNOSIS — R09.82 POST-NASAL DRIP: ICD-10-CM

## 2021-09-07 RX ORDER — FLUTICASONE PROPIONATE 50 MCG
SPRAY, SUSPENSION (ML) NASAL
Qty: 48 G | Refills: 2 | Status: SHIPPED | OUTPATIENT
Start: 2021-09-07

## 2021-09-14 ENCOUNTER — VIRTUAL VISIT (OUTPATIENT)
Dept: SLEEP MEDICINE | Facility: CLINIC | Age: 56
End: 2021-09-14
Payer: COMMERCIAL

## 2021-09-14 DIAGNOSIS — G47.33 OSA (OBSTRUCTIVE SLEEP APNEA): Primary | ICD-10-CM

## 2021-09-14 PROCEDURE — 99203 OFFICE O/P NEW LOW 30 MIN: CPT | Mod: 95 | Performed by: PHYSICIAN ASSISTANT

## 2021-09-14 NOTE — PATIENT INSTRUCTIONS
Watch the following YouTube video to see how to operate the Dreamstation 2: https://youtu.be/[email or web address]    Your sleep apnea treatment may be affected by device recall    Our records show that you may have a Twan Respironics CPAP for the treatment of sleep apnea. Many of these devices have been recalled* by the  for replacement. [facility] recommends:     1) If you are using a Resmed device, continue using the device.  2) If you have a Twan Respironics device, register your device for confirmation of type of device and repair of the device at https://www.FAD ? [email or web address]/sleep/communications/src-update -if you cannot use link, call [phone].  The website will assist you in obtaining the serial number for registration.   3) If you are using a Twan Respironics CPAP or Bilevel PAP device and you do not have immediate breathing, driving or cardiovascular risks without the device, consider stopping use of the device after verification that is has been recalled. Discuss this decision with your medical provider if you are uncertain about your medical risks.  4) If you are not using Respironics CPAP but are using a Respironics advanced device for breathing support (AVAPS, ASV, Bilevel PAP), continue using the device and review 5 and 6 below).     5) If you continue the device, do not include ozone generating  connected to PAP devices.  6) Bacterial filters to reduce exposure to particulates are sometimes cumbersome to use and are not currently recommended by the .    ?       You may also choose discuss with your provider alternative approaches to treatment.      *[employer] vascular solution is voluntarily recalling the below devices due to two (2) issues related to the polyester-based polyurethane (PE-PUR) sound abatement foam used in Twan Continuous and Non-Continuous Ventilators: 1) PE-PUR foam may degrade into particles which may enter the device's the air  pathway and be ingested or inhaled by the user, and 2) the PE-PUR foam may off-gas certain chemicals. The foam degradation may be exacerbated by use of unapproved cleaning methods, such as ozone (see FDA safety communication on use of Ozone ), and off-gassing may occur during initial operation and may possibly continue throughout the device's useful life.   These issues can result in serious injury which can be life-threatening, cause permanent impairment, and/or require medical intervention to preclude permanent impairment. To date, [employer] has received several complaints regarding the presence of black debris/particles within the airpath circuit (extending from the device outlet, humidifier, tubing, and mask). [employer] also has received reports of headache, upper airway irritation, cough, chest pressure and sinus infection. The potential risks of particulate exposure include: Irritation (skin, eye, and respiratory tract), inflammatory response, headache, asthma, adverse effects to other organs (e.g. kidneys and liver) and toxic carcinogenic affects. The potential risks of chemical exposure due to off-gassing include: headache/dizziness, irritation (eyes, nose, respiratory tract, skin), hypersensitivity, nausea/vomiting, toxic and carcinogenic effects. There have been no reports of death as a result of these issues.    Actions to be taken:  Discontinue the use of your device.  Do not continue to use ozone  with the device.     [place] affected devices on the recall website, www.[employer]/SRC-update    i. The website provides current information on the status of the recall and how  to receive permanent corrective action to address the two issues.    ii. The website also provides instructions on how to locate an affected device  Serial Number and will guide users through the registration process.    iii. In the , call [phone] Service Hotline if you cannot visit the website              Your BMI is There is no height or weight on file to calculate BMI.  Weight management is a personal decision.  If you are interested in exploring weight loss strategies, the following discussion covers the approaches that may be successful. Body mass index (BMI) is one way to tell whether you are at a healthy weight, overweight, or obese. It measures your weight in relation to your height.  A BMI of 18.5 to 24.9 is in the healthy range. A person with a BMI of 25 to 29.9 is considered overweight, and someone with a BMI of 30 or greater is considered obese. More than two-thirds of [place] adults are considered overweight or obese.  Being overweight or obese increases the risk for further weight gain. Excess weight may lead to heart disease and diabetes.  Creating and following plans for healthy eating and physical activity may help you improve your health.  Weight control is part of healthy lifestyle and includes exercise, emotional health, and healthy eating habits. Careful eating habits lifelong are the mainstay of weight control. Though there are significant health benefits from weight loss, long-term weight loss with diet alone may be very difficult to achieve- studies show long-term success with dietary management in less than 10% of people. Attaining a healthy weight may be especially difficult to achieve in those with severe obesity. In some cases, medications, devices and surgical management might be considered.  What can you do?  If you are overweight or obese and are interested in methods for weight loss, you should discuss this with your provider.     Consider reducing daily calorie intake by 500 calories.     Keep a food journal.     Avoiding skipping meals, consider cutting portions instead.    Diet combined with exercise helps maintain muscle while optimizing fat loss. Strength training is particularly important for building and maintaining muscle mass. Exercise helps reduce stress, increase  energy, and improves fitness. Increasing exercise without diet control, however, may not burn enough calories to loose weight.       Start walking three days a week 10-20 minutes at a time    Work towards walking thirty minutes five days a week     Eventually, increase the speed of your walking for 1-2 minutes at time    In addition, we recommend that you review healthy lifestyles and methods for weight loss available through the National Institutes of Health patient information sites:  http://win.niddk.nih.gov/publications/index.htm    And look into health and wellness programs that may be available through your health insurance provider, employer, local community center, or rafal club.    Weight management plan: Patient was referred to their PCP to discuss a diet and exercise plan.

## 2021-09-14 NOTE — PROGRESS NOTES
dana is a 56 year old who is being evaluated via a billable video visit.      How would you like to obtain your AVS? MyChart  If the video visit is dropped, the invitation should be resent by: Text to cell phone: [phone]  Will anyone else be joining your video visit? No      Video Start Time: 2:45 PM  Video-Visit Details    Type of service:  Video Visit    Video End Time:3:25 PM    Originating Location (pt. Location): Home    Distant Location (provider location):  [facility] SLEEP [facility]     Platform used for Video Visit: ClipMine    Sleep Consultation:    Date on this visit: 9/14/2021    Martin Anguiano is sent by No ref. provider found for a sleep consultation regarding JOJO.    Primary Physician: Johana Tamez presents to re-establish care for JOJO. His medical history is significant for HTN, CAD, DM 2, and obesity (BMI 38), tobacco abuse.     HST 5/26/2016 (wt 282#)  AHI: 25.7/hr    JOSE E: 12.6/hr   Supine AHI: 53.5/hr  Lateral AHI: 17.4/hr on left and 29.8/hr on right.   Average SpO2: 93%  Lowest Desaturation: 80%. Time Spent Below 89%: 10.4 minutes    His machine is 5 years and is under recall. He did get the bacterial filter. He was diagnosed with asthma about 3 years ago. He has been using SoClean. He is on auto CPAP 8-15 cm. He did forget the machine when he went to the cabin and really slept poorly. He has been losing weight, about 25 pounds.  He uses nasal pillows mask. He does not have mask leak but notices a little leak in the connector hose. He has duct taped the hose. He does not snore with CPAP. He denies dry nose or mouth. He puts a little water in the chamber and it does run empty overnight. He denies condensation in the mask or hose.    The compliance data shows that the patient used the CPAP for 64% of nights for >4 hours.  The 90th% pressure is 12.5 cm.  The average time in large leak is 0.  The average nightly usage is 391 min.  The average AHI is 2.6/hr.      Martin  "goes to sleep at 10:00-11:00 PM during the week. He wakes up at 6:30-7:00 AM to his dogs. He falls asleep in 10-15 minutes.  Martin denies difficulty falling asleep.  He wakes up 2 times a night for 5 minutes before falling back to sleep.  Martin wakes up to go to the bathroom.  On weekends, his sleep schedule is about the same, maybe a little later.  Patient gets an average of 7-8 hours of sleep per night.     Patient does watch TV in bed (30 min) and does not use electronics in bed, worry in bed about anything and read in bed.     Martin does not do shift work.  He works day shifts. He works as a contractor. He lives with his wife. His 2 girls are out of the house now (22 year old twins).     Martin does have a regular bed partner. They never sleep separately.  Patient sleeps on his back. He denies morning headaches, morning confusion. He sometimes has restless legs (maybe a couple days every 2-3 months). Martin denies any bruxism, sleep walking, sleep talking, dream enactment, sleep paralysis, cataplexy and hypnogogic/hypnopompic hallucinations.    Martin has reflux at night and heartburn (a little, improved with CPAP), denies difficulty breathing through his nose, claustrophobia and depression.      Martin describes himself as neither a morning or night person.  He would prefer to go to sleep at 11:00 PM and wake up at 7:00 AM.  Patient's [place] Sleepiness score 8/24 inconsistent with daytime sleepiness.      Martin naps 2 times per week for 30-45 minutes, feels refreshed after naps. He takes rare inadvertant naps, if his wife is watching a boring show.  He denies dozing while driving.  Patient was counseled on the importance of driving while alert, to pull over if drowsy, or nap before getting into the vehicle if sleepy.  He uses no caffeine.     Allergies:    Allergies   Allergen Reactions     Ibuprofen      Other reaction(s): Other - Describe In Comment Field  Uri type of symptoms; \"watery eyes\". Gel caps are  " OK  Eyes water,runny nose     Lisinopril Cough       Medications:    Current Outpatient Medications   Medication Sig Dispense Refill     albuterol (PROAIR HFA/PROVENTIL HFA/VENTOLIN HFA) 108 (90 Base) MCG/ACT inhaler INHALE 2 PUFFS BY MOUTH EVERY 2 HOURS AS NEEDED FOR SHORTNESS OF BREATH OR DIFFICULTY BREATHING 18 g 0     aspirin (ASA) 81 MG chewable tablet Take 1 tablet (81 mg) by mouth daily 100 tablet 0     atorvastatin (LIPITOR) 40 MG tablet TAKE 1 TABLET(40 MG) BY MOUTH DAILY 90 tablet 1     fluticasone (FLONASE) 50 MCG/ACT nasal spray SHAKE LIQUID AND USE 1 SPRAY IN EACH NOSTRIL DAILY 48 g 2     ipratropium - albuterol 0.5 mg/2.5 mg/3 mL (DUONEB) 0.5-2.5 (3) MG/3ML neb solution USE 1 VIAL VIA NEBULIZER EVERY 4 HOURS AS NEEDED FOR SHORTNESS OF BREATH, DYSPNEA,OR WHEEZING 360 mL 2     losartan (COZAAR) 100 MG tablet Take 1 tablet (100 mg) by mouth daily 90 tablet 1     metFORMIN (GLUCOPHAGE-XR) 500 MG 24 hr tablet Take 1 tablet (500 mg) by mouth 2 times daily (with meals) 180 tablet 1     Multiple Vitamin (MULTIVITAMIN ADULT PO) Take 1 tablet by mouth daily         Problem List:  Patient Active Problem List    Diagnosis Date Noted     Diabetes mellitus, type 2 (H) 07/13/2021     Priority: Medium     Elevated coronary artery calcium score 07/13/2021     Priority: Medium     Elevated fasting glucose 05/25/2021     Priority: Medium     Obesity (BMI 35.0-39.9) with comorbidity (H) 10/09/2018     Priority: Medium     Cellulitis and abscess of leg-healing and closing  10/25/2016     Priority: Medium     Family history of diabetes mellitus 10/21/2016     Priority: Medium     Glucose intolerance (impaired glucose tolerance) 10/21/2016     Priority: Medium     Non morbid obesity due to excess calories 10/21/2016     Priority: Medium     Screening for prostate cancer 10/21/2016     Priority: Medium     Mixed hyperlipidemia 10/21/2016     Priority: Medium     Tobacco use disorder 19-48y/o @ 1.5ppd for 15 yrs then  1ppd=31-32 pk yr hx 10/21/2016     Priority: Medium     Benign essential hypertension 2016     Priority: Medium     JOJO (obstructive sleep apnea) 2016     Priority: Medium     Chronic coronary artery disease 2014     Priority: Medium     Overview:   By 2014 heart scan.       Impotence of organic origin 2013     Priority: Medium     History of vasectomy 2013     Priority: Medium     Vitamin D deficiency 2011     Priority: Medium     Family history of coronary artery disease 2011     Priority: Medium     Gastroesophageal reflux disease 2009     Priority: Medium     Hypercholesterolemia 2009     Priority: Medium        Past Medical/Surgical History:  Past Medical History:   Diagnosis Date     HTN (hypertension)      Hypercholesteremia      JOJO (obstructive sleep apnea)      Pre-diabetes      Past Surgical History:   Procedure Laterality Date     NO HISTORY OF SURGERY         Social History:  Social History     Socioeconomic History     Marital status: Single     Spouse name: Not on file     Number of children: Not on file     Years of education: Not on file     Highest education level: Not on file   Occupational History     Not on file   Tobacco Use     Smoking status: Former Smoker     Packs/day: 1.00     Years: 20.00     Pack years: 20.00     Types: Cigarettes     Quit date: 10/4/2016     Years since quittin.9     Smokeless tobacco: Never Used     Tobacco comment: last 10 years were off and on, about 2016   Vaping Use     Vaping Use: Never used   Substance and Sexual Activity     Alcohol use: Yes     Alcohol/week: 0.0 standard drinks     Comment: 1-2 times per week, 2 drinks     Drug use: No     Sexual activity: Yes     Partners: Female   Other Topics Concern     Parent/sibling w/ CABG, MI or angioplasty before 65F 55M? Yes   Social History Narrative     Not on file     Social Determinants of Health     Financial Resource Strain:      Difficulty of Paying  Living Expenses:    Food Insecurity:      Worried About Running Out of Food in the Last Year:      Ran Out of Food in the Last Year:    Transportation Needs:      Lack of Transportation (Medical):      Lack of Transportation (Non-Medical):    Physical Activity:      Days of Exercise per Week:      Minutes of Exercise per Session:    Stress:      Feeling of Stress :    Social Connections:      Frequency of Communication with Friends and Family:      Frequency of Social Gatherings with Friends and Family:      Attends Confucianist Services:      Active Member of Clubs or Organizations:      Attends Club or Organization Meetings:      Marital Status:    Intimate Partner Violence:      Fear of Current or Ex-Partner:      Emotionally Abused:      Physically Abused:      Sexually Abused:        Family History:  Family History   Problem Relation Age of Onset     Diabetes Mother      Diabetes Brother      Hypertension Father      Hyperlipidemia Father      Coronary Artery Disease No family hx of      Cerebrovascular Disease No family hx of      Breast Cancer No family hx of      Colon Cancer No family hx of      Prostate Cancer No family hx of      Other Cancer No family hx of      Depression No family hx of      Anxiety Disorder No family hx of      Mental Illness No family hx of      Substance Abuse No family hx of      Anesthesia Reaction No family hx of      Asthma No family hx of      Osteoporosis No family hx of      Genetic Disorder No family hx of      Thyroid Disease No family hx of      Obesity No family hx of      Unknown/Adopted No family hx of        Review of Systems:  A complete review of systems reviewed by me is negative with the exeption of what has been mentioned in the history of present illness.  CONSTITUTIONAL: NEGATIVE for weight gain/loss, fever, chills, sweats or night sweats, drug allergies.  EYES: NEGATIVE for changes in vision, blind spots, double vision.  ENT: NEGATIVE for ear pain, sore throat,  "sinus pain, post-nasal drip, bloody nose  ENT:  POSITIVE for  runny nose  CARDIAC: NEGATIVE for fast heartbeats or fluttering in chest, chest pain or pressure, breathlessness when lying flat, swollen legs or swollen feet.  NEUROLOGIC: NEGATIVE weakness or numbness in the arms or legs.  NEUROLOGIC:  POSITIVE for  Headaches-a couple times in the last few weeks  PULMONARY: NEGATIVE SOB at rest, dry cough, productive cough, coughing up blood, wheezing or whistling when breathing.    PULMONARY:  POSITIVE for  SOB with activity-mild, has not been working out in 6 months  GASTROINTESTINAL: NEGATIVE for nausea or vomitting, loose or watery stools, fat or grease in stools, constipation, abdominal pain, bowel movements black in color or blood noted.  GENITOURINARY: NEGATIVE for pain during urination, blood in urine, urinating more frequently than usual, irregular menstrual periods.  MUSCULOSKELETAL: NEGATIVE for muscle pain, bone or joint pain, swollen joints.    Physical Examination:  Patient reported vitals: Ht: 6'0\". Wt: 260 pounds. BMI 35.3.        [place] Total Score 5/3/2016   Total score - [place] 8       JENNIFER Total Score: 4 (09/14/21 1301)    GENERAL APPEARANCE: healthy, alert, no distress and cooperative  EYES: Eyes grossly normal to inspection  HENT: oropharynx crowded and tongue base enlarged  NECK: no asymmetry, masses, or scars  RESP: no respiratory distress, cough or wheeze  Mallampati Class: IV.  Tonsillar Stage: not visualized.    Impression/Plan:    (G47.33) JOJO (obstructive sleep apnea)  (primary encounter diagnosis)  Comment: Jose presents to get a prescription for a replacement auto CPAP. His machine is over 5 years old and has been recalled by [employer]. He has been using SoClean and is now using an in line filter to protect against particles getting into the airflow. He has developed asthma in the last few years. He otherwise continues to use CPAP regularly and does sleep much better with it. He has lost " about 25 pounds but his pressure is still in the middle of the pressure range 8-15 cm, so I do not think he needs a pressure adjustment at this time.    Plan: Comprehensive DME        Order placed for a replacement auto CPAP 8-15 cm and new supplies. We talked about the recall and how to operate the Dreamstation 2. He was reminded to change the in line filter monthly. He was advised to avoid using SoClean on the motor part of the machine. He was congratulated on his weight loss and encouraged to continue working at it. If he starts feeling the pressures are too high or having more issues with mask leak, let me know and we can see if the pressure needs to be adjusted.       He will follow up with me in approximately 2 years unless his insurance requires a follow up 2 months after getting a replacement machine.          44 minutes were spent on the date of the encounter doing chart review, history and exam, documentation and further activities as noted above.     CC: No ref. provider found

## 2021-09-15 ENCOUNTER — TELEPHONE (OUTPATIENT)
Dept: SLEEP MEDICINE | Facility: CLINIC | Age: 56
End: 2021-09-15

## 2021-10-03 ENCOUNTER — HEALTH MAINTENANCE LETTER (OUTPATIENT)
Age: 56
End: 2021-10-03

## 2021-12-13 DIAGNOSIS — R05.9 COUGH: ICD-10-CM

## 2021-12-14 RX ORDER — ALBUTEROL SULFATE 90 UG/1
AEROSOL, METERED RESPIRATORY (INHALATION)
Qty: 6.7 G | Refills: 0 | Status: SHIPPED | OUTPATIENT
Start: 2021-12-14 | End: 2022-01-17

## 2022-01-04 ENCOUNTER — TELEPHONE (OUTPATIENT)
Dept: PHARMACY | Facility: CLINIC | Age: 57
End: 2022-01-04
Payer: COMMERCIAL

## 2022-01-04 NOTE — TELEPHONE ENCOUNTER
Called Jose to schedule f/up MTM appt. Patient was busy when I spoke to him and prefers to schedule his appt using the online application.    Dipika Ford, PharmD   Medication Therapy Management Resident  Pager: [phone]

## 2022-01-23 ENCOUNTER — HEALTH MAINTENANCE LETTER (OUTPATIENT)
Age: 57
End: 2022-01-23

## 2022-03-01 ENCOUNTER — TRANSFERRED RECORDS (OUTPATIENT)
Dept: HEALTH INFORMATION MANAGEMENT | Facility: CLINIC | Age: 57
End: 2022-03-01
Payer: COMMERCIAL

## 2022-03-15 DIAGNOSIS — E78.00 HYPERCHOLESTEROLEMIA: ICD-10-CM

## 2022-03-15 DIAGNOSIS — Z00.00 ENCOUNTER FOR ROUTINE ADULT HEALTH EXAMINATION WITHOUT ABNORMAL FINDINGS: ICD-10-CM

## 2022-03-15 RX ORDER — ATORVASTATIN CALCIUM 40 MG/1
TABLET, FILM COATED ORAL
Start: 2022-03-15

## 2022-03-15 RX ORDER — ATORVASTATIN CALCIUM 40 MG/1
TABLET, FILM COATED ORAL
Qty: 30 TABLET | Refills: 0 | Status: SHIPPED | OUTPATIENT
Start: 2022-03-15 | End: 2022-04-13

## 2022-03-20 ENCOUNTER — HEALTH MAINTENANCE LETTER (OUTPATIENT)
Age: 57
End: 2022-03-20

## 2022-04-12 NOTE — PROGRESS NOTES
SUBJECTIVE:   CC: Martin Anguiano is an 56 year old male who presents for preventative health visit.       Patient has been advised of split billing requirements and indicates understanding: Yes  Healthy Habits:     Getting at least 3 servings of Calcium per day:  Yes    Bi-annual eye exam:  NO    Dental care twice a year:  Yes    Sleep apnea or symptoms of sleep apnea:  Sleep apnea    Diet:  Diabetic    Frequency of exercise:  2-3 days/week    Duration of exercise:  15-30 minutes    Taking medications regularly:  Yes    Medication side effects:  None    PHQ-2 Total Score: 0    Additional concerns today:  Yes  1) status post diverticulitis and perforated bowel as a complication back in February was on Abx IV four days in the hospital and ten days oral ABx   2) HTN -   3) left wrist pain , on the right too , uses the CBD cream helps some   4) asthma is now on the maintenance inhaler daily which has helped a lot           Today's PHQ-2 Score:   PHQ-2 (  [employer]) 2022   Q1: Little interest or pleasure in doing things 0   Q2: Feeling down, depressed or hopeless 0   PHQ-2 Score 0   PHQ-2 Total Score (12-17 Years)- Positive if 3 or more points; Administer PHQ-A if positive -   Q1: Little interest or pleasure in doing things Not at all   Q2: Feeling down, depressed or hopeless Not at all   PHQ-2 Score 0       Abuse: Current or Past(Physical, Sexual or Emotional)- No  Do you feel safe in your environment? Yes        Social History     Tobacco Use     Smoking status: Former Smoker     Packs/day: 1.00     Years: 20.00     Pack years: 20.00     Types: Cigarettes     Quit date: 10/4/2016     Years since quittin.5     Smokeless tobacco: Never Used     Tobacco comment: last 10 years were off and on, about 2016   Substance Use Topics     Alcohol use: Yes     Alcohol/week: 0.0 standard drinks     Comment: 1-2 times per week, 2 drinks     If you drink alcohol do you typically have >3 drinks per day or >7 drinks per week?  No    Alcohol Use 2022   Prescreen: >3 drinks/day or >7 drinks/week? No   Prescreen: >3 drinks/day or >7 drinks/week? -   No flowsheet data found.    Last PSA:   PSA   Date Value Ref Range Status   10/21/2016 0.61 0 - 4 ug/L Final       Reviewed orders with patient. Reviewed health maintenance and updated orders accordingly - Yes  Lab work is in process  Labs reviewed in EPIC  BP Readings from Last 3 Encounters:   22 126/75   21 122/74   21 132/87    Wt Readings from Last 3 Encounters:   22 119.1 kg (262 lb 8 oz)   21 122.1 kg (269 lb 1.6 oz)   21 124.7 kg (275 lb)                  Patient Active Problem List   Diagnosis     Benign essential hypertension     JOJO (obstructive sleep apnea)     Family history of diabetes mellitus     Glucose intolerance (impaired glucose tolerance)     Non morbid obesity due to excess calories     Screening for prostate cancer     Mixed hyperlipidemia     Tobacco use disorder 19-48y/o @ 1.5ppd for 15 yrs then 1ppd=31-32 pk yr hx     Chronic coronary artery disease     Impotence of organic origin     Family history of coronary artery disease     Gastroesophageal reflux disease     History of vasectomy     Hypercholesterolemia     Vitamin D deficiency     Cellulitis and abscess of leg-healing and closing      Obesity (BMI 35.0-39.9) with comorbidity (H)     Elevated fasting glucose     Diabetes mellitus, type 2 (H)     Elevated coronary artery calcium score     Past Surgical History:   Procedure Laterality Date     NO HISTORY OF SURGERY         Social History     Tobacco Use     Smoking status: Former Smoker     Packs/day: 1.00     Years: 20.00     Pack years: 20.00     Types: Cigarettes     Quit date: 10/4/2016     Years since quittin.5     Smokeless tobacco: Never Used     Tobacco comment: last 10 years were off and on, about 2016   Substance Use Topics     Alcohol use: Yes     Alcohol/week: 0.0 standard drinks     Comment: 1-2 times per  week, 2 drinks     Family History   Problem Relation Age of Onset     Diabetes Mother      Diabetes Brother      Hypertension Father      Hyperlipidemia Father      Coronary Artery Disease No family hx of      Cerebrovascular Disease No family hx of      Breast Cancer No family hx of      Colon Cancer No family hx of      Prostate Cancer No family hx of      Other Cancer No family hx of      Depression No family hx of      Anxiety Disorder No family hx of      Mental Illness No family hx of      Substance Abuse No family hx of      Anesthesia Reaction No family hx of      Asthma No family hx of      Osteoporosis No family hx of      Genetic Disorder No family hx of      Thyroid Disease No family hx of      Obesity No family hx of      Unknown/Adopted No family hx of          Current Outpatient Medications   Medication Sig Dispense Refill     albuterol (PROAIR HFA/PROVENTIL HFA/VENTOLIN HFA) 108 (90 Base) MCG/ACT inhaler Inhale 2 puffs into the lungs every 6 hours as needed for shortness of breath / dyspnea or wheezing 6.7 g 4     aspirin (ASA) 81 MG chewable tablet Take 1 tablet (81 mg) by mouth daily 100 tablet 0     atorvastatin (LIPITOR) 40 MG tablet Take 1 tablet (40 mg) by mouth daily 90 tablet 1     fluticasone (FLONASE) 50 MCG/ACT nasal spray SHAKE LIQUID AND USE 1 SPRAY IN EACH NOSTRIL DAILY 48 g 2     losartan (COZAAR) 100 MG tablet Take 1 tablet (100 mg) by mouth daily 90 tablet 1     metFORMIN (GLUCOPHAGE-XR) 500 MG 24 hr tablet TAKE 1 TABLET(500 MG) BY MOUTH TWICE DAILY WITH MEALS 180 tablet 1     Fluticasone-Salmeterol (ADVAIR HFA IN)        ipratropium - albuterol 0.5 mg/2.5 mg/3 mL (DUONEB) 0.5-2.5 (3) MG/3ML neb solution USE 1 VIAL VIA NEBULIZER EVERY 4 HOURS AS NEEDED FOR SHORTNESS OF BREATH, DYSPNEA,OR WHEEZING 360 mL 2     Multiple Vitamin (MULTIVITAMIN ADULT PO) Take 1 tablet by mouth daily       Allergies   Allergen Reactions     Ibuprofen      Other reaction(s): Other - Describe In Comment  "Field  Uri type of symptoms; \"watery eyes\". Gel caps are  OK  Eyes water,runny nose     Lisinopril Cough     Recent Labs   Lab Test 04/13/22  0935 07/13/21  0917 05/25/21  1043 01/15/21  1125 11/26/19  1028 08/19/19  0935   A1C 5.6  --  7.1* 6.3* 5.9*  --    LDL 45  --   --  66  --  73   HDL 43  --   --  50  --  48   TRIG 113  --   --  114  --  82   ALT 51  --   --  56 62  --    CR 0.88 1.00 0.90 0.94 1.03  --    GFRESTIMATED >90 85 >90 >90 83  --    GFRESTBLACK  --   --  >90 >90 >90  --    POTASSIUM 4.3 4.1 4.5 4.4 4.3  --         Reviewed and updated as needed this visit by clinical staff                    Reviewed and updated as needed this visit by Provider                   Past Medical History:   Diagnosis Date     HTN (hypertension)      Hypercholesteremia      JOJO (obstructive sleep apnea)      Pre-diabetes       Past Surgical History:   Procedure Laterality Date     NO HISTORY OF SURGERY         Review of Systems  CONSTITUTIONAL: NEGATIVE for fever, chills, change in weight  INTEGUMENTARY/SKIN: NEGATIVE for worrisome rashes, moles or lesions  EYES: NEGATIVE for vision changes or irritation  ENT: NEGATIVE for ear, mouth and throat problems  RESP: NEGATIVE for significant cough or SOB  CV: NEGATIVE for chest pain, palpitations or peripheral edema  GI: NEGATIVE for nausea, abdominal pain, heartburn, or change in bowel habits   male: negative for dysuria, hematuria, decreased urinary stream, erectile dysfunction, urethral discharge  MUSCULOSKELETAL: NEGATIVE for significant arthralgias or myalgia  NEURO: NEGATIVE for weakness, dizziness or paresthesias  PSYCHIATRIC: NEGATIVE for changes in mood or affect    OBJECTIVE:   Ht 1.801 m (5' 10.9\")   Wt 119.1 kg (262 lb 8 oz)   BMI 36.71 kg/m      Physical Exam  GENERAL: healthy, alert and no distress  EYES: Eyes grossly normal to inspection, PERRL and conjunctivae and sclerae normal  HENT: ear canals and TM's normal, nose and mouth without ulcers or " lesions  NECK: no adenopathy, no asymmetry, masses, or scars and thyroid normal to palpation  RESP: lungs clear to auscultation - no rales, rhonchi or wheezes  CV: regular rate and rhythm, normal S1 S2, no S3 or S4, no murmur, click or rub, no peripheral edema and peripheral pulses strong  ABDOMEN: soft, nontender, no hepatosplenomegaly, no masses and bowel sounds normal  MS: no gross musculoskeletal defects noted, no edema  SKIN: no suspicious lesions or rashes  NEURO: Normal strength and tone, mentation intact and speech normal  PSYCH: mentation appears normal, affect normal/bright    Diagnostic Test Results:  Labs reviewed in Epic    ASSESSMENT/PLAN:   (Z00.00) Routine general medical examination at a health care facility  (primary encounter diagnosis)  Comment: will check fasting labs   Plan: Vitamin D Deficiency        See below     (E66.01) Morbid obesity (H)  Comment: pt has lost about 30 lbs since 2021 , also is eating healthier now and more exercise   Plan: encouraged to continue on the healthier path     (E11.9) Type 2 diabetes mellitus without complication, without long-term current use of insulin (H)  Comment: referral given for the eye specialist , also continue same, will check labs today   Plan: Adult Eye Referral, Hemoglobin A1c, metFORMIN         (GLUCOPHAGE-XR) 500 MG 24 hr tablet        He is only taking metformin 500mg twice a day but since the hgb a 1 c is only 5.6 an dhe has lost weight , can go down to once a day of the metformin     (I10) Benign essential hypertension  Comment: losartan (COZAAR) 100 MG tablet          seems that his BP is well controlled on the current med  Plan: losartan 100 mg daily , no side effects    (E78.00) Hypercholesterolemia  Comment: will check labs  Fasting today   Plan: Lipid panel reflex to direct LDL Fasting,         Comprehensive metabolic panel (BMP + Alb, Alk         Phos, ALT, AST, Total. Bili, TP), atorvastatin         (LIPITOR) 40 MG tablet         "Refilled meds     (M25.532) Left wrist pain  Comment: discussed referral for ortho   Plan: Orthopedic  Referral        As above , also ice , rest , Tylenol for pain       (R05.9) Cough  Comment: refilled , doing much better now   Plan: albuterol (PROAIR HFA/PROVENTIL HFA/VENTOLIN         HFA) 108 (90 Base) MCG/ACT inhaler        As above     (  (Z23) Encounter for immunization  Comment: got his pneumonia vaccine today   Plan: Pneumococcal vaccine 23 valent PPSV23          (Pneumovax) [80310]              Patient has been advised of split billing requirements and indicates understanding: Yes    COUNSELING:   Reviewed preventive health counseling, as reflected in patient instructions    Estimated body mass index is 36.71 kg/m  as calculated from the following:    Height as of this encounter: 1.801 m (5' 10.9\").    Weight as of this encounter: 119.1 kg (262 lb 8 oz).         He reports that he quit smoking about 5 years ago. His smoking use included cigarettes. He has a 20.00 pack-year smoking history. He has never used smokeless tobacco.      Counseling Resources:  ATP IV Guidelines  Pooled Cohorts Equation Calculator  FRAX Risk Assessment  ICSI Preventive Guidelines  Dietary Guidelines for Americans, 2010  USDA's MyPlate  ASA Prophylaxis  Lung CA Screening    Johana Tamez MD  [facility] [place]  "

## 2022-04-13 ENCOUNTER — OFFICE VISIT (OUTPATIENT)
Dept: FAMILY MEDICINE | Facility: CLINIC | Age: 57
End: 2022-04-13
Payer: COMMERCIAL

## 2022-04-13 VITALS
BODY MASS INDEX: 36.75 KG/M2 | TEMPERATURE: 97.3 F | OXYGEN SATURATION: 97 % | HEIGHT: 71 IN | WEIGHT: 262.5 LBS | HEART RATE: 60 BPM | SYSTOLIC BLOOD PRESSURE: 126 MMHG | DIASTOLIC BLOOD PRESSURE: 75 MMHG

## 2022-04-13 DIAGNOSIS — E66.01 MORBID OBESITY (H): ICD-10-CM

## 2022-04-13 DIAGNOSIS — R05.9 COUGH: ICD-10-CM

## 2022-04-13 DIAGNOSIS — I10 BENIGN ESSENTIAL HYPERTENSION: ICD-10-CM

## 2022-04-13 DIAGNOSIS — Z00.00 ROUTINE GENERAL MEDICAL EXAMINATION AT A HEALTH CARE FACILITY: Primary | ICD-10-CM

## 2022-04-13 DIAGNOSIS — E78.00 HYPERCHOLESTEROLEMIA: ICD-10-CM

## 2022-04-13 DIAGNOSIS — E11.9 TYPE 2 DIABETES MELLITUS WITHOUT COMPLICATION, WITHOUT LONG-TERM CURRENT USE OF INSULIN (H): ICD-10-CM

## 2022-04-13 DIAGNOSIS — Z23 ENCOUNTER FOR IMMUNIZATION: ICD-10-CM

## 2022-04-13 DIAGNOSIS — M25.532 LEFT WRIST PAIN: ICD-10-CM

## 2022-04-13 LAB
ALBUMIN SERPL-MCNC: 4.2 G/DL (ref 3.4–5)
ALP SERPL-CCNC: 86 U/L (ref 40–150)
ALT SERPL W P-5'-P-CCNC: 51 U/L (ref 0–70)
ANION GAP SERPL CALCULATED.3IONS-SCNC: 7 MMOL/L (ref 3–14)
AST SERPL W P-5'-P-CCNC: 28 U/L (ref 0–45)
BILIRUB SERPL-MCNC: 0.8 MG/DL (ref 0.2–1.3)
BUN SERPL-MCNC: 16 MG/DL (ref 7–30)
CALCIUM SERPL-MCNC: 9.6 MG/DL (ref 8.5–10.1)
CHLORIDE BLD-SCNC: 106 MMOL/L (ref 94–109)
CHOLEST SERPL-MCNC: 111 MG/DL
CO2 SERPL-SCNC: 25 MMOL/L (ref 20–32)
CREAT SERPL-MCNC: 0.88 MG/DL (ref 0.66–1.25)
DEPRECATED CALCIDIOL+CALCIFEROL SERPL-MC: 32 UG/L (ref 20–75)
FASTING STATUS PATIENT QL REPORTED: YES
GFR SERPL CREATININE-BSD FRML MDRD: >90 ML/MIN/1.73M2
GLUCOSE BLD-MCNC: 126 MG/DL (ref 70–99)
HBA1C MFR BLD: 5.6 % (ref 0–5.6)
HDLC SERPL-MCNC: 43 MG/DL
LDLC SERPL CALC-MCNC: 45 MG/DL
NONHDLC SERPL-MCNC: 68 MG/DL
POTASSIUM BLD-SCNC: 4.3 MMOL/L (ref 3.4–5.3)
PROT SERPL-MCNC: 8.2 G/DL (ref 6.8–8.8)
SODIUM SERPL-SCNC: 138 MMOL/L (ref 133–144)
TRIGL SERPL-MCNC: 113 MG/DL

## 2022-04-13 PROCEDURE — 90732 PPSV23 VACC 2 YRS+ SUBQ/IM: CPT | Performed by: FAMILY MEDICINE

## 2022-04-13 PROCEDURE — 82306 VITAMIN D 25 HYDROXY: CPT | Performed by: FAMILY MEDICINE

## 2022-04-13 PROCEDURE — 90471 IMMUNIZATION ADMIN: CPT | Performed by: FAMILY MEDICINE

## 2022-04-13 PROCEDURE — 99214 OFFICE O/P EST MOD 30 MIN: CPT | Mod: 25 | Performed by: FAMILY MEDICINE

## 2022-04-13 PROCEDURE — 36415 COLL VENOUS BLD VENIPUNCTURE: CPT | Performed by: FAMILY MEDICINE

## 2022-04-13 PROCEDURE — 80061 LIPID PANEL: CPT | Performed by: FAMILY MEDICINE

## 2022-04-13 PROCEDURE — 83036 HEMOGLOBIN GLYCOSYLATED A1C: CPT | Performed by: FAMILY MEDICINE

## 2022-04-13 PROCEDURE — 80053 COMPREHEN METABOLIC PANEL: CPT | Performed by: FAMILY MEDICINE

## 2022-04-13 PROCEDURE — 99396 PREV VISIT EST AGE 40-64: CPT | Mod: 25 | Performed by: FAMILY MEDICINE

## 2022-04-13 RX ORDER — METFORMIN HCL 500 MG
TABLET, EXTENDED RELEASE 24 HR ORAL
Qty: 180 TABLET | Refills: 1 | Status: SHIPPED | OUTPATIENT
Start: 2022-04-13 | End: 2022-04-14

## 2022-04-13 RX ORDER — ATORVASTATIN CALCIUM 40 MG/1
40 TABLET, FILM COATED ORAL DAILY
Qty: 90 TABLET | Refills: 1 | Status: SHIPPED | OUTPATIENT
Start: 2022-04-13 | End: 2022-10-06

## 2022-04-13 RX ORDER — LOSARTAN POTASSIUM 100 MG/1
100 TABLET ORAL DAILY
Qty: 90 TABLET | Refills: 1 | Status: SHIPPED | OUTPATIENT
Start: 2022-04-13 | End: 2022-10-21

## 2022-04-13 RX ORDER — ALBUTEROL SULFATE 90 UG/1
2 AEROSOL, METERED RESPIRATORY (INHALATION) EVERY 6 HOURS PRN
Qty: 6.7 G | Refills: 4 | Status: SHIPPED | OUTPATIENT
Start: 2022-04-13 | End: 2022-08-31

## 2022-04-13 ASSESSMENT — ENCOUNTER SYMPTOMS
DIZZINESS: 0
SHORTNESS OF BREATH: 0
FEVER: 0
COUGH: 0
CONSTIPATION: 0
JOINT SWELLING: 0
WEAKNESS: 0
HEMATURIA: 0
HEMATOCHEZIA: 0
FREQUENCY: 0
HEADACHES: 0
PARESTHESIAS: 0
EYE PAIN: 0
CHILLS: 0
DYSURIA: 0
NERVOUS/ANXIOUS: 0
SORE THROAT: 0
DIARRHEA: 0
MYALGIAS: 0
ARTHRALGIAS: 0
HEARTBURN: 0
PALPITATIONS: 0
NAUSEA: 0
ABDOMINAL PAIN: 1

## 2022-04-14 RX ORDER — METFORMIN HCL 500 MG
TABLET, EXTENDED RELEASE 24 HR ORAL
Qty: 90 TABLET | Refills: 1 | Status: SHIPPED | OUTPATIENT
Start: 2022-04-14 | End: 2022-12-02

## 2022-04-25 ENCOUNTER — OFFICE VISIT (OUTPATIENT)
Dept: OPHTHALMOLOGY | Facility: CLINIC | Age: 57
End: 2022-04-25
Payer: COMMERCIAL

## 2022-04-25 DIAGNOSIS — H52.4 PRESBYOPIA: ICD-10-CM

## 2022-04-25 DIAGNOSIS — R68.89 SUSPECTED GLAUCOMA OF BOTH EYES: Primary | ICD-10-CM

## 2022-04-25 DIAGNOSIS — E11.9 TYPE 2 DIABETES MELLITUS WITHOUT COMPLICATION, WITHOUT LONG-TERM CURRENT USE OF INSULIN (H): ICD-10-CM

## 2022-04-25 PROCEDURE — 92004 COMPRE OPH EXAM NEW PT 1/>: CPT | Performed by: OPHTHALMOLOGY

## 2022-04-25 PROCEDURE — 92015 DETERMINE REFRACTIVE STATE: CPT | Performed by: OPHTHALMOLOGY

## 2022-04-25 PROCEDURE — 92133 CPTRZD OPH DX IMG PST SGM ON: CPT | Performed by: OPHTHALMOLOGY

## 2022-04-25 ASSESSMENT — CUP TO DISC RATIO
OD_RATIO: 0.6
OS_RATIO: 0.4

## 2022-04-25 ASSESSMENT — VISUAL ACUITY
OD_SC+: +3
OD_SC: 20/20
METHOD: SNELLEN - LINEAR
OS_SC: 20/20

## 2022-04-25 ASSESSMENT — TONOMETRY
OS_IOP_MMHG: 20
OD_IOP_MMHG: 21
IOP_METHOD: ICARE

## 2022-04-25 ASSESSMENT — CONF VISUAL FIELD
METHOD: COUNTING FINGERS
OS_NORMAL: 1
OD_NORMAL: 1

## 2022-04-25 ASSESSMENT — SLIT LAMP EXAM - LIDS
COMMENTS: 1+ BLEPHARITIS
COMMENTS: 1+ BLEPHARITIS

## 2022-04-25 ASSESSMENT — REFRACTION_MANIFEST
OD_SPHERE: PLANO
OD_CYLINDER: SPHERE
OS_CYLINDER: SPHERE
OS_ADD: +2.50
OS_SPHERE: PLANO
OD_ADD: +2.50

## 2022-04-25 NOTE — PROGRESS NOTES
HPI     Diabetic Eye Exam     Diabetes characteristics include Type 2.              Comments     Patient here for diabetic eye exam. Patient has never had a full eye exam. Uses cheaters (+2.50 +3.00) as needed.   No Pain, No Flashes, No Floaters.       DM2  Lab Results       Component                Value               Date                       A1C                      5.6                 04/13/2022                 A1C                      7.1                 05/25/2021                 A1C                      6.3                 01/15/2021                 A1C                      5.9                 11/26/2019                 A1C                      6.1                 10/09/2018                 A1C                      6.3                 05/08/2018               Youngest of 9, multiple sisters with glaucoma  Mom with glaucoma            Last edited by Zachary Lynn MD on 4/25/2022  9:22 AM. (History)         Review of systems for the eyes was negative other than the pertinent positives/negatives listed in the HPI.      Assessment & Plan    HPI:  Martin Anguiano is a 56 year old male who presents for annual dilated diabetic eye exam. Recently diagnosed 2021. No complaints. Uses OTC readers for near work    POHx: presbyopia  PMHx: T2DM, JOJO, HLD  Current Medications: albuterol (PROAIR HFA/PROVENTIL HFA/VENTOLIN HFA) 108 (90 Base) MCG/ACT inhaler, Inhale 2 puffs into the lungs every 6 hours as needed for shortness of breath / dyspnea or wheezing  aspirin (ASA) 81 MG chewable tablet, Take 1 tablet (81 mg) by mouth daily  atorvastatin (LIPITOR) 40 MG tablet, Take 1 tablet (40 mg) by mouth daily  fluticasone (FLONASE) 50 MCG/ACT nasal spray, SHAKE LIQUID AND USE 1 SPRAY IN EACH NOSTRIL DAILY  Fluticasone-Salmeterol (ADVAIR HFA IN),   ipratropium - albuterol 0.5 mg/2.5 mg/3 mL (DUONEB) 0.5-2.5 (3) MG/3ML neb solution, USE 1 VIAL VIA NEBULIZER EVERY 4 HOURS AS NEEDED FOR SHORTNESS OF BREATH, DYSPNEA,OR  WHEEZING  losartan (COZAAR) 100 MG tablet, Take 1 tablet (100 mg) by mouth daily  metFORMIN (GLUCOPHAGE-XR) 500 MG 24 hr tablet, TAKE 1 TABLET(500 MG) BY MOUTH once  DAILY WITH MEALS  Multiple Vitamin (MULTIVITAMIN ADULT PO), Take 1 tablet by mouth daily    No current facility-administered medications on file prior to visit.    FHx: glaucoma-multiple sisters and mother  PSHx: denies history of ocular surgeries       Current Eye Medications:      Assessment & Plan:  (E11.9) Type 2 diabetes mellitus without complication, without long-term current use of insulin (H)  Diagnosed 2021  Most recent HgBA1c 5.6 on 4/13/22  No background diabetic retinopathy or neovascularization noted on today's exam.  Discussed ocular and systemic benefits of blood pressure and blood sugar control.  Return in 1 year for full exam with dilation or sooner if changes to vision.      Suspected glaucoma of both eyes  (primary encounter diagnosis)  Maximum intraocular pressure 21/20  Family history: Yes sisters and mother  Trauma history: No  Gonioscopy:   Pachmetry:     Today's testing:  OCT nerve fiber layer 04/25/22:   Right eye - G(g,y,r) 111 I (g) 139 T (g) 78 S (g) 134 N (g) 93   Left eye - G(g,y,r) 113 I (g) 152 T (g) 85 S (g) 134 N (g) 82   Discussed glaucoma at length including etiology of disease and treatment options including laser, drops, or surgery    Observe for now given low suspicion  Based on c/d asymmetry and strong family history        (H52.4) Presbyopia  Continue OTC readers    Return in about 1 year (around 4/25/2023) for Annual Visit, diabetic eye exam, OCT RNFL.        Zachary Lynn MD     Attending Physician Attestation:  Complete documentation of historical and exam elements from today's encounter can be found in the full encounter summary report (not reduplicated in this progress note).  I personally obtained the chief complaint(s) and history of present illness.  I confirmed and edited as necessary the review of  systems, past medical/surgical history, family history, social history, and examination findings as documented by others; and I examined the patient myself.  I personally reviewed the relevant tests, images, and reports as documented above.  I formulated and edited as necessary the assessment and plan and discussed the findings and management plan with the patient and family. - Zachary Lynn MD

## 2022-04-25 NOTE — NURSING NOTE
Chief Complaints and History of Present Illnesses   Patient presents with     Diabetic Eye Exam       Chief Complaint(s) and History of Present Illness(es)     Diabetic Eye Exam     Diabetes Type: Type 2              Comments     Patient here for diabetic eye exam. Patient has never had a full eye exam. Uses cheaters (+2.50 +3.00) as needed.   No Pain, No Flashes, No Floaters.       DM2  Lab Results       Component                Value               Date                       A1C                      5.6                 04/13/2022                 A1C                      7.1                 05/25/2021                 A1C                      6.3                 01/15/2021                 A1C                      5.9                 11/26/2019                 A1C                      6.1                 10/09/2018                 A1C                      6.3                 05/08/2018                           Joseph Clements, Ophthalmic Assistant

## 2022-05-13 ENCOUNTER — TELEPHONE (OUTPATIENT)
Dept: FAMILY MEDICINE | Facility: CLINIC | Age: 57
End: 2022-05-13
Payer: COMMERCIAL

## 2022-05-13 ENCOUNTER — E-VISIT (OUTPATIENT)
Dept: FAMILY MEDICINE | Facility: CLINIC | Age: 57
End: 2022-05-13
Payer: COMMERCIAL

## 2022-05-13 DIAGNOSIS — K57.32 DIVERTICULITIS OF COLON: Primary | ICD-10-CM

## 2022-05-13 PROCEDURE — 99422 OL DIG E/M SVC 11-20 MIN: CPT | Performed by: FAMILY MEDICINE

## 2022-05-13 RX ORDER — METRONIDAZOLE 500 MG/1
500 TABLET ORAL 3 TIMES DAILY
Qty: 30 TABLET | Refills: 0 | Status: SHIPPED | OUTPATIENT
Start: 2022-05-13 | End: 2022-05-17

## 2022-05-13 RX ORDER — CIPROFLOXACIN 500 MG/1
500 TABLET, FILM COATED ORAL 2 TIMES DAILY
Qty: 20 TABLET | Refills: 0 | Status: SHIPPED | OUTPATIENT
Start: 2022-05-13 | End: 2022-05-17

## 2022-05-13 NOTE — TELEPHONE ENCOUNTER
LS,  Pt calling says he was treated for diverticulitis 3 months ago and pain has been on and off since but off for the last 2-3 week. Just returned a couple of days ago, mostly in URQ.  Requesting axb if possible.  He is in [facility] so not able to come in.  He can do Evisit if you prefer.  He says he was on amoxacillin last time.  Pharm pended.  Please advise.  Thanks,  Jocelin Mckeon RN

## 2022-05-17 ENCOUNTER — TELEPHONE (OUTPATIENT)
Dept: FAMILY MEDICINE | Facility: CLINIC | Age: 57
End: 2022-05-17
Payer: COMMERCIAL

## 2022-05-17 DIAGNOSIS — K57.32 DIVERTICULITIS OF COLON: ICD-10-CM

## 2022-05-17 RX ORDER — METRONIDAZOLE 500 MG/1
500 TABLET ORAL 3 TIMES DAILY
Qty: 30 TABLET | Refills: 0 | Status: SHIPPED | OUTPATIENT
Start: 2022-05-17 | End: 2024-07-15

## 2022-05-17 RX ORDER — CIPROFLOXACIN 500 MG/1
500 TABLET, FILM COATED ORAL 2 TIMES DAILY
Qty: 20 TABLET | Refills: 0 | Status: SHIPPED | OUTPATIENT
Start: 2022-05-17 | End: 2023-09-26

## 2022-05-17 NOTE — TELEPHONE ENCOUNTER
Patient prescribed ciprofloxacin and flagyl on 5/13. He never picked up the prescription and would like them sent to [facility] instead. Addended encounter from 5/13 and updated preferred pharmacy on original rx and re-sent meds. Routing to PCP's team for awareness/review.    Kristyn Alcocer RN

## 2022-05-17 NOTE — TELEPHONE ENCOUNTER
DIAGNOSIS: Bilat wrist pain    APPOINTMENT DATE: 05/21/2022   NOTES STATUS DETAILS   OFFICE NOTE from referring provider N/A    OFFICE NOTE from other specialist N/A    DISCHARGE SUMMARY from hospital N/A    DISCHARGE REPORT from the ER N/A    OPERATIVE REPORT N/A    EMG report N/A    MEDICATION LIST N/A    MRI N/A    DEXA (osteoporosis/bone health) N/A    CT SCAN N/A    XRAYS (IMAGES & REPORTS) N/A

## 2022-05-18 NOTE — PROGRESS NOTES
"[facility]  Sports Medicine Clinic  Ling Anguiano is a 57 year old male presenting to clinic today with b/l wrist pain.    Background:   Date of injury: no injury  Duration of symptoms: chronic   Mechanism of Injury: overuse, years worth of manual labor in construction  Intensity: 7/10 when using   Aggravating factors: use, gripping, steering wheels   Relieving Factors: thumb bracing, cbd/thc, naproxen   Prior Evaluation: 4/13/22, FM.   \"M25.532) Left wrist pain  Comment: discussed referral for ortho   Plan: Orthopedic  Referral        As above , also ice , rest , Tylenol for pain \"      PMH, Medications and Allergies were reviewed and updated as needed.    ROS:  As noted above otherwise negative.    Patient Active Problem List   Diagnosis     Benign essential hypertension     JOJO (obstructive sleep apnea)     Family history of diabetes mellitus     Glucose intolerance (impaired glucose tolerance)     Non morbid obesity due to excess calories     Screening for prostate cancer     Mixed hyperlipidemia     Tobacco use disorder 19-46y/o @ 1.5ppd for 15 yrs then 1ppd=31-32 pk yr hx     Chronic coronary artery disease     Impotence of organic origin     Family history of coronary artery disease     Gastroesophageal reflux disease     History of vasectomy     Hypercholesterolemia     Vitamin D deficiency     Cellulitis and abscess of leg-healing and closing      Obesity (BMI 35.0-39.9) with comorbidity (H)     Elevated fasting glucose     Diabetes mellitus, type 2 (H)     Elevated coronary artery calcium score       Current Outpatient Medications   Medication Sig Dispense Refill     albuterol (PROAIR HFA/PROVENTIL HFA/VENTOLIN HFA) 108 (90 Base) MCG/ACT inhaler Inhale 2 puffs into the lungs every 6 hours as needed for shortness of breath / dyspnea or wheezing 6.7 g 4     aspirin (ASA) 81 MG chewable tablet Take 1 tablet (81 mg) by mouth daily 100 tablet 0     " atorvastatin (LIPITOR) 40 MG tablet Take 1 tablet (40 mg) by mouth daily 90 tablet 1     ciprofloxacin (CIPRO) 500 MG tablet Take 1 tablet (500 mg) by mouth 2 times daily 20 tablet 0     fluticasone (FLONASE) 50 MCG/ACT nasal spray SHAKE LIQUID AND USE 1 SPRAY IN EACH NOSTRIL DAILY 48 g 2     Fluticasone-Salmeterol (ADVAIR HFA IN)        ipratropium - albuterol 0.5 mg/2.5 mg/3 mL (DUONEB) 0.5-2.5 (3) MG/3ML neb solution USE 1 VIAL VIA NEBULIZER EVERY 4 HOURS AS NEEDED FOR SHORTNESS OF BREATH, DYSPNEA,OR WHEEZING 360 mL 2     losartan (COZAAR) 100 MG tablet Take 1 tablet (100 mg) by mouth daily 90 tablet 1     metFORMIN (GLUCOPHAGE-XR) 500 MG 24 hr tablet TAKE 1 TABLET(500 MG) BY MOUTH once  DAILY WITH MEALS 90 tablet 1     metroNIDAZOLE (FLAGYL) 500 MG tablet Take 1 tablet (500 mg) by mouth 3 times daily 30 tablet 0     Multiple Vitamin (MULTIVITAMIN ADULT PO) Take 1 tablet by mouth daily              OBJECTIVE:       Vitals: There were no vitals filed for this visit.  BMI: There is no height or weight on file to calculate BMI.    Gen:  Well nourished and in no acute distress  HEENT: Extraocular movement intact  Neck: Supple  Pulm:  Breathing Comfortably. No increased respiratory effort.  Psych: Euthymic. Appropriately answers questions    MSK: Right and left wrist without visible asymmetry, ecchymosis, or edema.  Tenderness to palpation along the bilateral CMC joints with positive grind test.  Wrist strength full and intact in extension, flexion, radial and ulnar deviation.  Thumb strength mildly diminished secondary to pain in extension flexion opposition and abduction.  Negative Tinel and Phalen at the wrist.      XRAY : Noticeable moderate to severe degenerative changes of the bilateral CMC joints, left worse than right.          ASSESSMENT and PLAN:     Martin was seen today for consult.    Diagnoses and all orders for this visit:    Osteoarthritis of carpometacarpal (CMC) joint of both thumbs  -     Hand  Therapy Referral; Future    Other orders  -     XR WRIST BILATERAL G/E 3 VW; Future  -     Orthopedic  Referral    57-year-old male presenting to clinic today presenting to clinic with a chronic history of bilateral wrist pain.  He has a history of bilateral carpal tunnel, but he is not having persistent numbness or tingling that is bothersome.  Most of his problems are  based, with a diagnosis and a physical exam consistent with CMC osteoarthritis of the bilateral wrist, left greater than right.  The patient is currently bracing as needed if he has a flare.  I have placed him in hand therapy to assist with his overall mobility and function of the thumb.  I do think it is completely fine for him to go ahead and wear his bracing or if he is having an acute flare.  I also think that it would be reasonable for him to embark on a ultrasound-guided injection for the left CMC joint first.  Patient will schedule this and return to clinic at a later date to have this.  Can follow-up with a right CMC ultrasound-guided injection as needed.  Patient is able to return to clinic at the neck scheduled appointment for this procedure.      Options for treatment and/or follow-up care were reviewed with the patient was actively involved in the decision making process. Patient verbalized understanding and was in agreement with the plan.    Sharath Huertas DO  , Sports Medicine  Department of Family Medicine and [facility]

## 2022-05-21 ENCOUNTER — PRE VISIT (OUTPATIENT)
Dept: ORTHOPEDICS | Facility: CLINIC | Age: 57
End: 2022-05-21
Payer: COMMERCIAL

## 2022-05-21 ENCOUNTER — OFFICE VISIT (OUTPATIENT)
Dept: ORTHOPEDICS | Facility: CLINIC | Age: 57
End: 2022-05-21
Attending: FAMILY MEDICINE
Payer: COMMERCIAL

## 2022-05-21 DIAGNOSIS — M18.0 OSTEOARTHRITIS OF CARPOMETACARPAL (CMC) JOINT OF BOTH THUMBS: Primary | ICD-10-CM

## 2022-05-21 PROCEDURE — 99204 OFFICE O/P NEW MOD 45 MIN: CPT | Performed by: STUDENT IN AN ORGANIZED HEALTH CARE EDUCATION/TRAINING PROGRAM

## 2022-05-21 NOTE — LETTER
"    5/21/2022         RE: Martin Anguiano  [address]  [facility] [address]        Dear Colleague,    Thank you for referring your patient, Martin Anguiano, to the [facility] [place]. Please see a copy of my visit note below.    [facility]  Sports Medicine Clinic  [place]           SUBJECTIVE       Martin Anguiano is a 57 year old male presenting to clinic today with b/l wrist pain.    Background:   Date of injury: no injury  Duration of symptoms: chronic   Mechanism of Injury: overuse, years worth of manual labor in construction  Intensity: 7/10 when using   Aggravating factors: use, gripping, steering wheels   Relieving Factors: thumb bracing, cbd/thc, naproxen   Prior Evaluation: 4/13/22, FM.   \"M25.532) Left wrist pain  Comment: discussed referral for ortho   Plan: Orthopedic  Referral        As above , also ice , rest , Tylenol for pain \"      PMH, Medications and Allergies were reviewed and updated as needed.    ROS:  As noted above otherwise negative.    Patient Active Problem List   Diagnosis     Benign essential hypertension     JOJO (obstructive sleep apnea)     Family history of diabetes mellitus     Glucose intolerance (impaired glucose tolerance)     Non morbid obesity due to excess calories     Screening for prostate cancer     Mixed hyperlipidemia     Tobacco use disorder [age]y/o @ 1.5ppd for 15 yrs then 1ppd=31-32 pk yr hx     Chronic coronary artery disease     Impotence of organic origin     Family history of coronary artery disease     Gastroesophageal reflux disease     History of vasectomy     Hypercholesterolemia     Vitamin D deficiency     Cellulitis and abscess of leg-healing and closing      Obesity (BMI 35.0-39.9) with comorbidity (H)     Elevated fasting glucose     Diabetes mellitus, type 2 (H)     Elevated coronary artery calcium score       Current Outpatient Medications   Medication Sig Dispense Refill     albuterol " (PROAIR HFA/PROVENTIL HFA/VENTOLIN HFA) 108 (90 Base) MCG/ACT inhaler Inhale 2 puffs into the lungs every 6 hours as needed for shortness of breath / dyspnea or wheezing 6.7 g 4     aspirin (ASA) 81 MG chewable tablet Take 1 tablet (81 mg) by mouth daily 100 tablet 0     atorvastatin (LIPITOR) 40 MG tablet Take 1 tablet (40 mg) by mouth daily 90 tablet 1     ciprofloxacin (CIPRO) 500 MG tablet Take 1 tablet (500 mg) by mouth 2 times daily 20 tablet 0     fluticasone (FLONASE) 50 MCG/ACT nasal spray SHAKE LIQUID AND USE 1 SPRAY IN EACH NOSTRIL DAILY 48 g 2     Fluticasone-Salmeterol (ADVAIR HFA IN)        ipratropium - albuterol 0.5 mg/2.5 mg/3 mL (DUONEB) 0.5-2.5 (3) MG/3ML neb solution USE 1 VIAL VIA NEBULIZER EVERY 4 HOURS AS NEEDED FOR SHORTNESS OF BREATH, DYSPNEA,OR WHEEZING 360 mL 2     losartan (COZAAR) 100 MG tablet Take 1 tablet (100 mg) by mouth daily 90 tablet 1     metFORMIN (GLUCOPHAGE-XR) 500 MG 24 hr tablet TAKE 1 TABLET(500 MG) BY MOUTH once  DAILY WITH MEALS 90 tablet 1     metroNIDAZOLE (FLAGYL) 500 MG tablet Take 1 tablet (500 mg) by mouth 3 times daily 30 tablet 0     Multiple Vitamin (MULTIVITAMIN ADULT PO) Take 1 tablet by mouth daily              OBJECTIVE:       Vitals: There were no vitals filed for this visit.  BMI: There is no height or weight on file to calculate BMI.    Gen:  Well nourished and in no acute distress  HEENT: Extraocular movement intact  Neck: Supple  Pulm:  Breathing Comfortably. No increased respiratory effort.  Psych: Euthymic. Appropriately answers questions    MSK: Right and left wrist without visible asymmetry, ecchymosis, or edema.  Tenderness to palpation along the bilateral CMC joints with positive grind test.  Wrist strength full and intact in extension, flexion, radial and ulnar deviation.  Thumb strength mildly diminished secondary to pain in extension flexion opposition and abduction.  Negative Tinel and Phalen at the wrist.      XRAY : Noticeable moderate to  severe degenerative changes of the bilateral CMC joints, left worse than right.          ASSESSMENT and PLAN:     Martin was seen today for consult.    Diagnoses and all orders for this visit:    Osteoarthritis of carpometacarpal (CMC) joint of both thumbs  -     Hand Therapy Referral; Future    Other orders  -     XR WRIST BILATERAL G/E 3 VW; Future  -     Orthopedic  Referral    57-year-old male presenting to clinic today presenting to clinic with a chronic history of bilateral wrist pain.  He has a history of bilateral carpal tunnel, but he is not having persistent numbness or tingling that is bothersome.  Most of his problems are  based, with a diagnosis and a physical exam consistent with CMC osteoarthritis of the bilateral wrist, left greater than right.  The patient is currently bracing as needed if he has a flare.  I have placed him in hand therapy to assist with his overall mobility and function of the thumb.  I do think it is completely fine for him to go ahead and wear his bracing or if he is having an acute flare.  I also think that it would be reasonable for him to embark on a ultrasound-guided injection for the left CMC joint first.  Patient will schedule this and return to clinic at a later date to have this.  Can follow-up with a right CMC ultrasound-guided injection as needed.  Patient is able to return to clinic at the neck scheduled appointment for this procedure.      Options for treatment and/or follow-up care were reviewed with the patient was actively involved in the decision making process. Patient verbalized understanding and was in agreement with the plan.    Sharath Huertas DO  , Sports Medicine  Department of [facility]        Again, thank you for allowing me to participate in the care of your patient.        Sincerely,        Sharath Huertas DO

## 2022-05-23 ENCOUNTER — TELEPHONE (OUTPATIENT)
Dept: ORTHOPEDICS | Facility: CLINIC | Age: 57
End: 2022-05-23
Payer: COMMERCIAL

## 2022-05-23 NOTE — TELEPHONE ENCOUNTER
5/23 Provided phone number [phone] to  schedule left thumb cmc injection, as well as hand therapy.    Kiera dawson Procedure   Orthopedics, Podiatry, Sports Medicine, ENT/Eye Specialties  [facility]   [phone]

## 2022-05-25 ENCOUNTER — MYC MEDICAL ADVICE (OUTPATIENT)
Dept: FAMILY MEDICINE | Facility: CLINIC | Age: 57
End: 2022-05-25
Payer: COMMERCIAL

## 2022-05-25 DIAGNOSIS — J45.30 MILD PERSISTENT ASTHMA WITHOUT COMPLICATION: Primary | ICD-10-CM

## 2022-05-25 DIAGNOSIS — E11.9 TYPE 2 DIABETES MELLITUS WITHOUT COMPLICATION, WITHOUT LONG-TERM CURRENT USE OF INSULIN (H): ICD-10-CM

## 2022-05-26 RX ORDER — FLUTICASONE PROPIONATE AND SALMETEROL XINAFOATE 115; 21 UG/1; UG/1
2 AEROSOL, METERED RESPIRATORY (INHALATION) 2 TIMES DAILY
Qty: 12 G | Refills: 0 | Status: SHIPPED | OUTPATIENT
Start: 2022-05-26 | End: 2022-07-22

## 2022-06-01 ENCOUNTER — TELEPHONE (OUTPATIENT)
Dept: FAMILY MEDICINE | Facility: CLINIC | Age: 57
End: 2022-06-01
Payer: COMMERCIAL

## 2022-06-01 NOTE — TELEPHONE ENCOUNTER
Prior Authorization Retail Medication Request    Medication/Dose: fluticasone-salmeterol (ADVAIR HFA) 115-21 MCG/ACT inhaler  ICD code (if different than what is on RX):  Mild persistent asthma without complication [J45.30]  - Primary  Previously Tried and Failed:  unknown  Rationale:  unknown    Insurance Name:  [facility]  Insurance ID:  [id number]      Pharmacy Information (if different than what is on RX)  Name:  [facility]  Phone:  [phone]

## 2022-06-06 NOTE — TELEPHONE ENCOUNTER
Prior Authorization Approval    Authorization Effective Date: 5/7/2022  Authorization Expiration Date: 6/6/2023  Medication: fluticasone-salmeterol (ADVAIR HFA) 115-21 MCG/ACT inhaler  Approved Dose/Quantity:   Reference #: [id number]   Insurance Company: [employer] - Phone [phone] Fax [phone]  Which Pharmacy is filling the prescription (Not needed for infusion/clinic administered): [id number] - [place], [place] - [address] [facility] AT [facility] & [address] [place]  Pharmacy Notified: Yes  Patient Notified: Yes

## 2022-06-06 NOTE — TELEPHONE ENCOUNTER
PA Initiation    Medication: fluticasone-salmeterol (ADVAIR HFA) 115-21 MCG/ACT inhaler  Insurance Company: [employer] - Phone [phone] Fax [phone]  Pharmacy Filling the Rx: [id number] - [place], [place] - [facility] AT [facility] [address]  Filling Pharmacy Phone: [phone]  Filling Pharmacy Fax: [phone]  Start Date: 6/6/2022

## 2022-06-23 NOTE — TELEPHONE ENCOUNTER
LS  Pt requesting a rx for glucometer.  Pended order for generic.    Please approve if appropriate  Gwen Das RN

## 2022-07-28 RX ORDER — LOSARTAN POTASSIUM 100 MG/1
TABLET ORAL
Qty: 1 TABLET | Refills: 0 | OUTPATIENT
Start: 2022-07-28

## 2022-09-10 ENCOUNTER — HEALTH MAINTENANCE LETTER (OUTPATIENT)
Age: 57
End: 2022-09-10

## 2022-10-05 DIAGNOSIS — E78.00 HYPERCHOLESTEROLEMIA: ICD-10-CM

## 2022-10-06 ENCOUNTER — MYC MEDICAL ADVICE (OUTPATIENT)
Dept: FAMILY MEDICINE | Facility: CLINIC | Age: 57
End: 2022-10-06

## 2022-10-06 RX ORDER — ATORVASTATIN CALCIUM 40 MG/1
TABLET, FILM COATED ORAL
Qty: 90 TABLET | Refills: 1 | Status: SHIPPED | OUTPATIENT
Start: 2022-10-06 | End: 2022-12-06

## 2022-10-06 NOTE — TELEPHONE ENCOUNTER
"Requested Prescriptions   Pending Prescriptions Disp Refills     atorvastatin (LIPITOR) 40 MG tablet [Pharmacy Med Name: ATORVASTATIN 40MG TABLETS] 90 tablet 1     Sig: TAKE 1 TABLET(40 MG) BY MOUTH DAILY       Statins Protocol Passed - 10/5/2022  9:09 AM        Passed - LDL on file in past 12 months     Recent Labs   Lab Test 04/13/22  0935   LDL 45             Passed - No abnormal creatine kinase in past 12 months     No lab results found.             Passed - Recent (12 mo) or future (30 days) visit within the authorizing provider's specialty     Patient has had an office visit with the authorizing provider or a provider within the authorizing providers department within the previous 12 mos or has a future within next 30 days. See \"Patient Info\" tab in inbasket, or \"Choose Columns\" in Meds & Orders section of the refill encounter.              Passed - Medication is active on med list        Passed - Patient is age 18 or older           Prescription approved per [facility] Refill Protocol.  Pt had an appointment on 04/13/2022    Deepthi Bermudez RN  [facility]    "

## 2022-10-12 DIAGNOSIS — R69 DIAGNOSIS UNKNOWN: Primary | ICD-10-CM

## 2022-10-19 ENCOUNTER — OFFICE VISIT (OUTPATIENT)
Dept: ORTHOPEDICS | Facility: CLINIC | Age: 57
End: 2022-10-19
Payer: COMMERCIAL

## 2022-10-19 DIAGNOSIS — M79.645 BILATERAL THUMB PAIN: Primary | ICD-10-CM

## 2022-10-19 DIAGNOSIS — M79.644 BILATERAL THUMB PAIN: Primary | ICD-10-CM

## 2022-10-19 PROCEDURE — 20604 DRAIN/INJ JOINT/BURSA W/US: CPT | Mod: 50 | Performed by: FAMILY MEDICINE

## 2022-10-19 RX ADMIN — BETAMETHASONE SODIUM PHOSPHATE AND BETAMETHASONE ACETATE 6 MG: 3; 3 INJECTION, SUSPENSION INTRA-ARTICULAR; INTRALESIONAL; INTRAMUSCULAR; SOFT TISSUE at 14:18

## 2022-10-19 NOTE — LETTER
10/19/2022         RE: Martin Anguiano  [address]  [place] [address]        Dear Colleague,    Thank you for referring your patient, Martin Anguiano, to the [facility]. Please see a copy of my visit note below.      [facility] CLINIC VISIT     Oct 19, 2022        ASSESSMENT & PLAN    57-year-old with bilateral first CMC DJD    Reviewed imaging and assessment with patient in detail  Bilateral injections today.  See procedure for details.    Isaiah Guy MD  [facility]    -----  Chief Complaint   Patient presents with     RECHECK     Bilateral thumb pain       SUBJECTIVE  Martin Anguiano is a/an 57 year old male who is seen as a self referral for evaluation of  Bilateral thumb pain.     The patient is seen by themselves.  The patient is Right handed    Onset: 2+ years(s) ago. Reports insidious onset without acute precipitating event.  Location of Pain: bilateral thumb CMC pain  Worsened by: Using the thumbs, pinching  Better with: CBD oil  Treatments tried: rest/activity avoidance  Associated symptoms: no distal numbness or tingling; denies swelling or warmth    Orthopedic/Surgical history: NO  Social History/Occupation: Contractor      REVIEW OF SYSTEMS:    Do you have fever, chills, weight loss? No    Do you have any vision problems? No    Do you have any chest pain or edema? No    Do you have any shortness of breath or wheezing?  No    Do you have stomach problems? No    Do you have any numbness or focal weakness? No    Do you have diabetes? Yes, metformin    Do you have problems with bleeding or clotting? No    Do you have an rashes or other skin lesions? No    OBJECTIVE:  There were no vitals taken for this visit.     No exam this visit    RADIOLOGY:    3 view xrays of bilateral wrists performed 5/21/2022 and reviewed independently demonstrating at least moderate DJD of the bilateral  first CMC joints. See EMR for formal radiology report.        Ultrasound-guided bilateral first CMC injections    Date/Time: 10/19/2022 2:18 PM  Performed by: Isaiah Guy MD  Authorized by: Isaiah Guy MD     Indications:  Pain  Needle Size:  25 G  Guidance: ultrasound    Approach:  Dorsal  Condition: osteoarthritis    Laterality:  Bilateral  Location:  Thumb  Site:  Bilateral thumb CMC    Medications (Right):  6 mg betamethasone acet & sod phos 6 (3-3) MG/ML  Medications (Left):  6 mg betamethasone acet & sod phos 6 (3-3) MG/ML  Outcome:  Tolerated well, no immediate complications  Procedure discussed: discussed risks, benefits, and alternatives    Consent Given by:  Patient  Timeout: timeout called immediately prior to procedure    Prep: patient was prepped and draped in usual sterile fashion     Patient was positioned with the right hand in a neutral position on exam table.  The area overlying the first CMC joint was cleaned with chlorhexidine swab.  Next using ultrasound guidance the CMC joint was identified.  Ultrasound was necessary due to the degree of osteoarthritis and the small size of the joint space.  The skin was anesthetized with ethyl chloride spray.  Next using direct and continuous ultrasound guidance a 25-gauge needle was introduced into the joint space.  A solution of 6 mg Celestone and 1 mL 1% lidocaine was injected and seen flowing into the joint space.  Images were captured and saved to the permanent record.  The area was covered with a bandage.  Next the identical procedure was repeated on the left first CMC joint.  The patient tolerated the procedure well.  There were no immediate complications.  Routine postinjection instructions were given to the patient.  Recommended follow-up should she develop any significant increase in pain, redness, swelling or drainage from the injection site.    Isaiah Guy MD                Again, thank you for allowing me to  participate in the care of your patient.        Sincerely,        Isaiah Guy MD

## 2022-10-19 NOTE — PROGRESS NOTES
[facility]  SPORTS MEDICINE CLINIC VISIT     Oct 19, 2022        ASSESSMENT & PLAN    57-year-old with bilateral first CMC DJD    Reviewed imaging and assessment with patient in detail  Bilateral injections today.  See procedure for details.    Isaiah Guy MD  [facility] SPORTS MEDICINE CLINIC [place]    -----  Chief Complaint   Patient presents with     RECHECK     Bilateral thumb pain       SUBJECTIVE  Martin Anguiano is a/an 57 year old male who is seen as a self referral for evaluation of  Bilateral thumb pain.     The patient is seen by themselves.  The patient is Right handed    Onset: 2+ years(s) ago. Reports insidious onset without acute precipitating event.  Location of Pain: bilateral thumb CMC pain  Worsened by: Using the thumbs, pinching  Better with: CBD oil  Treatments tried: rest/activity avoidance  Associated symptoms: no distal numbness or tingling; denies swelling or warmth    Orthopedic/Surgical history: NO  Social History/Occupation: Contractor      REVIEW OF SYSTEMS:    Do you have fever, chills, weight loss? No    Do you have any vision problems? No    Do you have any chest pain or edema? No    Do you have any shortness of breath or wheezing?  No    Do you have stomach problems? No    Do you have any numbness or focal weakness? No    Do you have diabetes? Yes, metformin    Do you have problems with bleeding or clotting? No    Do you have an rashes or other skin lesions? No    OBJECTIVE:  There were no vitals taken for this visit.     No exam this visit    RADIOLOGY:    3 view xrays of bilateral wrists performed 5/21/2022 and reviewed independently demonstrating at least moderate DJD of the bilateral first CMC joints. See EMR for formal radiology report.        Ultrasound-guided bilateral first CMC injections    Date/Time: 10/19/2022 2:18 PM  Performed by: Isaiah Guy MD  Authorized by: Isaiah Guy MD     Indications:  Pain  Needle Size:  25  G  Guidance: ultrasound    Approach:  Dorsal  Condition: osteoarthritis    Laterality:  Bilateral  Location:  Thumb  Site:  Bilateral thumb CMC    Medications (Right):  6 mg betamethasone acet & sod phos 6 (3-3) MG/ML  Medications (Left):  6 mg betamethasone acet & sod phos 6 (3-3) MG/ML  Outcome:  Tolerated well, no immediate complications  Procedure discussed: discussed risks, benefits, and alternatives    Consent Given by:  Patient  Timeout: timeout called immediately prior to procedure    Prep: patient was prepped and draped in usual sterile fashion     Patient was positioned with the right hand in a neutral position on exam table.  The area overlying the first CMC joint was cleaned with chlorhexidine swab.  Next using ultrasound guidance the CMC joint was identified.  Ultrasound was necessary due to the degree of osteoarthritis and the small size of the joint space.  The skin was anesthetized with ethyl chloride spray.  Next using direct and continuous ultrasound guidance a 25-gauge needle was introduced into the joint space.  A solution of 6 mg Celestone and 1 mL 1% lidocaine was injected and seen flowing into the joint space.  Images were captured and saved to the permanent record.  The area was covered with a bandage.  Next the identical procedure was repeated on the left first CMC joint.  The patient tolerated the procedure well.  There were no immediate complications.  Routine postinjection instructions were given to the patient.  Recommended follow-up should she develop any significant increase in pain, redness, swelling or drainage from the injection site.    Isaiah Guy MD

## 2022-10-20 ENCOUNTER — TELEPHONE (OUTPATIENT)
Dept: GASTROENTEROLOGY | Facility: CLINIC | Age: 57
End: 2022-10-20

## 2022-10-20 NOTE — TELEPHONE ENCOUNTER
REFERRAL INFORMATION:    Referring Provider:  Self Referral     Referring Clinic:  N/A    Reason for Visit/Diagnosis: Food getting stuck     FUTURE VISIT INFORMATION:    Appointment Date: 11/1/2022    Appointment Time: 8:40 AM      NOTES STATUS DETAILS   OFFICE NOTE from Referring Provider N/A    OFFICE NOTE from Other Specialist N/A    HOSPITAL DISCHARGE SUMMARY/  ED VISITS Care Everywhere 10/11/2022 (Pedrito Martinez)    OPERATIVE REPORT N/A    MEDICATION LIST N/A         ENDOSCOPY  N/A    COLONOSCOPY Internal 7/31/18   ERCP N/A    EUS N/A    STOOL TESTING N/A    PERTINENT LABS Care Everywhere    PATHOLOGY REPORTS (RELATED) Internal 7/31/18   IMAGING (CT, MRI, EGD, MRCP, Small Bowel Follow Through/SBT, MR/CT Enterography) N/A

## 2022-10-20 NOTE — TELEPHONE ENCOUNTER
Health Call Center    Phone Message    May a detailed message be left on voicemail: yes     Reason for Call: Appointment Intake    Referring Provider Name: Self referred  Diagnosis and/or Symptoms: Food getting stuck    Per triage notes, patient is to be seen as a Esophageal Emergent, but is currently scheduled on 11/01/2023 with Dr. Socrates Duarte. Current appointment is outside requested time frame. Please review for further follow up per scheduling guidelines. Thanks!     Action Taken: Message routed to:  [facility] ([facility] GI    Travel Screening: Not Applicable

## 2022-10-26 RX ORDER — BETAMETHASONE SODIUM PHOSPHATE AND BETAMETHASONE ACETATE 3; 3 MG/ML; MG/ML
6 INJECTION, SUSPENSION INTRA-ARTICULAR; INTRALESIONAL; INTRAMUSCULAR; SOFT TISSUE
Status: SHIPPED | OUTPATIENT
Start: 2022-10-19

## 2022-10-27 ENCOUNTER — MYC MEDICAL ADVICE (OUTPATIENT)
Dept: GASTROENTEROLOGY | Facility: CLINIC | Age: 57
End: 2022-10-27

## 2022-10-28 NOTE — TELEPHONE ENCOUNTER
Called to remind patient of their upcoming appointment with our GI clinic, on Tuesday 11/1/2022 at 8:40AM with Dr. Duarte. This appointment is scheduled as a video visit. You will receive a call approximately 30 minutes prior to check you in, you must be in [place] for this visit., if your appointment is virtual (video or telephone) you need to be in [place] for the visit. To reschedule or cancel patient to call [phone].    Megan Steen MA

## 2022-11-01 ENCOUNTER — VIRTUAL VISIT (OUTPATIENT)
Dept: GASTROENTEROLOGY | Facility: CLINIC | Age: 57
End: 2022-11-01
Payer: COMMERCIAL

## 2022-11-01 ENCOUNTER — PRE VISIT (OUTPATIENT)
Dept: GASTROENTEROLOGY | Facility: CLINIC | Age: 57
End: 2022-11-01

## 2022-11-01 VITALS — WEIGHT: 240 LBS | BODY MASS INDEX: 33.57 KG/M2

## 2022-11-01 DIAGNOSIS — K57.32 DIVERTICULITIS OF COLON: ICD-10-CM

## 2022-11-01 DIAGNOSIS — F17.200 TOBACCO USE DISORDER: ICD-10-CM

## 2022-11-01 DIAGNOSIS — R12 HEARTBURN: ICD-10-CM

## 2022-11-01 DIAGNOSIS — R49.0 DYSPHONIA: ICD-10-CM

## 2022-11-01 DIAGNOSIS — R13.19 ESOPHAGEAL DYSPHAGIA: Primary | ICD-10-CM

## 2022-11-01 PROCEDURE — 99204 OFFICE O/P NEW MOD 45 MIN: CPT | Mod: 95 | Performed by: PHYSICIAN ASSISTANT

## 2022-11-01 ASSESSMENT — PAIN SCALES - GENERAL: PAINLEVEL: NO PAIN (0)

## 2022-11-01 NOTE — PROGRESS NOTES
dana is a 57 year old who is being evaluated via a billable video visit.      How would you like to obtain your AVS? MyChart  If the video visit is dropped, the invitation should be resent by: Send to e-mail at: [email or web address]  Will anyone else be joining your video visit? No        Video-Visit Details    Video Start Time: 8:41 AM    Type of service:  Video Visit    Video End Time:910    Originating Location (pt. Location): Home        Distant Location (provider location):  Off-site    Platform used for Video Visit: [facility]    Gastroenterology Visit for: Martin Anguiano 1965   MRN: [id number]     Reason for Visit:  chief complaint    Referred by: Self  / No address on file  Patient Care Team:  Johana Tamez MD as PCP - General (Family Practice)  Johana Tamez MD as Assigned PCP  Umang Villafana MD as Resident (House Physician)  Goltz, Bennett Ezra, PA-C as Assigned Neuroscience Provider  Zachary Lynn MD as Assigned Surgical Provider  Nakia Cabrera [facility] as Assigned MTM Pharmacist  Socrates Duarte DO as MD (Gastroenterology)  Isaiah Guy MD as Assigned Musculoskeletal Provider    History of Present Illness:   Martin Anguiano is a 57 year old male with significant past medical history for obesity, DM type II, HTN, HLD, JOJO, seasonal allergies (fall) and asthma who is presenting as a new patient after ER evaluation for foreign body sensation/concern for food impaction.    Dana was recently seen in the ER 10/11/2022 for concern of possible esophageal foreign body.  At this time it was reported that he was eating a sandwich that he subsequently began to choke with difficulty breathing and to relieve the sensation his wife had to perform the Heimlich maneuver.  The patient was given EZ gas crystals in the sensation had subsided.  -------------------------------------------------------------------------------------------------    Today he reports that prior to going to the  ER he was eating a sandwhich with meat. He reports coughing and followed by the sensation of food becoming stuck. His wife attempted the Heimlich which helped relieve the pressure. However following he was unable to swallow water which is what brought him to the ER.    Over the past 2 - 3 years with specific foods (bread) he feels as if when eating the food is slowly passing through his esophagus. With sips of water this sensation improves. Over the past month has been eating small bites of meats and chewing thoroughly.     Reports having heartburn that can occur 1-2 times per week that is resolved with TUMS. He has taken Prilosec in the past and will take this prior to eating a meal that he knows will result in heartburn symptoms. Specifically he notes pizza or BBQ. Symptoms of heartburn have been on going for the past 10 years. 5 years prior had started using his CPAP with which he reports to be significant improvement.     Today a significant rapines was noted while speaking with the pt. He reports that he had stopped smoking 6 months and voice has sounded raspinesss since. His wife reports that this is cyclical for him however and occurs yearly. Typically the symptoms last for months at a time. In addition she reports that he has chronic daily coughing. Per the pt this is in the mornings after using CPAP. The cough is productive. After the use of the CPAP he also reports nasal congestion.     Currently he is taking fiber pills after his episode of diverticulitis to prevent constipation.      Denies weight loss, nausea, emesis, odynophagia, regurgitation, abdominal pain, bloating/fullness, post prandial bloating, diarrhea, constipation, incontinence, melena, hematochezia and BRBR.     No NSIADs or Tylenol. No use of OTC herbal supplements/weight loss products.      Drinks ETOH once or twice per drink. Intermittent use of Marijuana.     No family history or GI related malignancy (esophageal, gastric, pancreatic,  liver or colon).     He is 1 of 9 kids and over half have asthma. Both of his children have asthma.     No prior upper endoscopy.       Wt Readings from Last 5 Encounters:   11/01/22 108.9 kg (240 lb)   04/13/22 119.1 kg (262 lb 8 oz)   08/24/21 122.1 kg (269 lb 1.6 oz)   07/13/21 124.7 kg (275 lb)   05/25/21 130.6 kg (288 lb)        Esophageal Questionnaire(s)    BEDQ Questionnaire  BEDQ Questionnaire: How Often Have You Had the Following? 11/1/2022   Trouble eating solid food (meat, bread, vegetables) 2   Trouble eating soft foods (yogurt, jello, pudding) 0   Trouble swallowing liquids 0   Pain while swallowing 0   Coughing or choking while swallowing foods or liquids 1   Total Score: 3     BEDQ Questionnaire: Discomfort/Pain Ratings 11/1/2022   Eating solid food (meat, bread, vegetables) 1   Eating soft foods (yogurt, jello, pudding) 0   Drinking liquid 0   Total Score: 1       Eckardt Questionnaire  Eckardt Questionnaire 11/1/2022   Dysphagia 1   Regurgitation 0   Retrosternal Pain 0   Weight Loss (kg) 0   Total Score:  1       Promis 10 Questionnaire  PROMIS 10 FLOWSHEET DATA 11/1/2022   In general, would you say your health is: 4   In general, would you say your quality of life is: 4   In general, how would you rate your physical health? 4   In general, how would you rate your mental health, including your mood and your ability to think? 4   In general, how would you rate your satisfaction with your social activities and relationships? 4   In general, please rate how well you carry out your usual social activities and roles. (This includes activities at home, at work and in your community, and responsibilities as a parent, child, spouse, employee, friend, etc.) 4   To what extent are you able to carry out your everyday physical activities such as walking, climbing stairs, carrying groceries, or moving a chair? 5   In the past 7 days, how often have you been bothered by emotional problems such as feeling  anxious, depressed, or irritable? 1   In the past 7 days, how would you rate your fatigue on average? 2   In the past 7 days, how would you rate your pain on average, where 0 means no pain, and 10 means worst imaginable pain? 0   Mental health question re-calculation - no clinical value 5   Physical health question re-calculation - no clinical value 4   Pain question re-calculation - no clinical value 5   Global Mental Health Score 17   Global Physical Health Score 18   PROMIS TOTAL - SUBSCORES 35           STUDIES & PROCEDURES:    EGD:       Colonoscopy:    7/2018   Impression:       - The examined portion of the ileum was normal.                             - One 4 mm polyp at the recto-sigmoid colon,                             removed with a cold snare. Resected and retrieved.                             - The examination was otherwise normal on direct                             and retroflexion views.     FINAL DIAGNOSIS:   Colon, rectosigmoid, polypectomy:   - Hyperplastic polyp     Recall recommended 10 years      EndoFLIP directed at the UES or LES (8cm (EF-325) balloon length or 16cm (EF-322) balloon length):   Date:  8cm balloon  Balloon inflation Balloon pressure CSA (mm^2) DI (mm^2/mmHg) Dmin (mm) Compliance   20 (ladmark ID)        30        40        50           16cm balloon  Balloon inflation Balloon pressure CSA (mm^2) DI (mm^2/mmHg) Dmin (mm) Compliance   30 (ladmark ID)        40        50        60        70           High Resolution Manometry:    PH/Impedance:       Bravo:      CT:    2/2022 CT AP W Contrast   IMPRESSIONS:   1. Small amount pneumoperitoneum is present within the, consistent with bowel   perforation.   2. There is a cluster small-bowel loops in the midline pelvis with severe wall   thickening and surrounding infiltration of the mesenteric fat seen. Small bowel   ischemia, enteritis, or vasculitis should be considered.   3. Moderate diverticulosis of the sigmoid colon is present  with wall thickening   noted. The wall thickening may be secondary to the adjacent small bowel   inflammatory changes. Diverticulitis is considered less likely but cannot be   completely excluded.       Esophagram:      Prior medical records were reviewed including, but not limited to, notes from referring providers, lab work, radiographic tests, and other diagnostic tests. Pertinent results were summarized above.     History     Past Medical History:   Diagnosis Date     HTN (hypertension)      Hypercholesteremia      JOJO (obstructive sleep apnea)      Pre-diabetes        Past Surgical History:   Procedure Laterality Date     NO HISTORY OF SURGERY         Social History     Socioeconomic History     Marital status: Single     Spouse name: Not on file     Number of children: Not on file     Years of education: Not on file     Highest education level: Not on file   Occupational History     Not on file   Tobacco Use     Smoking status: Former     Packs/day: 1.00     Years: 20.00     Pack years: 20.00     Types: Cigarettes     Quit date: 10/4/2016     Years since quittin.0     Smokeless tobacco: Never     Tobacco comments:     last 10 years were off and on, about 2016   Vaping Use     Vaping Use: Never used   Substance and Sexual Activity     Alcohol use: Yes     Alcohol/week: 0.0 standard drinks     Comment: 1-2 times per week, 2 drinks     Drug use: No     Sexual activity: Yes     Partners: Female   Other Topics Concern     Parent/sibling w/ CABG, MI or angioplasty before 65F 55M? Yes   Social History Narrative     Not on file     Social Determinants of Health     Financial Resource Strain: Not on file   Food Insecurity: Not on file   Transportation Needs: Not on file   Physical Activity: Not on file   Stress: Not on file   Social Connections: Not on file   Intimate Partner Violence: Not on file   Housing Stability: Not on file       Family History   Problem Relation Age of Onset     Glaucoma Mother       Diabetes Mother      Hypertension Father      Hyperlipidemia Father      Diabetes Brother      Coronary Artery Disease No family hx of      Cerebrovascular Disease No family hx of      Breast Cancer No family hx of      Colon Cancer No family hx of      Prostate Cancer No family hx of      Other Cancer No family hx of      Depression No family hx of      Anxiety Disorder No family hx of      Mental Illness No family hx of      Substance Abuse No family hx of      Anesthesia Reaction No family hx of      Asthma No family hx of      Osteoporosis No family hx of      Genetic Disorder No family hx of      Thyroid Disease No family hx of      Obesity No family hx of      Unknown/Adopted No family hx of      Family history reviewed and edited as appropriate    Medications and Allergies:     Outpatient Encounter Medications as of 11/1/2022   Medication Sig Dispense Refill     ADVAIR -21 MCG/ACT inhaler INHALE 2 PUFFS INTO THE LUNGS TWICE DAILY 12 g 1     albuterol (PROAIR HFA/PROVENTIL HFA/VENTOLIN HFA) 108 (90 Base) MCG/ACT inhaler INHALE 2 PUFFS INTO THE LUNGS EVERY 6 HOURS AS NEEDED FOR SHORTNESS OF BREATH OR DIFFICULT BREATHING OR WHEEZING 6.7 g 4     aspirin (ASA) 81 MG chewable tablet Take 1 tablet (81 mg) by mouth daily 100 tablet 0     atorvastatin (LIPITOR) 40 MG tablet TAKE 1 TABLET(40 MG) BY MOUTH DAILY 90 tablet 1     blood glucose (NO BRAND SPECIFIED) lancets standard Use to test blood sugar 2 times daily or as directed. 100 each 1     blood glucose (NO BRAND SPECIFIED) test strip Use to test blood sugar 2 times daily or as directed. 100 strip 1     blood glucose monitoring (NO BRAND SPECIFIED) meter device kit Use to test blood sugar 2 times daily or as directed. 1 kit 0     ciprofloxacin (CIPRO) 500 MG tablet Take 1 tablet (500 mg) by mouth 2 times daily 20 tablet 0     fluticasone (FLONASE) 50 MCG/ACT nasal spray SHAKE LIQUID AND USE 1 SPRAY IN EACH NOSTRIL DAILY 48 g 2     Fluticasone-Salmeterol  "(ADVAIR HFA IN)        ipratropium - albuterol 0.5 mg/2.5 mg/3 mL (DUONEB) 0.5-2.5 (3) MG/3ML neb solution USE 1 VIAL VIA NEBULIZER EVERY 4 HOURS AS NEEDED FOR SHORTNESS OF BREATH, DYSPNEA,OR WHEEZING 360 mL 2     losartan (COZAAR) 100 MG tablet TAKE 1 TABLET(100 MG) BY MOUTH DAILY 90 tablet 0     metFORMIN (GLUCOPHAGE-XR) 500 MG 24 hr tablet TAKE 1 TABLET(500 MG) BY MOUTH once  DAILY WITH MEALS 90 tablet 1     metroNIDAZOLE (FLAGYL) 500 MG tablet Take 1 tablet (500 mg) by mouth 3 times daily 30 tablet 0     Multiple Vitamin (MULTIVITAMIN ADULT PO) Take 1 tablet by mouth daily       Facility-Administered Encounter Medications as of 11/1/2022   Medication Dose Route Frequency Provider Last Rate Last Admin     betamethasone acet & sod phos (CELESTONE) injection 6 mg  6 mg   Isaiah Guy MD   6 mg at 10/19/22 1418     betamethasone acet & sod phos (CELESTONE) injection 6 mg  6 mg   Isaiah Guy MD   6 mg at 10/19/22 1418        Allergies   Allergen Reactions     Ibuprofen      Other reaction(s): Other - Describe In Comment Field  Uri type of symptoms; \"watery eyes\". Gel caps are  OK  Eyes water,runny nose     Lisinopril Cough        Review of systems:  A full 10 point review of systems was obtained and was negative except for the pertinent positives and negatives stated within the HPI.    Objective Findings:   Physical Exam:    Constitutional: Wt 108.9 kg (240 lb)   BMI 33.57 kg/m    General: Alert, cooperative, no distress, well-appearing  Head: Atraumatic, normocephalic, no obvious abnormalities   Eyes: Sclera anicteric, no obvious conjunctival hemorrhage   Nose: Nares normal, no obvious malformation, no obvious rhinorrhea   Respiratory: normal appearing respirations, no cough  Skin: No jaundice, no obvious rash  Neurologic: AAOx3, no obvious neurologic abnormality  Psychiatric: Normal Affect, appropriate mood     Labs, Radiology, Pathology     Lab Results   Component Value Date    WBC 9.8 " 02/28/2019    HGB 15.5 02/28/2019     02/28/2019    CHOL 111 04/13/2022    CHOL 139 01/15/2021    CHOL 137 08/19/2019    TRIG 113 04/13/2022    TRIG 114 01/15/2021    TRIG 82 08/19/2019    HDL 43 04/13/2022    HDL 50 01/15/2021    HDL 48 08/19/2019    ALT 51 04/13/2022    ALT 56 01/15/2021    ALT 62 11/26/2019    AST 28 04/13/2022    AST 27 01/15/2021    AST 34 11/26/2019     04/13/2022     07/13/2021     05/25/2021    BUN 16 04/13/2022    BUN 14 07/13/2021    BUN 19 05/25/2021    CO2 25 04/13/2022    CO2 23 07/13/2021    CO2 25 05/25/2021        Liver Function Studies -   Recent Labs   Lab Test 04/13/22  0935   PROTTOTAL 8.2   ALBUMIN 4.2   BILITOTAL 0.8   ALKPHOS 86   AST 28   ALT 51          Patient Active Problem List    Diagnosis Date Noted     Esophageal dysphagia 11/03/2022     Priority: Medium     Diverticulitis of colon 11/03/2022     Priority: Medium     Heartburn 11/03/2022     Priority: Medium     Dysphonia 11/03/2022     Priority: Medium     Diabetes mellitus, type 2 (H) 07/13/2021     Priority: Medium     Elevated coronary artery calcium score 07/13/2021     Priority: Medium     Elevated fasting glucose 05/25/2021     Priority: Medium     Obesity (BMI 35.0-39.9) with comorbidity (H) 10/09/2018     Priority: Medium     Cellulitis and abscess of leg-healing and closing  10/25/2016     Priority: Medium     Family history of diabetes mellitus 10/21/2016     Priority: Medium     Glucose intolerance (impaired glucose tolerance) 10/21/2016     Priority: Medium     Non morbid obesity due to excess calories 10/21/2016     Priority: Medium     Screening for prostate cancer 10/21/2016     Priority: Medium     Mixed hyperlipidemia 10/21/2016     Priority: Medium     Tobacco use disorder 19-48y/o @ 1.5ppd for 15 yrs then 1ppd=31-32 pk yr hx 10/21/2016     Priority: Medium     Benign essential hypertension 03/31/2016     Priority: Medium     JOJO (obstructive sleep apnea) 03/31/2016      Priority: Medium     Chronic coronary artery disease 12/17/2014     Priority: Medium     Overview:   By 12/2014 heart scan.       Impotence of organic origin 08/06/2013     Priority: Medium     History of vasectomy 05/24/2013     Priority: Medium     Vitamin D deficiency 04/11/2011     Priority: Medium     Family history of coronary artery disease 03/01/2011     Priority: Medium     Gastroesophageal reflux disease 07/22/2009     Priority: Medium     Hypercholesterolemia 07/22/2009     Priority: Medium      Assessment and Plan   Assessment:    Martin Anguiano is a 57 year old male with significant past medical history for obesity, DM type II, HTN, HLD, JOJO, seasonal allergies (fall) and asthma who is presenting as a new patient after ER evaluation for foreign body sensation/concern for food impaction.    Jose was recently seen in the ER 10/11/2022 for concern of possible esophageal foreign body.  At this time it was reported that he was eating a sandwich that he subsequently began to choke with difficulty breathing and to relieve the sensation his wife had to perform the Heimlich maneuver.  The patient was given EZ gas crystals in the sensation had subsided.    #Sigmoid Diverticulitis   Complicated by microperforation requiring hospitilization treated conservatively 2/2022. Last colonoscopy 2018 with hyperplastic polyps.     -- Lower endoscopy     #Dysphagia    Pertinent history for atopic disorders including seasonal allergies and asthma.  Strong family history also of asthma.  Symptoms on presentation seem to be esophageal in nature. Today Jose reports 2-3 year history of solid food dysphagia. Symptoms have not been progressive and there are no additional alarm signs present. This is however in setting of intermittent heartburn well-controlled with as needed Tums/Prilosec on going for the past 10+ years.      Differential includes eosinophilic esophagitis, reflux esophagitis, erosive esophagitis, structural abnormality  however suspicion is low vs intrinsic motility disorder of the esophagus.      -- Upper endoscopy     #Dysphonia    #Chronic Cough  #Nasal Congestion     -- Appointment with ENT     Plan:  Appointment with ENT for dysphonia (voice changes), chronic cough/congestion  Will schedule for both upper and lower endoscopy  Please do not take over the counter PPI (Protonix Pump Inhibitor) Prilosec prior to procedure   Okay to use TUMs, Mylanta or Pepto Bismol   Continue fiber supplementation          Follow up plan:   Return to clinic 3 months w/ Emily BRUNNER and as needed.    The risks and benefits of my recommendations, as well as other treatment options were discussed with the patient and any available family today. All questions were answered.     o Follow up: As planned above. Today, I personally spent 31 minutes in direct face to face time with the patient, of which greater than 50% of the time was spent in patient education and counseling as described above. Approximately 15 minutes were spent on indirect care associated with the patient's consultation including but not limited to review of: patient medical records to date, clinic visits, hospital records, lab results, imaging studies, procedural documentation, and coordinating care with other providers. The findings from this review are summarized in the above note. All of the above accounted for a cumulative time of 46 minutes and was performed on the date of service.     The patient verbalized understanding of the plan and was appreciative for the time spent and information provided during the office visit.     Emily Jimenez PA-C  Division of Gastroenterology, Hepatology, and Nutrition  [facility]       I spoke with and evaluated the patient, participating in the key portions of the service. I reviewedEmily Jimenez PA-C's note. I agree with the Emily Jimenez PA-C's findings and plan. The note was reviewed and edited as needed.     The patient was seen  via telemedicine consultation regarding his symptoms of intermittent dysphagia and foreign body sensation.  Because of his symptoms I recommended an upper endoscopy.  It is possible that he has eosinophilic esophagitis or simply erosive esophagitis or nonerosive esophagitis that is contributing to his symptoms.  Following endoscopy with biopsy of the distal and proximal esophagus for EOE treatment will be started.  This will be directed at the underlying pathology be it reflux or eosinophilic esophagitis.  If nonresponsive to therapy Future directions could include high-resolution esophageal manometry and/or pH impedance.  The patient should not start any reflux medications until after endoscopy if warranted.  Because of his diverticulitis he should undergo colonoscopy as well.  He has noted some voice changes.  We recommended that he be evaluated by ENT.  This is likely related to his recent tobacco use and he was encouraged to stop.    Socrates Duarte DO   of Medicine  Director, Esophageal Disorders Program  Division of Gastroenterology, Hepatology, and Nutrition  [facility]           Documentation assisted by voice recognition and documentation system.

## 2022-11-01 NOTE — PATIENT INSTRUCTIONS
It was a pleasure taking care of you today.  I've included a brief summary of our discussion and care plan from today's visit below.  Please review this information with your primary care provider.  _______________________________________________________________________    My recommendations are summarized as follows:    Appointment with ENT for dysphonia (voice changes), chronic cough/congestion  Will schedule for both upper and lower endoscopy  Please do not take over the counter PPI (Protonix Pump Inhibitor) Prilosec prior to procedure   Okay to use TUMs, Mylanta or Pepto Bismol   Continue fiber supplementation       GERD Lifestyle Modifications:   If taking acid suppression therapy (PPI) it should be taken 30 - 60 minutes prior to meals on an empty stomach to have maximum effect  Avoid triggers for reflux such as coffee, chocolate, carbonated beverages, spicy foods, acidic foods (tomato based/citrus and foods with high fat content   Abstinence from alcohol and cessation of all tobacco products is recommended   Studies have shown that weight loss, exercise and maintaining a healthy BMI significantly reduce GERD symptoms   Remain upright while eating and immediately after meals  Do not eat or drink at least 3 hours prior to laying down supine/laying down for bed   Avoid late night/middle of the night snacking    Consider obtaining a wedge pillow or elevating the head end of the bed while sleeping   Avoid sleeping right side down as this can place the lower part of the esophagus/lower esophageal sphincter in a dependent position that favors reflux   Attempting to eat smaller more frequent meals may improve symptoms       To schedule endoscopic procedures you may call: [phone]  To schedule radiology (imaging) tests you may call: [phone]  To schedule an ENT appointment you may call: [phone]    Please call my nurse Shelia ([phone]), Lennie ([phone]) with any questions or concerns.      Return to  GI Clinic in 3 months to review your progress.    _______________________________________________________________________    Who do I call with any questions after my visit?  Please be in touch if there are any further questions that arise following today's visit.  There are multiple ways to contact your gastroenterology care team.      During business hours, you may reach a Gastroenterology nurse at [phone] and choose option 3.       To schedule or reschedule an appointment, please call [phone].     You can always send a secure message through [employer].  [employer] messages are answered by your nurse or doctor typically within 24 hours.  Please allow extra time on weekends and holidays.      For urgent/emergent questions after business hours, you may reach the on-call GI Fellow by contacting the [facility] at [phone].     How will I get the results of any tests ordered?    You will receive all of your results.  If you have signed up for [facility], any tests ordered at your visit will be available to you after your physician reviews them.  Typically this takes 1-2 weeks.  If there are urgent results that require a change in your care plan, your physician or nurse will call you to discuss the next steps.      What is [employer]?  [employer] is a secure way for you to access all of your healthcare records from the [facility].  It is a web based computer program, so you can sign on to it from any location.  It also allows you to send secure messages to your care team.  I recommend signing up for [employer] access if you have not already done so and are comfortable with using a computer.      How to I schedule a follow-up visit?  If you did not schedule a follow-up visit today, please call [phone] to schedule a follow-up office visit.      If you feel you received exceptional care and are interested in supporting the clinical and research goals of Dr. Duarte/Emily Jimenez or the  Division of Gastroenterology, Hepatology, and Nutrition please contact [email or web address] from the [facility] to discuss opportunities to donate.    Sincerely,    Emily Jimenez PA-C  Division of Gastroenterology, Hepatology, and Nutrition  [facility]

## 2022-11-01 NOTE — LETTER
11/1/2022         RE: Martin Anguiano  [address]  [facility] [address]        Dear Colleague,    Thank you for referring your patient, Martin Anguiano, to the [facility] [place]. Please see a copy of my visit note below.    Gastroenterology Visit for: Martin Anguiano 1965   MRN: [id number]     Reason for Visit:  chief complaint    Referred by: Self  / No address on file  Patient Care Team:  Johana Tamez MD as PCP - General (Family Practice)  Johana Tamez MD as Assigned PCP  Umang Villafana MD as Resident (House Physician)  Goltz, Bennett Ezra, PA-C as Assigned Neuroscience Provider  Zachary Lynn MD as Assigned Surgical Provider  Nakia Cabrera [facility] as Assigned MTM Pharmacist  Socrates Duarte DO as MD (Gastroenterology)  Isaiah Guy MD as Assigned Musculoskeletal Provider    History of Present Illness:   Martin Anguiano is a 57 year old male with significant past medical history for obesity, DM type II, HTN, HLD, JOJO, seasonal allergies (fall) and asthma who is presenting as a new patient after ER evaluation for foreign body sensation/concern for food impaction.    Jose was recently seen in the ER 10/11/2022 for concern of possible esophageal foreign body.  At this time it was reported that he was eating a sandwich that he subsequently began to choke with difficulty breathing and to relieve the sensation his wife had to perform the Heimlich maneuver.  The patient was given EZ gas crystals in the sensation had subsided.  -------------------------------------------------------------------------------------------------    Today he reports that prior to going to the ER he was eating a sandwhich with meat. He reports coughing and followed by the sensation of food becoming stuck. His wife attempted the Heimlich which helped relieve the pressure. However following he was unable to swallow water which is what brought him to the  ER.    Over the past 2 - 3 years with specific foods (bread) he feels as if when eating the food is slowly passing through his esophagus. With sips of water this sensation improves. Over the past month has been eating small bites of meats and chewing thoroughly.     Reports having heartburn that can occur 1-2 times per week that is resolved with TUMS. He has taken Prilosec in the past and will take this prior to eating a meal that he knows will result in heartburn symptoms. Specifically he notes pizza or BBQ. Symptoms of heartburn have been on going for the past 10 years. 5 years prior had started using his CPAP with which he reports to be significant improvement.     Today a significant rapines was noted while speaking with the pt. He reports that he had stopped smoking 6 months and voice has sounded raspinesss since. His wife reports that this is cyclical for him however and occurs yearly. Typically the symptoms last for months at a time. In addition she reports that he has chronic daily coughing. Per the pt this is in the mornings after using CPAP. The cough is productive. After the use of the CPAP he also reports nasal congestion.     Currently he is taking fiber pills after his episode of diverticulitis to prevent constipation.      Denies weight loss, nausea, emesis, odynophagia, regurgitation, abdominal pain, bloating/fullness, post prandial bloating, diarrhea, constipation, incontinence, melena, hematochezia and BRBR.     No NSIADs or Tylenol. No use of OTC herbal supplements/weight loss products.      Drinks ETOH once or twice per drink. Intermittent use of Marijuana.     No family history or GI related malignancy (esophageal, gastric, pancreatic, liver or colon).     He is 1 of 9 kids and over half have asthma. Both of his children have asthma.     No prior upper endoscopy.       Wt Readings from Last 5 Encounters:   11/01/22 108.9 kg (240 lb)   04/13/22 119.1 kg (262 lb 8 oz)   08/24/21 122.1 kg (269 lb  1.6 oz)   07/13/21 124.7 kg (275 lb)   05/25/21 130.6 kg (288 lb)        Esophageal Questionnaire(s)    BEDQ Questionnaire  BEDQ Questionnaire: How Often Have You Had the Following? 11/1/2022   Trouble eating solid food (meat, bread, vegetables) 2   Trouble eating soft foods (yogurt, jello, pudding) 0   Trouble swallowing liquids 0   Pain while swallowing 0   Coughing or choking while swallowing foods or liquids 1   Total Score: 3     BEDQ Questionnaire: Discomfort/Pain Ratings 11/1/2022   Eating solid food (meat, bread, vegetables) 1   Eating soft foods (yogurt, jello, pudding) 0   Drinking liquid 0   Total Score: 1       Eckardt Questionnaire  Eckardt Questionnaire 11/1/2022   Dysphagia 1   Regurgitation 0   Retrosternal Pain 0   Weight Loss (kg) 0   Total Score:  1       Promis 10 Questionnaire  PROMIS 10 FLOWSHEET DATA 11/1/2022   In general, would you say your health is: 4   In general, would you say your quality of life is: 4   In general, how would you rate your physical health? 4   In general, how would you rate your mental health, including your mood and your ability to think? 4   In general, how would you rate your satisfaction with your social activities and relationships? 4   In general, please rate how well you carry out your usual social activities and roles. (This includes activities at home, at work and in your community, and responsibilities as a parent, child, spouse, employee, friend, etc.) 4   To what extent are you able to carry out your everyday physical activities such as walking, climbing stairs, carrying groceries, or moving a chair? 5   In the past 7 days, how often have you been bothered by emotional problems such as feeling anxious, depressed, or irritable? 1   In the past 7 days, how would you rate your fatigue on average? 2   In the past 7 days, how would you rate your pain on average, where 0 means no pain, and 10 means worst imaginable pain? 0   Mental health question re-calculation -  no clinical value 5   Physical health question re-calculation - no clinical value 4   Pain question re-calculation - no clinical value 5   Global Mental Health Score 17   Global Physical Health Score 18   PROMIS TOTAL - SUBSCORES 35           STUDIES & PROCEDURES:    EGD:       Colonoscopy:    7/2018   Impression:       - The examined portion of the ileum was normal.                             - One 4 mm polyp at the recto-sigmoid colon,                             removed with a cold snare. Resected and retrieved.                             - The examination was otherwise normal on direct                             and retroflexion views.     FINAL DIAGNOSIS:   Colon, rectosigmoid, polypectomy:   - Hyperplastic polyp     Recall recommended 10 years      EndoFLIP directed at the UES or LES (8cm (EF-325) balloon length or 16cm (EF-322) balloon length):   Date:  8cm balloon  Balloon inflation Balloon pressure CSA (mm^2) DI (mm^2/mmHg) Dmin (mm) Compliance   20 (ladmark ID)        30        40        50           16cm balloon  Balloon inflation Balloon pressure CSA (mm^2) DI (mm^2/mmHg) Dmin (mm) Compliance   30 (ladmark ID)        40        50        60        70           High Resolution Manometry:    PH/Impedance:       Bravo:      CT:    2/2022 CT AP W Contrast   IMPRESSIONS:   1. Small amount pneumoperitoneum is present within the, consistent with bowel   perforation.   2. There is a cluster small-bowel loops in the midline pelvis with severe wall   thickening and surrounding infiltration of the mesenteric fat seen. Small bowel   ischemia, enteritis, or vasculitis should be considered.   3. Moderate diverticulosis of the sigmoid colon is present with wall thickening   noted. The wall thickening may be secondary to the adjacent small bowel   inflammatory changes. Diverticulitis is considered less likely but cannot be   completely excluded.       Esophagram:      Prior medical records were reviewed including, but  not limited to, notes from referring providers, lab work, radiographic tests, and other diagnostic tests. Pertinent results were summarized above.     History     Past Medical History:   Diagnosis Date     HTN (hypertension)      Hypercholesteremia      JOJO (obstructive sleep apnea)      Pre-diabetes        Past Surgical History:   Procedure Laterality Date     NO HISTORY OF SURGERY         Social History     Socioeconomic History     Marital status: Single     Spouse name: Not on file     Number of children: Not on file     Years of education: Not on file     Highest education level: Not on file   Occupational History     Not on file   Tobacco Use     Smoking status: Former     Packs/day: 1.00     Years: 20.00     Pack years: 20.00     Types: Cigarettes     Quit date: 10/4/2016     Years since quittin.0     Smokeless tobacco: Never     Tobacco comments:     last 10 years were off and on, about 2016   Vaping Use     Vaping Use: Never used   Substance and Sexual Activity     Alcohol use: Yes     Alcohol/week: 0.0 standard drinks     Comment: 1-2 times per week, 2 drinks     Drug use: No     Sexual activity: Yes     Partners: Female   Other Topics Concern     Parent/sibling w/ CABG, MI or angioplasty before 65F 55M? Yes   Social History Narrative     Not on file     Social Determinants of Health     Financial Resource Strain: Not on file   Food Insecurity: Not on file   Transportation Needs: Not on file   Physical Activity: Not on file   Stress: Not on file   Social Connections: Not on file   Intimate Partner Violence: Not on file   Housing Stability: Not on file       Family History   Problem Relation Age of Onset     Glaucoma Mother      Diabetes Mother      Hypertension Father      Hyperlipidemia Father      Diabetes Brother      Coronary Artery Disease No family hx of      Cerebrovascular Disease No family hx of      Breast Cancer No family hx of      Colon Cancer No family hx of      Prostate Cancer No  family hx of      Other Cancer No family hx of      Depression No family hx of      Anxiety Disorder No family hx of      Mental Illness No family hx of      Substance Abuse No family hx of      Anesthesia Reaction No family hx of      Asthma No family hx of      Osteoporosis No family hx of      Genetic Disorder No family hx of      Thyroid Disease No family hx of      Obesity No family hx of      Unknown/Adopted No family hx of      Family history reviewed and edited as appropriate    Medications and Allergies:     Outpatient Encounter Medications as of 11/1/2022   Medication Sig Dispense Refill     ADVAIR -21 MCG/ACT inhaler INHALE 2 PUFFS INTO THE LUNGS TWICE DAILY 12 g 1     albuterol (PROAIR HFA/PROVENTIL HFA/VENTOLIN HFA) 108 (90 Base) MCG/ACT inhaler INHALE 2 PUFFS INTO THE LUNGS EVERY 6 HOURS AS NEEDED FOR SHORTNESS OF BREATH OR DIFFICULT BREATHING OR WHEEZING 6.7 g 4     aspirin (ASA) 81 MG chewable tablet Take 1 tablet (81 mg) by mouth daily 100 tablet 0     atorvastatin (LIPITOR) 40 MG tablet TAKE 1 TABLET(40 MG) BY MOUTH DAILY 90 tablet 1     blood glucose (NO BRAND SPECIFIED) lancets standard Use to test blood sugar 2 times daily or as directed. 100 each 1     blood glucose (NO BRAND SPECIFIED) test strip Use to test blood sugar 2 times daily or as directed. 100 strip 1     blood glucose monitoring (NO BRAND SPECIFIED) meter device kit Use to test blood sugar 2 times daily or as directed. 1 kit 0     ciprofloxacin (CIPRO) 500 MG tablet Take 1 tablet (500 mg) by mouth 2 times daily 20 tablet 0     fluticasone (FLONASE) 50 MCG/ACT nasal spray SHAKE LIQUID AND USE 1 SPRAY IN EACH NOSTRIL DAILY 48 g 2     Fluticasone-Salmeterol (ADVAIR HFA IN)        ipratropium - albuterol 0.5 mg/2.5 mg/3 mL (DUONEB) 0.5-2.5 (3) MG/3ML neb solution USE 1 VIAL VIA NEBULIZER EVERY 4 HOURS AS NEEDED FOR SHORTNESS OF BREATH, DYSPNEA,OR WHEEZING 360 mL 2     losartan (COZAAR) 100 MG tablet TAKE 1 TABLET(100 MG) BY MOUTH  "DAILY 90 tablet 0     metFORMIN (GLUCOPHAGE-XR) 500 MG 24 hr tablet TAKE 1 TABLET(500 MG) BY MOUTH once  DAILY WITH MEALS 90 tablet 1     metroNIDAZOLE (FLAGYL) 500 MG tablet Take 1 tablet (500 mg) by mouth 3 times daily 30 tablet 0     Multiple Vitamin (MULTIVITAMIN ADULT PO) Take 1 tablet by mouth daily       Facility-Administered Encounter Medications as of 11/1/2022   Medication Dose Route Frequency Provider Last Rate Last Admin     betamethasone acet & sod phos (CELESTONE) injection 6 mg  6 mg   Isaiah Guy MD   6 mg at 10/19/22 1418     betamethasone acet & sod phos (CELESTONE) injection 6 mg  6 mg   Isaiah Guy MD   6 mg at 10/19/22 1418        Allergies   Allergen Reactions     Ibuprofen      Other reaction(s): Other - Describe In Comment Field  Uri type of symptoms; \"watery eyes\". Gel caps are  OK  Eyes water,runny nose     Lisinopril Cough        Review of systems:  A full 10 point review of systems was obtained and was negative except for the pertinent positives and negatives stated within the HPI.    Objective Findings:   Physical Exam:    Constitutional: Wt 108.9 kg (240 lb)   BMI 33.57 kg/m    General: Alert, cooperative, no distress, well-appearing  Head: Atraumatic, normocephalic, no obvious abnormalities   Eyes: Sclera anicteric, no obvious conjunctival hemorrhage   Nose: Nares normal, no obvious malformation, no obvious rhinorrhea   Respiratory: normal appearing respirations, no cough  Skin: No jaundice, no obvious rash  Neurologic: AAOx3, no obvious neurologic abnormality  Psychiatric: Normal Affect, appropriate mood     Labs, Radiology, Pathology     Lab Results   Component Value Date    WBC 9.8 02/28/2019    HGB 15.5 02/28/2019     02/28/2019    CHOL 111 04/13/2022    CHOL 139 01/15/2021    CHOL 137 08/19/2019    TRIG 113 04/13/2022    TRIG 114 01/15/2021    TRIG 82 08/19/2019    HDL 43 04/13/2022    HDL 50 01/15/2021    HDL 48 08/19/2019    ALT 51 04/13/2022    " ALT 56 01/15/2021    ALT 62 11/26/2019    AST 28 04/13/2022    AST 27 01/15/2021    AST 34 11/26/2019     04/13/2022     07/13/2021     05/25/2021    BUN 16 04/13/2022    BUN 14 07/13/2021    BUN 19 05/25/2021    CO2 25 04/13/2022    CO2 23 07/13/2021    CO2 25 05/25/2021        Liver Function Studies -   Recent Labs   Lab Test 04/13/22  0935   PROTTOTAL 8.2   ALBUMIN 4.2   BILITOTAL 0.8   ALKPHOS 86   AST 28   ALT 51          Patient Active Problem List    Diagnosis Date Noted     Esophageal dysphagia 11/03/2022     Priority: Medium     Diverticulitis of colon 11/03/2022     Priority: Medium     Heartburn 11/03/2022     Priority: Medium     Dysphonia 11/03/2022     Priority: Medium     Diabetes mellitus, type 2 (H) 07/13/2021     Priority: Medium     Elevated coronary artery calcium score 07/13/2021     Priority: Medium     Elevated fasting glucose 05/25/2021     Priority: Medium     Obesity (BMI 35.0-39.9) with comorbidity (H) 10/09/2018     Priority: Medium     Cellulitis and abscess of leg-healing and closing  10/25/2016     Priority: Medium     Family history of diabetes mellitus 10/21/2016     Priority: Medium     Glucose intolerance (impaired glucose tolerance) 10/21/2016     Priority: Medium     Non morbid obesity due to excess calories 10/21/2016     Priority: Medium     Screening for prostate cancer 10/21/2016     Priority: Medium     Mixed hyperlipidemia 10/21/2016     Priority: Medium     Tobacco use disorder 19-48y/o @ 1.5ppd for 15 yrs then 1ppd=31-32 pk yr hx 10/21/2016     Priority: Medium     Benign essential hypertension 03/31/2016     Priority: Medium     JOJO (obstructive sleep apnea) 03/31/2016     Priority: Medium     Chronic coronary artery disease 12/17/2014     Priority: Medium     Overview:   By 12/2014 heart scan.       Impotence of organic origin 08/06/2013     Priority: Medium     History of vasectomy 05/24/2013     Priority: Medium     Vitamin D deficiency 04/11/2011      Priority: Medium     Family history of coronary artery disease 03/01/2011     Priority: Medium     Gastroesophageal reflux disease 07/22/2009     Priority: Medium     Hypercholesterolemia 07/22/2009     Priority: Medium      Assessment and Plan   Assessment:    Martin Anguiano is a 57 year old male with significant past medical history for obesity, DM type II, HTN, HLD, JOJO, seasonal allergies (fall) and asthma who is presenting as a new patient after ER evaluation for foreign body sensation/concern for food impaction.    Jose was recently seen in the ER 10/11/2022 for concern of possible esophageal foreign body.  At this time it was reported that he was eating a sandwich that he subsequently began to choke with difficulty breathing and to relieve the sensation his wife had to perform the Heimlich maneuver.  The patient was given EZ gas crystals in the sensation had subsided.    #Sigmoid Diverticulitis   Complicated by microperforation requiring hospitilization treated conservatively 2/2022. Last colonoscopy 2018 with hyperplastic polyps.     -- Lower endoscopy     #Dysphagia    Pertinent history for atopic disorders including seasonal allergies and asthma.  Strong family history also of asthma.  Symptoms on presentation seem to be esophageal in nature. Today Jose reports 2-3 year history of solid food dysphagia. Symptoms have not been progressive and there are no additional alarm signs present. This is however in setting of intermittent heartburn well-controlled with as needed Tums/Prilosec on going for the past 10+ years.      Differential includes eosinophilic esophagitis, reflux esophagitis, erosive esophagitis, structural abnormality however suspicion is low vs intrinsic motility disorder of the esophagus.      -- Upper endoscopy     #Dysphonia    #Chronic Cough  #Nasal Congestion     -- Appointment with ENT     Plan:  Appointment with ENT for dysphonia (voice changes), chronic cough/congestion  Will schedule for  both upper and lower endoscopy  Please do not take over the counter PPI (Protonix Pump Inhibitor) Prilosec prior to procedure   Okay to use TUMs, Mylanta or Pepto Bismol   Continue fiber supplementation          Follow up plan:   Return to clinic 3 months w/ Emily BRUNNER and as needed.    The risks and benefits of my recommendations, as well as other treatment options were discussed with the patient and any available family today. All questions were answered.     o Follow up: As planned above. Today, I personally spent 31 minutes in direct face to face time with the patient, of which greater than 50% of the time was spent in patient education and counseling as described above. Approximately 15 minutes were spent on indirect care associated with the patient's consultation including but not limited to review of: patient medical records to date, clinic visits, hospital records, lab results, imaging studies, procedural documentation, and coordinating care with other providers. The findings from this review are summarized in the above note. All of the above accounted for a cumulative time of 46 minutes and was performed on the date of service.     The patient verbalized understanding of the plan and was appreciative for the time spent and information provided during the office visit.     Emily Jimenez PA-C  Division of Gastroenterology, Hepatology, and Nutrition  [facility]       I spoke with and evaluated the patient, participating in the key portions of the service. I reviewedEmily Jimenez PA-C's note. I agree with the Emily Jimenez PA-C's findings and plan. The note was reviewed and edited as needed.     The patient was seen via telemedicine consultation regarding his symptoms of intermittent dysphagia and foreign body sensation.  Because of his symptoms I recommended an upper endoscopy.  It is possible that he has eosinophilic esophagitis or simply erosive esophagitis or nonerosive esophagitis that is  contributing to his symptoms.  Following endoscopy with biopsy of the distal and proximal esophagus for EOE treatment will be started.  This will be directed at the underlying pathology be it reflux or eosinophilic esophagitis.  If nonresponsive to therapy Future directions could include high-resolution esophageal manometry and/or pH impedance.  The patient should not start any reflux medications until after endoscopy if warranted.  Because of his diverticulitis he should undergo colonoscopy as well.  He has noted some voice changes.  We recommended that he be evaluated by ENT.  This is likely related to his recent tobacco use and he was encouraged to stop.      Socrates Duarte DO   of Medicine  Director, Esophageal Disorders Program  Division of Gastroenterology, Hepatology, and Nutrition  [facility]      Documentation assisted by voice recognition and documentation system.

## 2022-11-03 PROBLEM — R12 HEARTBURN: Status: ACTIVE | Noted: 2022-11-03

## 2022-11-03 PROBLEM — R13.19 ESOPHAGEAL DYSPHAGIA: Status: ACTIVE | Noted: 2022-11-03

## 2022-11-03 PROBLEM — K57.32 DIVERTICULITIS OF COLON: Status: ACTIVE | Noted: 2022-11-03

## 2022-11-03 PROBLEM — R49.0 DYSPHONIA: Status: ACTIVE | Noted: 2022-11-03

## 2022-11-04 ENCOUNTER — MYC MEDICAL ADVICE (OUTPATIENT)
Dept: CALL CENTER | Age: 57
End: 2022-11-04

## 2022-11-04 ENCOUNTER — TELEPHONE (OUTPATIENT)
Dept: GASTROENTEROLOGY | Facility: CLINIC | Age: 57
End: 2022-11-04

## 2022-11-04 NOTE — TELEPHONE ENCOUNTER
Screening Questions  BLUE  KIND OF PREP RED  LOCATION [review exclusion criteria] GREEN  SEDATION TYPE        Y Are you active on mychart?       AFFELDT Ordering/Referring Provider?        [employer] What type of coverage do you have?      N Have you had a positive covid test in the last 90 days?        Date:    1. 33.9  BMI  [BMI 40+ - review exclusion criteria]  2. Y  Are you able to give consent for your medical care? [RN REVIEW?]        3. N  Are you taking any prescription pain medications on a routine schedule?        3a.  EXTENDED PREP What kind of prescription?   4. Y Do you have any chemical dependencies such as alcohol, street drugs, or methadone?  SOME MARIJUANA    5. N Do you have any history of post-traumatic stress syndrome, severe anxiety or history of psychosis?      **If yes 2- 5 , please schedule with MAC sedation.**    BMI OVER 40 NEED PAC EVALUATION FOR [facility]          IF YES TO ANY 6 - 10 - HOSPITAL SETTING ONLY.     6.   N Do you need assistance transferring?     7.   N Have you had a heart or lung transplant?    8.   N Are you currently on dialysis?   9.   N Do you use daily home oxygen?   10. N Do you take nitroglycerin?   10a.  If yes, how often?     11. [FEMALES]   Are you currently pregnant?    11a.  If yes, how many weeks? [ Greater than 12 weeks, OR NEEDED]    12. N Do you have Pulmonary Hypertension? *NEED PAC APPT AT [facility]*     13. N [review exclusion criteria]  Do you have any implantable devices in your body (pacemaker, defib, LVAD)?    14. N In the past 6 months, have you had any heart related issues including cardiomyopathy or heart attack?     14a.  If yes, did it require cardiac stenting if so when?     15. N Have you had a stroke or Transient ischemic attack (TIA - aka  mini stroke ) within 6 months?      16. Y-USES CPAP Do you have mod to severe Obstructive Sleep Apnea?  [Hospital only - Ok at [facility]]    17. N Do you have SEVERE AND UNCONTROLLED asthma?     18. N Are you  "currently taking any blood thinners?     18a. If yes, inform patient to \"follow up w/ ordering provider for bridging instructions.\"    19. N Do you take the medication Phentermine?    19a. If yes, \"Hold for 7 days before procedure.  Please consult your prescribing provider if you have questions about holding this medication.\"     20. N  Do you have chronic kidney disease?      21. Y  Do you have a diagnosis of diabetes?     22. N  On a regular basis do you go 3-5 days between bowel movements?     23.  Preferred LOCAL Pharmacy for Pre Prescription    [ LIST ONLY ONE PHARMACY]    [id number] - [place], [place] - [address] AT [facility] [place] [id number] - [address]        - CLOSING REMINDERS -    Informed patient they will need an adult    Cannot take any type of public or medical transportation alone    Conscious Sedation- Needs  for 6 hours after the procedure       MAC/General-Needs  for 24 hours after procedure    Pre-Procedure Covid test to be completed [[facility] PCR Testing Required]    Confirmed Nurse will call to complete pre-assessment       - SCHEDULING DETAILS -     RACHEAL  Surgeon    12/29  Date of Procedure  Upper and Lower Endoscopy [EGD and Colonoscopy]  Type of Procedure Scheduled   [facility]-If you answer yes to questions #8, #20, #21Which Colonoscopy Prep was Sent?     MAC per order/screen  Sedation Type     Y PAC / Pre-op Required         Additional comments:        "

## 2022-11-04 NOTE — PROGRESS NOTES
Hand Therapy Initial Evaluation    Current Date:  11/8/2022  Referring Provider: Isaiah Guy MD MD Order Date: 10/19/2022  MD Note: bilateral first cmc djd. please provide hand based splint if desired    Diagnosis: Bilateral thumb pain (the right is worse than the left)  DOI: 2-3 years ago    Subjective:  Martin Anguiano is a 57 year old male.    Patient reports symptoms of the bilateral thumbs which occurred due to use over time. Since onset symptoms are Gradually getting better.  General health as reported by patient is good.  Pertinent medical history includes:Asthma, Diabetes, High Blood Pressure, Overweight, Smoking, Numbness in Perianal Region  Medical allergies:none.  Surgical history: none.  Medication history: High Blood Pressure.    Current occupation is a contractor  Job Tasks: Computer Work, Driving, Lifting, Carrying    Occupational Profile Information:  Right hand dominant  Prior functional level:  independent-shared household chores  Patient reports symptoms of pain, weakness/loss of strength and numbness  Special tests:  x-ray.    Previous treatment: injection shot, THC/CBD cream  Transportation: drives  Currently working in normal job without restrictions  Leisure activities/hobbies: working    Functional Outcome Measure:   Upper Extremity Functional Index Score:  SCORE:   Column Totals: /80: 58   (A lower score indicates greater disability.)    Objective:  Pain Level (Scale 0-10)   11/8/2022   At Rest 0/10   With Use 8-9/10     Pain Description  Date 11/8/2022   Location B CMC joints   Pain Quality Aching, Dull, Sharp and Shooting   Frequency intermittent     Pain is worst  daytime   Exacerbated by  buttoning, opening jars/bottles   Relieved by injection shot, THC/CBD cream   Progression Gradually improving since injection about 2 weeks     ROM  Thumb 11/8/2022 11/8/2022   AROM  (PROM) R L   MP /58 /55   IP /54 /64   RABD 35 33   PABD 42 33   K[place]andji Opposition Scale (0-10/10) 9 9     Thumb  Observation/Appearance   - none  + mild    ++ moderate    +++ severe     11/8/2022   Shoulder deformity present over CMC R: +  L: +   Edema over the CMC joint R: -  L: -   Noted collapse of MP into hyperextension during pinch R: +  L: +      Provocative Tests  Pain Report:  - none    + mild    ++ moderate    +++ severe     11/8/2022   Crepitus present R: -  L: -   CMC Adduction Stress Test R: -  L: +   CMC Extension Stress Test R: +  L: -   Finkelstein's R: +  L: +   WHAT Test R: +  L: +       Strength   (Measured in pounds)  Pain Report: - none  + mild    ++ moderate    +++ severe    11/8/2022 11/8/2022   Trials R L   1  2  3 85 83   Average 85 83     Lat Pinch 11/8/2022 11/8/2022   Trials R L   1  2  3 19 23   Average 19 23     3 Pt Pinch 11/8/2022 11/8/2022   Trials R L   1  2  3 10 5   Average 10 5     Palpation   Pain Report:  - none    + mild    ++ moderate    +++ severe    11/8/2022   CMC Joint Line R: ++  L: +   Thenar Eminence R: +  L: +   Web Space R: -  L: -   1st DC R: +  L: -   Radial Styloid R: +  L: +   FCR R: +  L: +     Assessment:  Patient presents with symptoms consistent with diagnosis of bilateral thumb pain, with conservative intervention.    Patient's limitations or Problem List includes:  Pain, Decreased ROM/motion, Weakness, Sensory disturbance, Decreased stability, Decreased , Decreased pinch and Tightness in musculature of the bilateral thumb which interferes with the patient's ability to perform Self Care Tasks (dressing, buttons), Work Tasks, Sleep Patterns, Recreational Activities, Household Chores and Driving  as compared to previous level of function.    Rehab Potential:  Good - Return to full activity, some limitations    Patient will benefit from skilled Occupational Therapy to increase ROM, flexibility, motion, overall strength,  strength, pinch strength, stability of thumb and sensation and decrease pain and edema to return to previous activity level and resume normal  daily tasks and to reach their rehab potential.    Barriers to Learning:  No barrier    Communication Issues:  Patient appears to be able to clearly communicate and understand verbal and written communication and follow directions correctly.    Chart Review: Chart Review and Simple history review with patient    Identified Performance Deficits: dressing, driving and community mobility, health management and maintenance, home establishment and management, meal preparation and cleanup, sleep, work and leisure activities    Assessment of Occupational Performance:  5 or more Performance Deficits    Clinical Decision Making (Complexity): Low complexity    Treatment Explanation:  The following has been discussed with the patient:    RX ordered/plan of care  Anticipated outcomes  Possible risks and side effects    Plan:  Frequency:  1 X week, once daily  Duration:  for 4 weeks    Treatment Plan:    Modalities:    US and Paraffin   Therapeutic Exercise:    AROM, AAROM, PROM, Tendon Gliding, Blocking, Reverse Blocking, Place and Hold, Isotonics, Isometrics and Stabilization  Therapeutic Activities:   Functional activities   Neuromuscular re-ed:   Nerve Gliding and Kinesiotaping  Manual Techniques:   Joint mobilization, Friction massage, Myofascial release and Manual edema mobilization  Orthotic Fabrication:    Static  Self Care:    Self Care Tasks, Ergonomic Considerations and Work Tasks    Discharge Plan:  Achieve all LTG  [place] in home treatment program.  Reach maximal therapeutic benefit.    Home Program:  Arthritis Tips/Joint Protection  Orthosis Wear and Care  Warmth  [place] Massage to Thumb  Thumb Stabilization Web Space Release Method 1 with Clip  Thumb Stabilization Strengthening Isometric C    Next Visit:  Review HEP  Review orthosis fit  Consider fabricating L orthosis  MFR  Joint mobilization  US/Paraffin

## 2022-11-07 ENCOUNTER — MYC MEDICAL ADVICE (OUTPATIENT)
Dept: FAMILY MEDICINE | Facility: CLINIC | Age: 57
End: 2022-11-07

## 2022-11-07 DIAGNOSIS — Z71.6 ENCOUNTER FOR SMOKING CESSATION COUNSELING: Primary | ICD-10-CM

## 2022-11-07 NOTE — TELEPHONE ENCOUNTER
LS,  Please see below [employer] message and advise.  Do you want to discuss at upcoming visit?  Has had prescribed in the past.  Thanks,  Nicky LARKIN RN

## 2022-11-08 ENCOUNTER — THERAPY VISIT (OUTPATIENT)
Dept: OCCUPATIONAL THERAPY | Facility: CLINIC | Age: 57
End: 2022-11-08
Attending: FAMILY MEDICINE
Payer: COMMERCIAL

## 2022-11-08 DIAGNOSIS — M79.644 BILATERAL THUMB PAIN: ICD-10-CM

## 2022-11-08 DIAGNOSIS — M79.645 BILATERAL THUMB PAIN: ICD-10-CM

## 2022-11-08 PROCEDURE — 97165 OT EVAL LOW COMPLEX 30 MIN: CPT | Mod: GO

## 2022-11-08 PROCEDURE — 97760 ORTHOTIC MGMT&TRAING 1ST ENC: CPT | Mod: GO

## 2022-11-08 PROCEDURE — 97110 THERAPEUTIC EXERCISES: CPT | Mod: GO

## 2022-11-08 NOTE — PROGRESS NOTES
[facility]    OUTPATIENT Occupational Therapy ORTHOPEDIC EVALUATION  PLAN OF TREATMENT FOR OUTPATIENT REHABILITATION  (COMPLETE FOR INITIAL CLAIMS ONLY)  Patient's Last Name, First Name, M.I.  YOB: 1965  Martin Anguiano    Provider s Name:  [facility]   Medical Record No.  [id number]   Start of Care Date:  11/08/22   Onset Date:   10/19/22 (MD Order Date)   Treatment Diagnosis:  Bilateral thumb pain Medical Diagnosis:  Bilateral thumb pain       Goals:     11/08/22 0500   Goal #1   Goal #1 household chores   Previous Performance Level Independent   Current Functional Task    Current Performance Level Moderate Difficulty   STG Target Perfomance Vacuum    STG Target Perform Level Mild Difficulty   Due Date 11/22/22   LTG Target Task/Performance No Difficulty   Due Date 12/06/22       Therapy Frequency:  1x weekly  Predicted Duration of Therapy Intervention:  4 weeks    Katie Phan OT                 I CERTIFY THE NEED FOR THESE SERVICES FURNISHED UNDER        THIS PLAN OF TREATMENT AND WHILE UNDER MY CARE     (Physician attestation of this document indicates review and certification of the therapy plan).                     Certification Date From:  11/08/22   Certification Date To:  12/06/22    Referring Provider:  Isaiah Guy    Initial Assessment        See Epic Evaluation SOC Date: 11/08/22

## 2022-11-16 ENCOUNTER — THERAPY VISIT (OUTPATIENT)
Dept: OCCUPATIONAL THERAPY | Facility: CLINIC | Age: 57
End: 2022-11-16
Payer: COMMERCIAL

## 2022-11-16 DIAGNOSIS — M79.644 BILATERAL THUMB PAIN: Primary | ICD-10-CM

## 2022-11-16 DIAGNOSIS — M79.645 BILATERAL THUMB PAIN: Primary | ICD-10-CM

## 2022-11-16 PROCEDURE — 97763 ORTHC/PROSTC MGMT SBSQ ENC: CPT | Mod: GO

## 2022-11-16 PROCEDURE — 97110 THERAPEUTIC EXERCISES: CPT | Mod: GO

## 2022-11-23 ENCOUNTER — THERAPY VISIT (OUTPATIENT)
Dept: OCCUPATIONAL THERAPY | Facility: CLINIC | Age: 57
End: 2022-11-23
Payer: COMMERCIAL

## 2022-11-23 DIAGNOSIS — M79.645 BILATERAL THUMB PAIN: Primary | ICD-10-CM

## 2022-11-23 DIAGNOSIS — M79.644 BILATERAL THUMB PAIN: Primary | ICD-10-CM

## 2022-11-23 PROCEDURE — 97140 MANUAL THERAPY 1/> REGIONS: CPT | Mod: GO

## 2022-11-23 PROCEDURE — 97110 THERAPEUTIC EXERCISES: CPT | Mod: GO

## 2022-11-28 ENCOUNTER — TELEPHONE (OUTPATIENT)
Dept: OTOLARYNGOLOGY | Facility: CLINIC | Age: 57
End: 2022-11-28

## 2022-12-01 DIAGNOSIS — E11.9 TYPE 2 DIABETES MELLITUS WITHOUT COMPLICATION, WITHOUT LONG-TERM CURRENT USE OF INSULIN (H): ICD-10-CM

## 2022-12-01 NOTE — TELEPHONE ENCOUNTER
"Requested Prescriptions   Pending Prescriptions Disp Refills     metFORMIN (GLUCOPHAGE XR) 500 MG 24 hr tablet [Pharmacy Med Name: METFORMIN ER 500MG 24HR TABS] 90 tablet 1     Sig: TAKE 1 TABLET(500 MG) BY MOUTH EVERY DAY WITH MEALS       Biguanide Agents Failed - 12/1/2022  3:27 AM        Failed - Patient has documented A1c within the specified period of time.     If HgbA1C is 8 or greater, it needs to be on file within the past 3 months.  If less than 8, must be on file within the past 6 months.     Recent Labs   Lab Test 04/13/22  0935   A1C 5.6             Passed - Patient is age 10 or older        Passed - Patient's CR is NOT>1.4 OR Patient's EGFR is NOT<45 within past 12 mos.     Recent Labs   Lab Test 04/13/22  0935 07/13/21  0917 05/25/21  1043   GFRESTIMATED >90   < > >90   GFRESTBLACK  --   --  >90    < > = values in this interval not displayed.       Recent Labs   Lab Test 04/13/22  0935   CR 0.88             Passed - Patient does NOT have a diagnosis of CHF.        Passed - Medication is active on med list        Passed - Recent (6 mo) or future (30 days) visit within the authorizing provider's specialty     Patient had office visit in the last 6 months or has a visit in the next 30 days with authorizing provider or within the authorizing provider's specialty.  See \"Patient Info\" tab in inbasket, or \"Choose Columns\" in Meds & Orders section of the refill encounter.               Routing refill request to provider for review/approval because medication did not pass.    Pt had an appointment on 04/13/22    Deepthi Bermudez RN  [facility]   "

## 2022-12-02 RX ORDER — METFORMIN HCL 500 MG
TABLET, EXTENDED RELEASE 24 HR ORAL
Qty: 90 TABLET | Refills: 1 | Status: SHIPPED | OUTPATIENT
Start: 2022-12-02 | End: 2022-12-06

## 2022-12-06 ENCOUNTER — OFFICE VISIT (OUTPATIENT)
Dept: FAMILY MEDICINE | Facility: CLINIC | Age: 57
End: 2022-12-06
Payer: COMMERCIAL

## 2022-12-06 VITALS
TEMPERATURE: 97.4 F | DIASTOLIC BLOOD PRESSURE: 74 MMHG | OXYGEN SATURATION: 98 % | BODY MASS INDEX: 38.18 KG/M2 | HEART RATE: 52 BPM | WEIGHT: 266.7 LBS | HEIGHT: 70 IN | SYSTOLIC BLOOD PRESSURE: 125 MMHG

## 2022-12-06 DIAGNOSIS — E11.9 TYPE 2 DIABETES MELLITUS WITHOUT COMPLICATION, WITHOUT LONG-TERM CURRENT USE OF INSULIN (H): ICD-10-CM

## 2022-12-06 DIAGNOSIS — E66.01 MORBID OBESITY (H): ICD-10-CM

## 2022-12-06 DIAGNOSIS — J45.30 MILD PERSISTENT ASTHMA WITHOUT COMPLICATION: ICD-10-CM

## 2022-12-06 DIAGNOSIS — R21 RASH: ICD-10-CM

## 2022-12-06 DIAGNOSIS — I10 BENIGN ESSENTIAL HYPERTENSION: ICD-10-CM

## 2022-12-06 DIAGNOSIS — Z00.00 ROUTINE GENERAL MEDICAL EXAMINATION AT A HEALTH CARE FACILITY: Primary | ICD-10-CM

## 2022-12-06 DIAGNOSIS — Z23 ENCOUNTER FOR IMMUNIZATION: ICD-10-CM

## 2022-12-06 DIAGNOSIS — M25.532 LEFT WRIST PAIN: ICD-10-CM

## 2022-12-06 DIAGNOSIS — E78.00 HYPERCHOLESTEROLEMIA: ICD-10-CM

## 2022-12-06 DIAGNOSIS — Z71.6 ENCOUNTER FOR SMOKING CESSATION COUNSELING: ICD-10-CM

## 2022-12-06 LAB
ALBUMIN SERPL BCG-MCNC: 4.5 G/DL (ref 3.5–5.2)
ALP SERPL-CCNC: 79 U/L (ref 40–129)
ALT SERPL W P-5'-P-CCNC: 38 U/L (ref 10–50)
ANION GAP SERPL CALCULATED.3IONS-SCNC: 12 MMOL/L (ref 7–15)
AST SERPL W P-5'-P-CCNC: 28 U/L (ref 10–50)
BILIRUB SERPL-MCNC: 0.4 MG/DL
BUN SERPL-MCNC: 18.2 MG/DL (ref 6–20)
CALCIUM SERPL-MCNC: 9.2 MG/DL (ref 8.6–10)
CHLORIDE SERPL-SCNC: 105 MMOL/L (ref 98–107)
CHOLEST SERPL-MCNC: 138 MG/DL
CREAT SERPL-MCNC: 0.95 MG/DL (ref 0.67–1.17)
CREAT UR-MCNC: 54.6 MG/DL
DEPRECATED HCO3 PLAS-SCNC: 22 MMOL/L (ref 22–29)
GFR SERPL CREATININE-BSD FRML MDRD: >90 ML/MIN/1.73M2
GLUCOSE SERPL-MCNC: 103 MG/DL (ref 70–99)
HBA1C MFR BLD: 6.2 % (ref 0–5.6)
HDLC SERPL-MCNC: 55 MG/DL
LDLC SERPL CALC-MCNC: 62 MG/DL
MICROALBUMIN UR-MCNC: <12 MG/L
MICROALBUMIN/CREAT UR: NORMAL MG/G{CREAT}
NONHDLC SERPL-MCNC: 83 MG/DL
POTASSIUM SERPL-SCNC: 4.5 MMOL/L (ref 3.4–5.3)
PROT SERPL-MCNC: 7.4 G/DL (ref 6.4–8.3)
SODIUM SERPL-SCNC: 139 MMOL/L (ref 136–145)
TRIGL SERPL-MCNC: 103 MG/DL

## 2022-12-06 PROCEDURE — 80061 LIPID PANEL: CPT | Performed by: FAMILY MEDICINE

## 2022-12-06 PROCEDURE — 80053 COMPREHEN METABOLIC PANEL: CPT | Performed by: FAMILY MEDICINE

## 2022-12-06 PROCEDURE — 82043 UR ALBUMIN QUANTITATIVE: CPT | Performed by: FAMILY MEDICINE

## 2022-12-06 PROCEDURE — 83036 HEMOGLOBIN GLYCOSYLATED A1C: CPT | Performed by: FAMILY MEDICINE

## 2022-12-06 PROCEDURE — 99396 PREV VISIT EST AGE 40-64: CPT | Mod: 25 | Performed by: FAMILY MEDICINE

## 2022-12-06 PROCEDURE — 99214 OFFICE O/P EST MOD 30 MIN: CPT | Mod: 25 | Performed by: FAMILY MEDICINE

## 2022-12-06 PROCEDURE — 36415 COLL VENOUS BLD VENIPUNCTURE: CPT | Performed by: FAMILY MEDICINE

## 2022-12-06 PROCEDURE — 91312 COVID-19 VACCINE BIVALENT BOOSTER 12+ (PFIZER): CPT | Performed by: FAMILY MEDICINE

## 2022-12-06 PROCEDURE — 0124A COVID-19 VACCINE BIVALENT BOOSTER 12+ (PFIZER): CPT | Performed by: FAMILY MEDICINE

## 2022-12-06 RX ORDER — METFORMIN HCL 500 MG
TABLET, EXTENDED RELEASE 24 HR ORAL
Qty: 180 TABLET | Refills: 1 | Status: SHIPPED | OUTPATIENT
Start: 2022-12-06 | End: 2023-07-18

## 2022-12-06 RX ORDER — ATORVASTATIN CALCIUM 40 MG/1
40 TABLET, FILM COATED ORAL DAILY
Qty: 90 TABLET | Refills: 1 | Status: SHIPPED | OUTPATIENT
Start: 2022-12-06 | End: 2023-09-26

## 2022-12-06 RX ORDER — LOSARTAN POTASSIUM 100 MG/1
100 TABLET ORAL DAILY
Qty: 90 TABLET | Refills: 1 | Status: SHIPPED | OUTPATIENT
Start: 2022-12-06 | End: 2023-06-20

## 2022-12-06 RX ORDER — FLUTICASONE PROPIONATE AND SALMETEROL XINAFOATE 115; 21 UG/1; UG/1
2 AEROSOL, METERED RESPIRATORY (INHALATION) 2 TIMES DAILY
Qty: 12 G | Refills: 3 | Status: SHIPPED | OUTPATIENT
Start: 2022-12-06 | End: 2023-09-26

## 2022-12-06 ASSESSMENT — ENCOUNTER SYMPTOMS
SORE THROAT: 0
ARTHRALGIAS: 1
HEMATOCHEZIA: 0
MYALGIAS: 0
WEAKNESS: 0
FEVER: 0
DYSURIA: 0
HEMATURIA: 0
DIZZINESS: 0
HEADACHES: 0
NAUSEA: 0
EYE PAIN: 0
CHILLS: 0
COUGH: 1
JOINT SWELLING: 0
FREQUENCY: 0
NERVOUS/ANXIOUS: 0
PALPITATIONS: 0
DIARRHEA: 0
SHORTNESS OF BREATH: 0
PARESTHESIAS: 0
HEARTBURN: 0
CONSTIPATION: 0

## 2022-12-06 ASSESSMENT — ASTHMA QUESTIONNAIRES
QUESTION_5 LAST FOUR WEEKS HOW WOULD YOU RATE YOUR ASTHMA CONTROL: COMPLETELY CONTROLLED
QUESTION_1 LAST FOUR WEEKS HOW MUCH OF THE TIME DID YOUR ASTHMA KEEP YOU FROM GETTING AS MUCH DONE AT WORK, SCHOOL OR AT HOME: NONE OF THE TIME
QUESTION_4 LAST FOUR WEEKS HOW OFTEN HAVE YOU USED YOUR RESCUE INHALER OR NEBULIZER MEDICATION (SUCH AS ALBUTEROL): ONCE A WEEK OR LESS
ACT_TOTALSCORE: 23
QUESTION_2 LAST FOUR WEEKS HOW OFTEN HAVE YOU HAD SHORTNESS OF BREATH: NOT AT ALL
QUESTION_3 LAST FOUR WEEKS HOW OFTEN DID YOUR ASTHMA SYMPTOMS (WHEEZING, COUGHING, SHORTNESS OF BREATH, CHEST TIGHTNESS OR PAIN) WAKE YOU UP AT NIGHT OR EARLIER THAN USUAL IN THE MORNING: ONCE OR TWICE
ACT_TOTALSCORE: 23

## 2022-12-06 NOTE — PROGRESS NOTES
SUBJECTIVE:   CC: Jose is an 57 year old who presents for preventative health visit.   Patient has been advised of split billing requirements and indicates understanding: Yes  Healthy Habits:     Getting at least 3 servings of Calcium per day:  Yes    Bi-annual eye exam:  Yes    Dental care twice a year:  Yes    Sleep apnea or symptoms of sleep apnea:  Sleep apnea    Diet:  Diabetic    Frequency of exercise:  2-3 days/week    Duration of exercise:  15-30 minutes    Taking medications regularly:  Yes    Medication side effects:  None    PHQ-2 Total Score: 0    Additional concerns today:  No          1) Type 2 DM   2) needs Chantix for smoking cessation   3) left wrist pain , has it in the right one too  High cholsterol and   HTN      Today's PHQ-2 Score:   PHQ-2 (  [employer]) 2022   Q1: Little interest or pleasure in doing things 0   Q2: Feeling down, depressed or hopeless 0   PHQ-2 Score 0   PHQ-2 Total Score (12-17 Years)- Positive if 3 or more points; Administer PHQ-A if positive -   Q1: Little interest or pleasure in doing things Not at all   Q2: Feeling down, depressed or hopeless Not at all   PHQ-2 Score 0           Social History     Tobacco Use     Smoking status: Former     Packs/day: 1.00     Years: 20.00     Pack years: 20.00     Types: Cigarettes     Quit date: 10/4/2016     Years since quittin.1     Smokeless tobacco: Never     Tobacco comments:     last 10 years were off and on, about 2016   Substance Use Topics     Alcohol use: Yes     Alcohol/week: 0.0 standard drinks     Comment: 1-2 times per week, 2 drinks     If you drink alcohol do you typically have >3 drinks per day or >7 drinks per week? Yes      Alcohol Use 2022   Prescreen: >3 drinks/day or >7 drinks/week? Yes   Prescreen: >3 drinks/day or >7 drinks/week? -   AUDIT SCORE  5     AUDIT - Alcohol Use Disorders Identification Test - Reproduced from the World Health Organization Audit 2001 (Second Edition) 2022   1.  How  often do you have a drink containing alcohol? 2 to 3 times a week   2.  How many drinks containing alcohol do you have on a typical day when you are drinking? 3 or 4   3.  How often do you have five or more drinks on one occasion? Less than monthly   4.  How often during the last year have you found that you were not able to stop drinking once you had started? Never   5.  How often during the last year have you failed to do what was normally expected of you because of drinking? Never   6.  How often during the last year have you needed a first drink in the morning to get yourself going after a heavy drinking session? Never   7.  How often during the last year have you had a feeling of guilt or remorse after drinking? Never   8.  How often during the last year have you been unable to remember what happened the night before because of your drinking? Never   9.  Have you or someone else been injured because of your drinking? No   10. Has a relative, friend, doctor or other health care worker been concerned about your drinking or suggested you cut down? No   TOTAL SCORE 5       Last PSA:   PSA   Date Value Ref Range Status   10/21/2016 0.61 0 - 4 ug/L Final       Reviewed orders with patient. Reviewed health maintenance and updated orders accordingly - Yes  Lab work is in process  Labs reviewed in EPIC  BP Readings from Last 3 Encounters:   12/06/22 125/74   04/13/22 126/75   08/24/21 122/74    Wt Readings from Last 3 Encounters:   12/06/22 121 kg (266 lb 11.2 oz)   11/01/22 108.9 kg (240 lb)   04/13/22 119.1 kg (262 lb 8 oz)                  Patient Active Problem List   Diagnosis     Benign essential hypertension     JOJO (obstructive sleep apnea)     Family history of diabetes mellitus     Glucose intolerance (impaired glucose tolerance)     Non morbid obesity due to excess calories     Screening for prostate cancer     Mixed hyperlipidemia     Tobacco use disorder 19-48y/o @ 1.5ppd for 15 yrs then 1ppd=31-32 pk yr hx      Chronic coronary artery disease     Impotence of organic origin     Family history of coronary artery disease     Gastroesophageal reflux disease     History of vasectomy     Hypercholesterolemia     Vitamin D deficiency     Cellulitis and abscess of leg-healing and closing      Obesity (BMI 35.0-39.9) with comorbidity (H)     Elevated fasting glucose     Diabetes mellitus, type 2 (H)     Elevated coronary artery calcium score     Esophageal dysphagia     Diverticulitis of colon     Heartburn     Dysphonia     Bilateral thumb pain     Past Surgical History:   Procedure Laterality Date     NO HISTORY OF SURGERY         Social History     Tobacco Use     Smoking status: Former     Packs/day: 1.00     Years: 20.00     Pack years: 20.00     Types: Cigarettes     Quit date: 10/4/2016     Years since quittin.1     Smokeless tobacco: Never     Tobacco comments:     last 10 years were off and on, about 2016   Substance Use Topics     Alcohol use: Yes     Alcohol/week: 0.0 standard drinks     Comment: 1-2 times per week, 2 drinks     Family History   Problem Relation Age of Onset     Glaucoma Mother      Diabetes Mother      Hypertension Father      Hyperlipidemia Father      Diabetes Brother      Coronary Artery Disease No family hx of      Cerebrovascular Disease No family hx of      Breast Cancer No family hx of      Colon Cancer No family hx of      Prostate Cancer No family hx of      Other Cancer No family hx of      Depression No family hx of      Anxiety Disorder No family hx of      Mental Illness No family hx of      Substance Abuse No family hx of      Anesthesia Reaction No family hx of      Asthma No family hx of      Osteoporosis No family hx of      Genetic Disorder No family hx of      Thyroid Disease No family hx of      Obesity No family hx of      Unknown/Adopted No family hx of          Current Outpatient Medications   Medication Sig Dispense Refill     albuterol (PROAIR HFA/PROVENTIL  HFA/VENTOLIN HFA) 108 (90 Base) MCG/ACT inhaler INHALE 2 PUFFS INTO THE LUNGS EVERY 6 HOURS AS NEEDED FOR SHORTNESS OF BREATH OR DIFFICULT BREATHING OR WHEEZING 6.7 g 4     aspirin (ASA) 81 MG chewable tablet Take 1 tablet (81 mg) by mouth daily 100 tablet 0     atorvastatin (LIPITOR) 40 MG tablet Take 1 tablet (40 mg) by mouth daily 90 tablet 1     blood glucose (NO BRAND SPECIFIED) lancets standard Use to test blood sugar 2 times daily or as directed. 100 each 1     blood glucose (NO BRAND SPECIFIED) test strip Use to test blood sugar 2 times daily or as directed. 100 strip 1     blood glucose monitoring (NO BRAND SPECIFIED) meter device kit Use to test blood sugar 2 times daily or as directed. 1 kit 0     ciprofloxacin (CIPRO) 500 MG tablet Take 1 tablet (500 mg) by mouth 2 times daily 20 tablet 0     fluticasone (FLONASE) 50 MCG/ACT nasal spray SHAKE LIQUID AND USE 1 SPRAY IN EACH NOSTRIL DAILY 48 g 2     Fluticasone-Salmeterol (ADVAIR HFA IN)        fluticasone-salmeterol (ADVAIR HFA) 115-21 MCG/ACT inhaler Inhale 2 puffs into the lungs 2 times daily 12 g 3     ipratropium - albuterol 0.5 mg/2.5 mg/3 mL (DUONEB) 0.5-2.5 (3) MG/3ML neb solution USE 1 VIAL VIA NEBULIZER EVERY 4 HOURS AS NEEDED FOR SHORTNESS OF BREATH, DYSPNEA,OR WHEEZING 360 mL 2     losartan (COZAAR) 100 MG tablet Take 1 tablet (100 mg) by mouth daily 90 tablet 1     metFORMIN (GLUCOPHAGE XR) 500 MG 24 hr tablet TAKE 1 TABLET(500 MG) BY MOUTH TWICE a   DAY WITH MEALS 180 tablet 1     metroNIDAZOLE (FLAGYL) 500 MG tablet Take 1 tablet (500 mg) by mouth 3 times daily 30 tablet 0     Multiple Vitamin (MULTIVITAMIN ADULT PO) Take 1 tablet by mouth daily       varenicline (CHANTIX CLEMENTINA) 0.5 MG X 11 & 1 MG X 42 tablet Take 0.5 mg tab daily for 3 days, THEN 0.5 mg tab twice daily for 4 days, THEN 1 mg twice daily. 53 tablet 0     varenicline (CHANTIX CLEMENTINA) 0.5 MG X 11 & 1 MG X 42 tablet Take 0.5 mg tab daily for 3 days, THEN 0.5 mg tab twice daily for 4  "days, THEN 1 mg twice daily. 53 tablet 2     Allergies   Allergen Reactions     Ibuprofen      Other reaction(s): Other - Describe In Comment Field  Uri type of symptoms; \"watery eyes\". Gel caps are  OK  Eyes water,runny nose     Lisinopril Cough     Recent Labs   Lab Test 12/06/22  1152 04/13/22  0935 07/13/21  0917 05/25/21  1043 01/15/21  1125   A1C 6.2* 5.6  --  7.1* 6.3*   LDL 62 45  --   --  66   HDL 55 43  --   --  50   TRIG 103 113  --   --  114   ALT 38 51  --   --  56   CR 0.95 0.88   < > 0.90 0.94   GFRESTIMATED >90 >90   < > >90 >90   GFRESTBLACK  --   --   --  >90 >90   POTASSIUM 4.5 4.3   < > 4.5 4.4    < > = values in this interval not displayed.        Reviewed and updated as needed this visit by clinical staff                  Reviewed and updated as needed this visit by Provider                 Past Medical History:   Diagnosis Date     HTN (hypertension)      Hypercholesteremia      JOJO (obstructive sleep apnea)      Pre-diabetes       Past Surgical History:   Procedure Laterality Date     NO HISTORY OF SURGERY         Review of Systems   Constitutional: Negative for chills and fever.   HENT: Positive for congestion. Negative for ear pain, hearing loss and sore throat.    Eyes: Negative for pain and visual disturbance.   Respiratory: Positive for cough. Negative for shortness of breath.    Cardiovascular: Negative for chest pain, palpitations and peripheral edema.   Gastrointestinal: Negative for constipation, diarrhea, heartburn, hematochezia and nausea.   Genitourinary: Negative for dysuria, frequency, genital sores, hematuria, impotence, penile discharge and urgency.   Musculoskeletal: Positive for arthralgias. Negative for joint swelling and myalgias.   Skin: Negative for rash.   Neurological: Negative for dizziness, weakness, headaches and paresthesias.   Psychiatric/Behavioral: Negative for mood changes. The patient is not nervous/anxious.      CONSTITUTIONAL: NEGATIVE for fever, chills, " change in weight  INTEGUMENTARY/SKIN: NEGATIVE for worrisome rashes, moles or lesions  EYES: NEGATIVE for vision changes or irritation  ENT: NEGATIVE for ear, mouth and throat problems  RESP: NEGATIVE for significant cough or SOB  CV: NEGATIVE for chest pain, palpitations or peripheral edema  GI: NEGATIVE for nausea, abdominal pain, heartburn, or change in bowel habits   male: negative for dysuria, hematuria, decreased urinary stream, erectile dysfunction, urethral discharge  MUSCULOSKELETAL: NEGATIVE for significant arthralgias or myalgia  NEURO: NEGATIVE for weakness, dizziness or paresthesias  PSYCHIATRIC: NEGATIVE for changes in mood or affect    OBJECTIVE:   There were no vitals taken for this visit.    Physical Exam  GENERAL: healthy, alert and no distress  EYES: Eyes grossly normal to inspection, PERRL and conjunctivae and sclerae normal  HENT: ear canals and TM's normal, nose and mouth without ulcers or lesions  NECK: no adenopathy, no asymmetry, masses, or scars and thyroid normal to palpation  RESP: lungs clear to auscultation - no rales, rhonchi or wheezes  CV: regular rate and rhythm, normal S1 S2, no S3 or S4, no murmur, click or rub, no peripheral edema and peripheral pulses strong  ABDOMEN: soft, nontender, no hepatosplenomegaly, no masses and bowel sounds normal  MS: no gross musculoskeletal defects noted, no edema  SKIN: no suspicious lesions or rashes  NEURO: Normal strength and tone, mentation intact and speech normal  PSYCH: mentation appears normal, affect normal/bright    Diagnostic Test Results:  Labs reviewed in Epic  Results for orders placed or performed in visit on 12/06/22   HEMOGLOBIN A1C     Status: Abnormal   Result Value Ref Range    Hemoglobin A1C 6.2 (H) 0.0 - 5.6 %   Albumin Random Urine Quantitative with Creat Ratio     Status: None   Result Value Ref Range    Albumin Urine mg/L <12.0 mg/L    Albumin Urine mg/g Cr      Creatinine Urine mg/dL 54.6 mg/dL   Comprehensive metabolic  panel (BMP + Alb, Alk Phos, ALT, AST, Total. Bili, TP)     Status: Abnormal   Result Value Ref Range    Sodium 139 136 - 145 mmol/L    Potassium 4.5 3.4 - 5.3 mmol/L    Chloride 105 98 - 107 mmol/L    Carbon Dioxide (CO2) 22 22 - 29 mmol/L    Anion Gap 12 7 - 15 mmol/L    Urea Nitrogen 18.2 6.0 - 20.0 mg/dL    Creatinine 0.95 0.67 - 1.17 mg/dL    Calcium 9.2 8.6 - 10.0 mg/dL    Glucose 103 (H) 70 - 99 mg/dL    Alkaline Phosphatase 79 40 - 129 U/L    AST 28 10 - 50 U/L    ALT 38 10 - 50 U/L    Protein Total 7.4 6.4 - 8.3 g/dL    Albumin 4.5 3.5 - 5.2 g/dL    Bilirubin Total 0.4 <=1.2 mg/dL    GFR Estimate >90 >60 mL/min/1.73m2   Lipid panel reflex to direct LDL Fasting     Status: Normal   Result Value Ref Range    Cholesterol 138 <200 mg/dL    Triglycerides 103 <150 mg/dL    Direct Measure HDL 55 >=40 mg/dL    LDL Cholesterol Calculated 62 <=100 mg/dL    Non HDL Cholesterol 83 <130 mg/dL    Narrative    Cholesterol  Desirable:  <200 mg/dL    Triglycerides  Normal:  Less than 150 mg/dL  Borderline High:  150-199 mg/dL  High:  200-499 mg/dL  Very High:  Greater than or equal to 500 mg/dL    Direct Measure HDL  Female:  Greater than or equal to 50 mg/dL   Male:  Greater than or equal to 40 mg/dL    LDL Cholesterol  Desirable:  <100mg/dL  Above Desirable:  100-129 mg/dL   Borderline High:  130-159 mg/dL   High:  160-189 mg/dL   Very High:  >= 190 mg/dL    Non HDL Cholesterol  Desirable:  130 mg/dL  Above Desirable:  130-159 mg/dL  Borderline High:  160-189 mg/dL  High:  190-219 mg/dL  Very High:  Greater than or equal to 220 mg/dL       ASSESSMENT/PLAN:   (Z00.00) Routine general medical examination at a health care facility  (primary encounter diagnosis)  Comment: will check labs   Plan: REVIEW OF HEALTH MAINTENANCE PROTOCOL ORDERS            (E11.9) Type 2 diabetes mellitus without complication, without long-term current use of insulin (H)  Comment: is due for the DM type check labs today   Plan: HEMOGLOBIN A1C,  Albumin Random Urine         Quantitative with Creat Ratio, Comprehensive         metabolic panel (BMP + Alb, Alk Phos, ALT, AST,        Total. Bili, TP), Lipid panel reflex to direct         LDL Fasting, metFORMIN (GLUCOPHAGE XR) 500 MG         24 hr tablet        He has been taking the metformin at 500 mg daily instead of 500 mg twice a day and states that is how it was given to him form the pharmacy .  Sent a Rx for Metformin 500 mg BID and recheck his HGb A1 c in 3 months     (I10) Benign essential hypertension  Comment: labs today   Plan: Comprehensive metabolic panel (BMP + Alb, Alk         Phos, ALT, AST, Total. Bili, TP), Lipid panel         reflex to direct LDL Fasting, losartan (COZAAR)        100 MG tablet            (E66.01) Morbid obesity (H)  Comment: healthy diet and exercise recommended , pt has lost over 25 lbs in the past year   Plan: is doing a good job with exercise and healthy eating habits     (Z71.6) Encounter for smoking cessation counseling  Comment: sent a Rx for the Chantix to his pharmacy   Plan: varenicline (CHANTIX CLEMENTINA) 0.5 MG X 11 & 1 MG X         42 tablet        He has taken in the past but relapsed on the smoking     (R21) Rash  Comment: referred to derm   Plan: Adult Dermatology Referral            (Z23) Encounter for immunization  Comment: got his covid booster today   Plan: COVID-19 VACCINE BIVALENT BOOSTER 12+ (PFIZER)            (M25.532) Left wrist pain  Comment: is going to look and find a provider to try THC cream , to see if that could help  Plan: as above     (E78.00) Hypercholesterolemia  Comment: on statin , doing well , no side effects   Plan: atorvastatin (LIPITOR) 40 MG tablet        As above     (J45.30) Mild persistent asthma without complication  Comment: refilled his Advair inhaler   Plan: fluticasone-salmeterol (ADVAIR HFA) 115-21         MCG/ACT inhaler        Asthma is well controlled currently       Patient has been advised of split billing requirements and  "indicates understanding: Yes      COUNSELING:   Reviewed preventive health counseling, as reflected in patient instructions       Regular exercise       Healthy diet/nutrition       Vision screening       Hearing screening      BMI:   Estimated body mass index is 33.57 kg/m  as calculated from the following:    Height as of 4/13/22: 1.801 m (5' 10.9\").    Weight as of 11/1/22: 108.9 kg (240 lb).         He reports that he quit smoking about 6 years ago. His smoking use included cigarettes. He has a 20.00 pack-year smoking history. He has never used smokeless tobacco.        Johana Tamez MD  [facility]  "

## 2022-12-07 ENCOUNTER — MYC MEDICAL ADVICE (OUTPATIENT)
Dept: FAMILY MEDICINE | Facility: CLINIC | Age: 57
End: 2022-12-07

## 2022-12-08 NOTE — TELEPHONE ENCOUNTER
LS,  Please see below [employer] message and advise.  Note is incomplete, so not sure what to pend.  Thanks,  Nicky LARKIN RN

## 2022-12-08 NOTE — TELEPHONE ENCOUNTER
Can you call his pharmacy and tell them I have faxed a Rx for metformin twice a day on December 6th ?  Thanks

## 2022-12-20 RX ORDER — BISACODYL 5 MG
TABLET, DELAYED RELEASE (ENTERIC COATED) ORAL
Qty: 4 TABLET | Refills: 0 | Status: SHIPPED | OUTPATIENT
Start: 2022-12-20 | End: 2023-09-26

## 2022-12-26 ENCOUNTER — OFFICE VISIT (OUTPATIENT)
Dept: FAMILY MEDICINE | Facility: CLINIC | Age: 57
End: 2022-12-26
Payer: COMMERCIAL

## 2022-12-26 VITALS
BODY MASS INDEX: 38.17 KG/M2 | WEIGHT: 266 LBS | HEART RATE: 52 BPM | SYSTOLIC BLOOD PRESSURE: 129 MMHG | DIASTOLIC BLOOD PRESSURE: 81 MMHG | TEMPERATURE: 97.9 F | OXYGEN SATURATION: 93 %

## 2022-12-26 DIAGNOSIS — E66.01 MORBID OBESITY (H): ICD-10-CM

## 2022-12-26 DIAGNOSIS — Z01.818 PREOP GENERAL PHYSICAL EXAM: Primary | ICD-10-CM

## 2022-12-26 DIAGNOSIS — R13.10 DYSPHAGIA, UNSPECIFIED TYPE: ICD-10-CM

## 2022-12-26 DIAGNOSIS — E11.9 TYPE 2 DIABETES MELLITUS WITHOUT COMPLICATION, WITHOUT LONG-TERM CURRENT USE OF INSULIN (H): ICD-10-CM

## 2022-12-26 DIAGNOSIS — E78.00 HYPERCHOLESTEROLEMIA: ICD-10-CM

## 2022-12-26 DIAGNOSIS — J45.30 MILD PERSISTENT ASTHMA WITHOUT COMPLICATION: ICD-10-CM

## 2022-12-26 DIAGNOSIS — Z87.19 HISTORY OF COLONIC DIVERTICULITIS: ICD-10-CM

## 2022-12-26 DIAGNOSIS — G47.33 OSA (OBSTRUCTIVE SLEEP APNEA): ICD-10-CM

## 2022-12-26 DIAGNOSIS — Z86.0100 HISTORY OF COLONIC POLYPS: ICD-10-CM

## 2022-12-26 DIAGNOSIS — I10 BENIGN ESSENTIAL HYPERTENSION: ICD-10-CM

## 2022-12-26 DIAGNOSIS — F17.210 CIGARETTE NICOTINE DEPENDENCE WITHOUT COMPLICATION: ICD-10-CM

## 2022-12-26 PROCEDURE — 99215 OFFICE O/P EST HI 40 MIN: CPT | Performed by: FAMILY MEDICINE

## 2022-12-26 RX ORDER — VARENICLINE TARTRATE 1 MG/1
1 TABLET, FILM COATED ORAL 2 TIMES DAILY
Qty: 180 TABLET | Refills: 1 | Status: SHIPPED | OUTPATIENT
Start: 2022-12-26 | End: 2023-12-05

## 2022-12-26 NOTE — H&P (VIEW-ONLY)
[facility]  [address], [address]  [facility] [address]  Phone: [phone]  Primary Provider: Johana Tamez  Pre-op Performing Provider: WARD FABIAN      PREOPERATIVE EVALUATION:  Today's date: 12/26/2022    Martin Anguiano is a 57 year old male who presents for a preoperative evaluation.    Surgical Information:  Surgery/Procedure: COLONOSCOPY and EGD  Surgery Location:  GI  Surgeon: Titi Navarro MD  Surgery Date: 12/29/2022  Time of Surgery: 7:30 AM   Where patient plans to recover: At home with family  Fax number for surgical facility: Note does not need to be faxed, will be available electronically in Epic.    Type of Anesthesia Anticipated:  MAC    Assessment & Plan     The proposed surgical procedure is considered INTERMEDIATE risk.    Preop general physical exam  He is cleared to proceed with the upper and lower endoscopy procedures under MAC anesthesia as scheduled later this week.  He is going to hold his morning medications (losartan and metformin) prior to this procedure.    Dysphagia, unspecified type  He is cleared to proceed with the upper endoscopy procedure.    History of colonic diverticulitis  This issue has improved since being treated for this earlier this year.    History of colonic polyps  He had a hyperplastic polyp removed on his colonoscopy in 2018.    Type 2 diabetes mellitus without complication, without long-term current use of insulin (H)  Continue metformin.  However, he is going to hold this on the morning of surgery.    Benign essential hypertension  Continue losartan.  However, he is going to hold this on the morning of surgery.    Morbid obesity (H)  He will continue to work on lifestyle modifications to help with weight loss.    Hypercholesterolemia  Continue atorvastatin.    Mild persistent asthma without complication  Stable on Advair and albuterol as needed.    JOJO (obstructive sleep apnea)  This issue has been stable on  CPAP.    Cigarette nicotine dependence without complication  I congratulated him on quitting smoking recently.  He was given a prescription for the maintenance Chantix dose.- varenicline (CHANTIX) 1 MG tablet; Take 1 tablet (1 mg) by mouth 2 times daily               RECOMMENDATION:  APPROVAL GIVEN to proceed with proposed procedure, without further diagnostic evaluation.      40 minutes spent on the date of the encounter doing chart review, review of outside records, review of test results, patient visit and documentation       Subjective     HPI related to upcoming procedure: He had an episode this fall in which she was choking on food.  He has had some mild dysphagia symptoms since that time.  He is scheduled to undergo an upper endoscopy procedure later this week.  He also has a history of a hyperplastic polyp that was removed during his colonoscopy in 2019.  This past year he had a bout of diverticulitis as well.  He is scheduled to undergo a colonoscopy at the time of his EGD.  His medical history significant for type 2 diabetes, hypertension, hyperlipidemia, obesity, mild persistent asthma, obstructive sleep apnea and nicotine dependence.  He quit smoking about 10 days ago and is requesting the maintenance Chantix dose since he will be running out of the starter pack soon.  On 12/6/2022 his hemoglobin A1c was 6.2% and the CMP and cholesterol results were normal.    Preop Questions 12/26/2022   1. Have you ever had a heart attack or stroke? No   2. Have you ever had surgery on your heart or blood vessels, such as a stent placement, a coronary artery bypass, or surgery on an artery in your head, neck, heart, or legs? No   3. Do you have chest pain with activity? No   4. Do you have a history of  heart failure? No   5. Do you currently have a cold, bronchitis or symptoms of other infection? No   6. Do you have a cough, shortness of breath, or wheezing? No   7. Do you or anyone in your family have previous  history of blood clots? UNKNOWN    8. Do you or does anyone in your family have a serious bleeding problem such as prolonged bleeding following surgeries or cuts? No   9. Have you ever had problems with anemia or been told to take iron pills? No   10. Have you had any abnormal blood loss such as black, tarry or bloody stools? No   11. Have you ever had a blood transfusion? No   12. Are you willing to have a blood transfusion if it is medically needed before, during, or after your surgery? Yes   13. Have you or any of your relatives ever had problems with anesthesia? No   14. Do you have sleep apnea, excessive snoring or daytime drowsiness? YES - On CPAP   14a. Do you have a CPAP machine? Yes   15. Do you have any artifical heart valves or other implanted medical devices like a pacemaker, defibrillator, or continuous glucose monitor? No   16. Do you have artificial joints? No   17. Are you allergic to latex? No       Health Care Directive:  Patient does not have a Health Care Directive or Living Will: Discussed advance care planning with patient; information given to patient to review.    Preoperative Review of :   reviewed - controlled substances reflected in medication list.          Review of Systems  CONSTITUTIONAL: NEGATIVE for fever, chills, change in weight  INTEGUMENTARY/SKIN: NEGATIVE for worrisome rashes, moles or lesions  EYES: NEGATIVE for vision changes or irritation  ENT/MOUTH: NEGATIVE for ear, mouth and throat problems  RESP: NEGATIVE for significant cough or SOB  CV: NEGATIVE for chest pain, palpitations or peripheral edema  GI: NEGATIVE for nausea, abdominal pain, heartburn, or change in bowel habits  : NEGATIVE for frequency, dysuria, or hematuria  MUSCULOSKELETAL: NEGATIVE for significant arthralgias or myalgia  NEURO: NEGATIVE for weakness, dizziness or paresthesias  ENDOCRINE: NEGATIVE for temperature intolerance, skin/hair changes  HEME: NEGATIVE for bleeding problems  PSYCHIATRIC:  NEGATIVE for changes in mood or affect    Patient Active Problem List    Diagnosis Date Noted     Bilateral thumb pain 11/08/2022     Priority: Medium     Esophageal dysphagia 11/03/2022     Priority: Medium     Diverticulitis of colon 11/03/2022     Priority: Medium     Heartburn 11/03/2022     Priority: Medium     Dysphonia 11/03/2022     Priority: Medium     Diabetes mellitus, type 2 (H) 07/13/2021     Priority: Medium     Elevated coronary artery calcium score 07/13/2021     Priority: Medium     Elevated fasting glucose 05/25/2021     Priority: Medium     Obesity (BMI 35.0-39.9) with comorbidity (H) 10/09/2018     Priority: Medium     Cellulitis and abscess of leg-healing and closing  10/25/2016     Priority: Medium     Family history of diabetes mellitus 10/21/2016     Priority: Medium     Glucose intolerance (impaired glucose tolerance) 10/21/2016     Priority: Medium     Non morbid obesity due to excess calories 10/21/2016     Priority: Medium     Screening for prostate cancer 10/21/2016     Priority: Medium     Mixed hyperlipidemia 10/21/2016     Priority: Medium     Tobacco use disorder 19-48y/o @ 1.5ppd for 15 yrs then 1ppd=31-32 pk yr hx 10/21/2016     Priority: Medium     Benign essential hypertension 03/31/2016     Priority: Medium     JOJO (obstructive sleep apnea) 03/31/2016     Priority: Medium     Chronic coronary artery disease 12/17/2014     Priority: Medium     Overview:   By 12/2014 heart scan.       Impotence of organic origin 08/06/2013     Priority: Medium     History of vasectomy 05/24/2013     Priority: Medium     Vitamin D deficiency 04/11/2011     Priority: Medium     Family history of coronary artery disease 03/01/2011     Priority: Medium     Gastroesophageal reflux disease 07/22/2009     Priority: Medium     Hypercholesterolemia 07/22/2009     Priority: Medium      Past Medical History:   Diagnosis Date     HTN (hypertension)      Hypercholesteremia      JOJO (obstructive sleep apnea)       Pre-diabetes      Past Surgical History:   Procedure Laterality Date     NO HISTORY OF SURGERY       Current Outpatient Medications   Medication Sig Dispense Refill     albuterol (PROAIR HFA/PROVENTIL HFA/VENTOLIN HFA) 108 (90 Base) MCG/ACT inhaler INHALE 2 PUFFS INTO THE LUNGS EVERY 6 HOURS AS NEEDED FOR SHORTNESS OF BREATH OR DIFFICULT BREATHING OR WHEEZING 6.7 g 4     aspirin (ASA) 81 MG chewable tablet Take 1 tablet (81 mg) by mouth daily 100 tablet 0     atorvastatin (LIPITOR) 40 MG tablet Take 1 tablet (40 mg) by mouth daily 90 tablet 1     bisacodyl (DULCOLAX) 5 MG EC tablet Take 2 tablets at 3 pm the day before your procedure. If your procedure is before 11 am, take 2 additional tablets at 11 pm. If your procedure is after 11 am, take 2 additional tablets at 6 am. For additional instructions refer to your colonoscopy prep instructions. 4 tablet 0     blood glucose (NO BRAND SPECIFIED) lancets standard Use to test blood sugar 2 times daily or as directed. 100 each 1     blood glucose (NO BRAND SPECIFIED) test strip Use to test blood sugar 2 times daily or as directed. 100 strip 1     blood glucose monitoring (NO BRAND SPECIFIED) meter device kit Use to test blood sugar 2 times daily or as directed. 1 kit 0     ciprofloxacin (CIPRO) 500 MG tablet Take 1 tablet (500 mg) by mouth 2 times daily 20 tablet 0     fluticasone (FLONASE) 50 MCG/ACT nasal spray SHAKE LIQUID AND USE 1 SPRAY IN EACH NOSTRIL DAILY 48 g 2     Fluticasone-Salmeterol (ADVAIR HFA IN)        fluticasone-salmeterol (ADVAIR HFA) 115-21 MCG/ACT inhaler Inhale 2 puffs into the lungs 2 times daily 12 g 3     ipratropium - albuterol 0.5 mg/2.5 mg/3 mL (DUONEB) 0.5-2.5 (3) MG/3ML neb solution USE 1 VIAL VIA NEBULIZER EVERY 4 HOURS AS NEEDED FOR SHORTNESS OF BREATH, DYSPNEA,OR WHEEZING 360 mL 2     losartan (COZAAR) 100 MG tablet Take 1 tablet (100 mg) by mouth daily 90 tablet 1     metFORMIN (GLUCOPHAGE XR) 500 MG 24 hr tablet TAKE 1 TABLET(500  "MG) BY MOUTH TWICE a   DAY WITH MEALS 180 tablet 1     metroNIDAZOLE (FLAGYL) 500 MG tablet Take 1 tablet (500 mg) by mouth 3 times daily 30 tablet 0     Multiple Vitamin (MULTIVITAMIN ADULT PO) Take 1 tablet by mouth daily       polyethylene glycol (GOLYTELY) 236 g suspension The night before the exam at 6 pm drink an 8-ounce glass every 15 minutes until the jug is half empty. If you arrive before 11 AM: Drink the other half of the Golytely jug at 11 PM night before procedure. If you arrive after 11 AM: Drink the other half of the Golytely jug at 6 AM day of procedure. For additional instructions refer to your colonoscopy prep instructions. 4000 mL 0     varenicline (CHANTIX CLEMENTINA) 0.5 MG X 11 & 1 MG X 42 tablet Take 0.5 mg tab daily for 3 days, THEN 0.5 mg tab twice daily for 4 days, THEN 1 mg twice daily. 53 tablet 0     varenicline (CHANTIX CLEMENTINA) 0.5 MG X 11 & 1 MG X 42 tablet Take 0.5 mg tab daily for 3 days, THEN 0.5 mg tab twice daily for 4 days, THEN 1 mg twice daily. 53 tablet 2     varenicline (CHANTIX) 1 MG tablet Take 1 tablet (1 mg) by mouth 2 times daily 180 tablet 1       Allergies   Allergen Reactions     Ibuprofen      Other reaction(s): Other - Describe In Comment Field  Uri type of symptoms; \"watery eyes\". Gel caps are  OK  Eyes water,runny nose     Lisinopril Cough        Social History     Tobacco Use     Smoking status: Former     Packs/day: 1.00     Years: 20.00     Pack years: 20.00     Types: Cigarettes     Quit date: 10/4/2016     Years since quittin.2     Smokeless tobacco: Never     Tobacco comments:     last 10 years were off and on, about 2016   Substance Use Topics     Alcohol use: Yes     Alcohol/week: 0.0 standard drinks     Comment: 1-2 times per week, 2 drinks       History   Drug Use No         Objective     /81   Pulse 52   Temp 97.9  F (36.6  C) (Temporal)   Wt 120.7 kg (266 lb)   SpO2 93%   BMI 38.17 kg/m      Physical Exam    GENERAL APPEARANCE: healthy, alert " and no distress     EYES: EOMI,  PERRL     HENT:  nose and mouth without ulcers or lesions     NECK: no adenopathy, no asymmetry, masses, or scars and thyroid normal to palpation     RESP: lungs clear to auscultation - no rales, rhonchi or wheezes     CV: regular rates and rhythm, normal S1 S2, no S3 or S4 and no murmur, click or rub     ABDOMEN:  soft, nontender, no HSM or masses and bowel sounds normal     MS: extremities normal- no gross deformities noted, no evidence of inflammation in joints, FROM in all extremities.     SKIN: no suspicious lesions or rashes     NEURO: Normal strength and tone, sensory exam grossly normal, mentation intact and speech normal     PSYCH: mentation appears normal. and affect normal/bright     LYMPHATICS: No cervical adenopathy    Recent Labs   Lab Test 12/06/22  1152 04/13/22  0935    138   POTASSIUM 4.5 4.3   CR 0.95 0.88   A1C 6.2* 5.6        Diagnostics:  Recent Results (from the past 720 hour(s))   HEMOGLOBIN A1C    Collection Time: 12/06/22 11:52 AM   Result Value Ref Range    Hemoglobin A1C 6.2 (H) 0.0 - 5.6 %   Albumin Random Urine Quantitative with Creat Ratio    Collection Time: 12/06/22 11:52 AM   Result Value Ref Range    Albumin Urine mg/L <12.0 mg/L    Albumin Urine mg/g Cr      Creatinine Urine mg/dL 54.6 mg/dL   Comprehensive metabolic panel (BMP + Alb, Alk Phos, ALT, AST, Total. Bili, TP)    Collection Time: 12/06/22 11:52 AM   Result Value Ref Range    Sodium 139 136 - 145 mmol/L    Potassium 4.5 3.4 - 5.3 mmol/L    Chloride 105 98 - 107 mmol/L    Carbon Dioxide (CO2) 22 22 - 29 mmol/L    Anion Gap 12 7 - 15 mmol/L    Urea Nitrogen 18.2 6.0 - 20.0 mg/dL    Creatinine 0.95 0.67 - 1.17 mg/dL    Calcium 9.2 8.6 - 10.0 mg/dL    Glucose 103 (H) 70 - 99 mg/dL    Alkaline Phosphatase 79 40 - 129 U/L    AST 28 10 - 50 U/L    ALT 38 10 - 50 U/L    Protein Total 7.4 6.4 - 8.3 g/dL    Albumin 4.5 3.5 - 5.2 g/dL    Bilirubin Total 0.4 <=1.2 mg/dL    GFR Estimate >90 >60  mL/min/1.73m2   Lipid panel reflex to direct LDL Fasting    Collection Time: 12/06/22 11:52 AM   Result Value Ref Range    Cholesterol 138 <200 mg/dL    Triglycerides 103 <150 mg/dL    Direct Measure HDL 55 >=40 mg/dL    LDL Cholesterol Calculated 62 <=100 mg/dL    Non HDL Cholesterol 83 <130 mg/dL      No EKG required, no history of coronary heart disease, significant arrhythmia, peripheral arterial disease or other structural heart disease.    Revised Cardiac Risk Index (RCRI):  The patient has the following serious cardiovascular risks for perioperative complications:   - Coronary Artery Disease (MI, positive stress test, angina, Qs on EKG) = 1 point.  He had an elevated coronary calcium score of 113 in 2021.    RCRI Interpretation: 1 point: Class II (low risk - 0.9% complication rate)           Signed Electronically by: Joel Marrero DO  Copy of this evaluation report is provided to requesting physician.

## 2022-12-26 NOTE — PROGRESS NOTES
[facility]  [address], [address]  [facility] [address]  Phone: [phone]  Primary Provider: Johana Tamez  Pre-op Performing Provider: WARD FABIAN      PREOPERATIVE EVALUATION:  Today's date: 12/26/2022    Martin Anguiano is a 57 year old male who presents for a preoperative evaluation.    Surgical Information:  Surgery/Procedure: COLONOSCOPY and EGD  Surgery Location:  GI  Surgeon: Titi Navarro MD  Surgery Date: 12/29/2022  Time of Surgery: 7:30 AM   Where patient plans to recover: At home with family  Fax number for surgical facility: Note does not need to be faxed, will be available electronically in Epic.    Type of Anesthesia Anticipated:  MAC    Assessment & Plan     The proposed surgical procedure is considered INTERMEDIATE risk.    Preop general physical exam  He is cleared to proceed with the upper and lower endoscopy procedures under MAC anesthesia as scheduled later this week.  He is going to hold his morning medications (losartan and metformin) prior to this procedure.    Dysphagia, unspecified type  He is cleared to proceed with the upper endoscopy procedure.    History of colonic diverticulitis  This issue has improved since being treated for this earlier this year.    History of colonic polyps  He had a hyperplastic polyp removed on his colonoscopy in 2018.    Type 2 diabetes mellitus without complication, without long-term current use of insulin (H)  Continue metformin.  However, he is going to hold this on the morning of surgery.    Benign essential hypertension  Continue losartan.  However, he is going to hold this on the morning of surgery.    Morbid obesity (H)  He will continue to work on lifestyle modifications to help with weight loss.    Hypercholesterolemia  Continue atorvastatin.    Mild persistent asthma without complication  Stable on Advair and albuterol as needed.    JOJO (obstructive sleep apnea)  This issue has been stable on  CPAP.    Cigarette nicotine dependence without complication  I congratulated him on quitting smoking recently.  He was given a prescription for the maintenance Chantix dose.- varenicline (CHANTIX) 1 MG tablet; Take 1 tablet (1 mg) by mouth 2 times daily               RECOMMENDATION:  APPROVAL GIVEN to proceed with proposed procedure, without further diagnostic evaluation.      40 minutes spent on the date of the encounter doing chart review, review of outside records, review of test results, patient visit and documentation       Subjective     HPI related to upcoming procedure: He had an episode this fall in which she was choking on food.  He has had some mild dysphagia symptoms since that time.  He is scheduled to undergo an upper endoscopy procedure later this week.  He also has a history of a hyperplastic polyp that was removed during his colonoscopy in 2019.  This past year he had a bout of diverticulitis as well.  He is scheduled to undergo a colonoscopy at the time of his EGD.  His medical history significant for type 2 diabetes, hypertension, hyperlipidemia, obesity, mild persistent asthma, obstructive sleep apnea and nicotine dependence.  He quit smoking about 10 days ago and is requesting the maintenance Chantix dose since he will be running out of the starter pack soon.  On 12/6/2022 his hemoglobin A1c was 6.2% and the CMP and cholesterol results were normal.    Preop Questions 12/26/2022   1. Have you ever had a heart attack or stroke? No   2. Have you ever had surgery on your heart or blood vessels, such as a stent placement, a coronary artery bypass, or surgery on an artery in your head, neck, heart, or legs? No   3. Do you have chest pain with activity? No   4. Do you have a history of  heart failure? No   5. Do you currently have a cold, bronchitis or symptoms of other infection? No   6. Do you have a cough, shortness of breath, or wheezing? No   7. Do you or anyone in your family have previous  history of blood clots? UNKNOWN    8. Do you or does anyone in your family have a serious bleeding problem such as prolonged bleeding following surgeries or cuts? No   9. Have you ever had problems with anemia or been told to take iron pills? No   10. Have you had any abnormal blood loss such as black, tarry or bloody stools? No   11. Have you ever had a blood transfusion? No   12. Are you willing to have a blood transfusion if it is medically needed before, during, or after your surgery? Yes   13. Have you or any of your relatives ever had problems with anesthesia? No   14. Do you have sleep apnea, excessive snoring or daytime drowsiness? YES - On CPAP   14a. Do you have a CPAP machine? Yes   15. Do you have any artifical heart valves or other implanted medical devices like a pacemaker, defibrillator, or continuous glucose monitor? No   16. Do you have artificial joints? No   17. Are you allergic to latex? No       Health Care Directive:  Patient does not have a Health Care Directive or Living Will: Discussed advance care planning with patient; information given to patient to review.    Preoperative Review of :   reviewed - controlled substances reflected in medication list.          Review of Systems  CONSTITUTIONAL: NEGATIVE for fever, chills, change in weight  INTEGUMENTARY/SKIN: NEGATIVE for worrisome rashes, moles or lesions  EYES: NEGATIVE for vision changes or irritation  ENT/MOUTH: NEGATIVE for ear, mouth and throat problems  RESP: NEGATIVE for significant cough or SOB  CV: NEGATIVE for chest pain, palpitations or peripheral edema  GI: NEGATIVE for nausea, abdominal pain, heartburn, or change in bowel habits  : NEGATIVE for frequency, dysuria, or hematuria  MUSCULOSKELETAL: NEGATIVE for significant arthralgias or myalgia  NEURO: NEGATIVE for weakness, dizziness or paresthesias  ENDOCRINE: NEGATIVE for temperature intolerance, skin/hair changes  HEME: NEGATIVE for bleeding problems  PSYCHIATRIC:  NEGATIVE for changes in mood or affect    Patient Active Problem List    Diagnosis Date Noted     Bilateral thumb pain 11/08/2022     Priority: Medium     Esophageal dysphagia 11/03/2022     Priority: Medium     Diverticulitis of colon 11/03/2022     Priority: Medium     Heartburn 11/03/2022     Priority: Medium     Dysphonia 11/03/2022     Priority: Medium     Diabetes mellitus, type 2 (H) 07/13/2021     Priority: Medium     Elevated coronary artery calcium score 07/13/2021     Priority: Medium     Elevated fasting glucose 05/25/2021     Priority: Medium     Obesity (BMI 35.0-39.9) with comorbidity (H) 10/09/2018     Priority: Medium     Cellulitis and abscess of leg-healing and closing  10/25/2016     Priority: Medium     Family history of diabetes mellitus 10/21/2016     Priority: Medium     Glucose intolerance (impaired glucose tolerance) 10/21/2016     Priority: Medium     Non morbid obesity due to excess calories 10/21/2016     Priority: Medium     Screening for prostate cancer 10/21/2016     Priority: Medium     Mixed hyperlipidemia 10/21/2016     Priority: Medium     Tobacco use disorder 19-48y/o @ 1.5ppd for 15 yrs then 1ppd=31-32 pk yr hx 10/21/2016     Priority: Medium     Benign essential hypertension 03/31/2016     Priority: Medium     JOJO (obstructive sleep apnea) 03/31/2016     Priority: Medium     Chronic coronary artery disease 12/17/2014     Priority: Medium     Overview:   By 12/2014 heart scan.       Impotence of organic origin 08/06/2013     Priority: Medium     History of vasectomy 05/24/2013     Priority: Medium     Vitamin D deficiency 04/11/2011     Priority: Medium     Family history of coronary artery disease 03/01/2011     Priority: Medium     Gastroesophageal reflux disease 07/22/2009     Priority: Medium     Hypercholesterolemia 07/22/2009     Priority: Medium      Past Medical History:   Diagnosis Date     HTN (hypertension)      Hypercholesteremia      JOJO (obstructive sleep apnea)       Pre-diabetes      Past Surgical History:   Procedure Laterality Date     NO HISTORY OF SURGERY       Current Outpatient Medications   Medication Sig Dispense Refill     albuterol (PROAIR HFA/PROVENTIL HFA/VENTOLIN HFA) 108 (90 Base) MCG/ACT inhaler INHALE 2 PUFFS INTO THE LUNGS EVERY 6 HOURS AS NEEDED FOR SHORTNESS OF BREATH OR DIFFICULT BREATHING OR WHEEZING 6.7 g 4     aspirin (ASA) 81 MG chewable tablet Take 1 tablet (81 mg) by mouth daily 100 tablet 0     atorvastatin (LIPITOR) 40 MG tablet Take 1 tablet (40 mg) by mouth daily 90 tablet 1     bisacodyl (DULCOLAX) 5 MG EC tablet Take 2 tablets at 3 pm the day before your procedure. If your procedure is before 11 am, take 2 additional tablets at 11 pm. If your procedure is after 11 am, take 2 additional tablets at 6 am. For additional instructions refer to your colonoscopy prep instructions. 4 tablet 0     blood glucose (NO BRAND SPECIFIED) lancets standard Use to test blood sugar 2 times daily or as directed. 100 each 1     blood glucose (NO BRAND SPECIFIED) test strip Use to test blood sugar 2 times daily or as directed. 100 strip 1     blood glucose monitoring (NO BRAND SPECIFIED) meter device kit Use to test blood sugar 2 times daily or as directed. 1 kit 0     ciprofloxacin (CIPRO) 500 MG tablet Take 1 tablet (500 mg) by mouth 2 times daily 20 tablet 0     fluticasone (FLONASE) 50 MCG/ACT nasal spray SHAKE LIQUID AND USE 1 SPRAY IN EACH NOSTRIL DAILY 48 g 2     Fluticasone-Salmeterol (ADVAIR HFA IN)        fluticasone-salmeterol (ADVAIR HFA) 115-21 MCG/ACT inhaler Inhale 2 puffs into the lungs 2 times daily 12 g 3     ipratropium - albuterol 0.5 mg/2.5 mg/3 mL (DUONEB) 0.5-2.5 (3) MG/3ML neb solution USE 1 VIAL VIA NEBULIZER EVERY 4 HOURS AS NEEDED FOR SHORTNESS OF BREATH, DYSPNEA,OR WHEEZING 360 mL 2     losartan (COZAAR) 100 MG tablet Take 1 tablet (100 mg) by mouth daily 90 tablet 1     metFORMIN (GLUCOPHAGE XR) 500 MG 24 hr tablet TAKE 1 TABLET(500  "MG) BY MOUTH TWICE a   DAY WITH MEALS 180 tablet 1     metroNIDAZOLE (FLAGYL) 500 MG tablet Take 1 tablet (500 mg) by mouth 3 times daily 30 tablet 0     Multiple Vitamin (MULTIVITAMIN ADULT PO) Take 1 tablet by mouth daily       polyethylene glycol (GOLYTELY) 236 g suspension The night before the exam at 6 pm drink an 8-ounce glass every 15 minutes until the jug is half empty. If you arrive before 11 AM: Drink the other half of the Golytely jug at 11 PM night before procedure. If you arrive after 11 AM: Drink the other half of the Golytely jug at 6 AM day of procedure. For additional instructions refer to your colonoscopy prep instructions. 4000 mL 0     varenicline (CHANTIX CLEMENTINA) 0.5 MG X 11 & 1 MG X 42 tablet Take 0.5 mg tab daily for 3 days, THEN 0.5 mg tab twice daily for 4 days, THEN 1 mg twice daily. 53 tablet 0     varenicline (CHANTIX CLEMENTINA) 0.5 MG X 11 & 1 MG X 42 tablet Take 0.5 mg tab daily for 3 days, THEN 0.5 mg tab twice daily for 4 days, THEN 1 mg twice daily. 53 tablet 2     varenicline (CHANTIX) 1 MG tablet Take 1 tablet (1 mg) by mouth 2 times daily 180 tablet 1       Allergies   Allergen Reactions     Ibuprofen      Other reaction(s): Other - Describe In Comment Field  Uri type of symptoms; \"watery eyes\". Gel caps are  OK  Eyes water,runny nose     Lisinopril Cough        Social History     Tobacco Use     Smoking status: Former     Packs/day: 1.00     Years: 20.00     Pack years: 20.00     Types: Cigarettes     Quit date: 10/4/2016     Years since quittin.2     Smokeless tobacco: Never     Tobacco comments:     last 10 years were off and on, about 2016   Substance Use Topics     Alcohol use: Yes     Alcohol/week: 0.0 standard drinks     Comment: 1-2 times per week, 2 drinks       History   Drug Use No         Objective     /81   Pulse 52   Temp 97.9  F (36.6  C) (Temporal)   Wt 120.7 kg (266 lb)   SpO2 93%   BMI 38.17 kg/m      Physical Exam    GENERAL APPEARANCE: healthy, alert " and no distress     EYES: EOMI,  PERRL     HENT:  nose and mouth without ulcers or lesions     NECK: no adenopathy, no asymmetry, masses, or scars and thyroid normal to palpation     RESP: lungs clear to auscultation - no rales, rhonchi or wheezes     CV: regular rates and rhythm, normal S1 S2, no S3 or S4 and no murmur, click or rub     ABDOMEN:  soft, nontender, no HSM or masses and bowel sounds normal     MS: extremities normal- no gross deformities noted, no evidence of inflammation in joints, FROM in all extremities.     SKIN: no suspicious lesions or rashes     NEURO: Normal strength and tone, sensory exam grossly normal, mentation intact and speech normal     PSYCH: mentation appears normal. and affect normal/bright     LYMPHATICS: No cervical adenopathy    Recent Labs   Lab Test 12/06/22  1152 04/13/22  0935    138   POTASSIUM 4.5 4.3   CR 0.95 0.88   A1C 6.2* 5.6        Diagnostics:  Recent Results (from the past 720 hour(s))   HEMOGLOBIN A1C    Collection Time: 12/06/22 11:52 AM   Result Value Ref Range    Hemoglobin A1C 6.2 (H) 0.0 - 5.6 %   Albumin Random Urine Quantitative with Creat Ratio    Collection Time: 12/06/22 11:52 AM   Result Value Ref Range    Albumin Urine mg/L <12.0 mg/L    Albumin Urine mg/g Cr      Creatinine Urine mg/dL 54.6 mg/dL   Comprehensive metabolic panel (BMP + Alb, Alk Phos, ALT, AST, Total. Bili, TP)    Collection Time: 12/06/22 11:52 AM   Result Value Ref Range    Sodium 139 136 - 145 mmol/L    Potassium 4.5 3.4 - 5.3 mmol/L    Chloride 105 98 - 107 mmol/L    Carbon Dioxide (CO2) 22 22 - 29 mmol/L    Anion Gap 12 7 - 15 mmol/L    Urea Nitrogen 18.2 6.0 - 20.0 mg/dL    Creatinine 0.95 0.67 - 1.17 mg/dL    Calcium 9.2 8.6 - 10.0 mg/dL    Glucose 103 (H) 70 - 99 mg/dL    Alkaline Phosphatase 79 40 - 129 U/L    AST 28 10 - 50 U/L    ALT 38 10 - 50 U/L    Protein Total 7.4 6.4 - 8.3 g/dL    Albumin 4.5 3.5 - 5.2 g/dL    Bilirubin Total 0.4 <=1.2 mg/dL    GFR Estimate >90 >60  mL/min/1.73m2   Lipid panel reflex to direct LDL Fasting    Collection Time: 12/06/22 11:52 AM   Result Value Ref Range    Cholesterol 138 <200 mg/dL    Triglycerides 103 <150 mg/dL    Direct Measure HDL 55 >=40 mg/dL    LDL Cholesterol Calculated 62 <=100 mg/dL    Non HDL Cholesterol 83 <130 mg/dL      No EKG required, no history of coronary heart disease, significant arrhythmia, peripheral arterial disease or other structural heart disease.    Revised Cardiac Risk Index (RCRI):  The patient has the following serious cardiovascular risks for perioperative complications:   - Coronary Artery Disease (MI, positive stress test, angina, Qs on EKG) = 1 point.  He had an elevated coronary calcium score of 113 in 2021.    RCRI Interpretation: 1 point: Class II (low risk - 0.9% complication rate)           Signed Electronically by: Joel Marrero DO  Copy of this evaluation report is provided to requesting physician.

## 2022-12-26 NOTE — PATIENT INSTRUCTIONS
For informational purposes only. Not to replace the advice of your health care provider. Copyright   2003,  [facility]. All rights reserved. Clinically reviewed by Elizabeth Montana MD. [employer] [id number] - REV .  Preparing for Your Surgery  Getting started  A nurse will call you to review your health history and instructions. They will give you an arrival time based on your scheduled surgery time. Please be ready to share:    Your doctor's clinic name and phone number    Your medical, surgical, and anesthesia history    A list of allergies and sensitivities    A list of medicines, including herbal treatments and over-the-counter drugs    Whether the patient has a legal guardian (ask how to send us the papers in advance)  Please tell us if you're pregnant--or if there's any chance you might be pregnant. Some surgeries may injure a fetus (unborn baby), so they require a pregnancy test. Surgeries that are safe for a fetus don't always need a test, and you can choose whether to have one.   If you have a child who's having surgery, please ask for a copy of Preparing for Your Child's Surgery.    Preparing for surgery    Within 10 to 30 days of surgery: Have a pre-op exam (sometimes called an H&P, or History and Physical). This can be done at a clinic or pre-operative center.  ? If you're having a , you may not need this exam. Talk to your care team.    At your pre-op exam, talk to your care team about all medicines you take. If you need to stop any medicines before surgery, ask when to start taking them again.  ? We do this for your safety. Many medicines can make you bleed too much during surgery. Some change how well surgery (anesthesia) drugs work.    Call your insurance company to let them know you're having surgery. (If you don't have insurance, call [phone].)    Call your clinic if there's any change in your health. This includes signs of a cold or flu (sore throat, runny nose,  cough, rash, fever). It also includes a scrape or scratch near the surgery site.    If you have questions on the day of surgery, call your hospital or surgery center.  Eating and drinking guidelines  For your safety: Unless your surgeon tells you otherwise, follow the guidelines below.    Eat and drink as usual until 8 hours before you arrive for surgery. After that, no food or milk.    Drink clear liquids until 2 hours before you arrive. These are liquids you can see through, like water, Gatorade, and Propel Water. They also include plain black coffee and tea (no cream or milk), candy, and breath mints. You can spit out gum when you arrive.    If you drink alcohol: Stop drinking it the night before surgery.    If your care team tells you to take medicine on the morning of surgery, it's okay to take it with a sip of water.  Preventing infection    Shower or bathe the night before and morning of your surgery. Follow the instructions your clinic gave you. (If no instructions, use regular soap.)    Don't shave or clip hair near your surgery site. We'll remove the hair if needed.    Don't smoke or vape the morning of surgery. You may chew nicotine gum up to 2 hours before surgery. A nicotine patch is okay.  ? Note: Some surgeries require you to completely quit smoking and nicotine. Check with your surgeon.    Your care team will make every effort to keep you safe from infection. We will:  ? Clean our hands often with soap and water (or an alcohol-based hand rub).  ? Clean the skin at your surgery site with a special soap that kills germs.  ? Give you a special gown to keep you warm. (Cold raises the risk of infection.)  ? Wear special hair covers, masks, gowns and gloves during surgery.  ? Give antibiotic medicine, if prescribed. Not all surgeries need antibiotics.  What to bring on the day of surgery    Photo ID and insurance card    Copy of your health care directive, if you have one    Glasses and hearing aids (bring  cases)  ? You can't wear contacts during surgery    Inhaler and eye drops, if you use them (tell us about these when you arrive)    CPAP machine or breathing device, if you use them    A few personal items, if spending the night    If you have . . .  ? A pacemaker, ICD (cardiac defibrillator) or other implant: Bring the ID card.  ? An implanted stimulator: Bring the remote control.  ? A legal guardian: Bring a copy of the certified (court-stamped) guardianship papers.  Please remove any jewelry, including body piercings. Leave jewelry and other valuables at home.  If you're going home the day of surgery    You must have a responsible adult drive you home. They should stay with you overnight as well.    If you don't have someone to stay with you, and you aren't safe to go home alone, we may keep you overnight. Insurance often won't pay for this.  After surgery  If it's hard to control your pain or you need more pain medicine, please call your surgeon's office.  Questions?   If you have any questions for your care team, list them here: _________________________________________________________________________________________________________________________________________________________________________ ____________________________________ ____________________________________ ____________________________________

## 2022-12-28 ENCOUNTER — ANESTHESIA EVENT (OUTPATIENT)
Dept: GASTROENTEROLOGY | Facility: CLINIC | Age: 57
End: 2022-12-28
Payer: COMMERCIAL

## 2022-12-29 ENCOUNTER — ANESTHESIA (OUTPATIENT)
Dept: GASTROENTEROLOGY | Facility: CLINIC | Age: 57
End: 2022-12-29
Payer: COMMERCIAL

## 2022-12-29 ENCOUNTER — HOSPITAL ENCOUNTER (OUTPATIENT)
Facility: CLINIC | Age: 57
Discharge: HOME | End: 2022-12-29
Attending: SPECIALIST | Admitting: SPECIALIST
Payer: COMMERCIAL

## 2022-12-29 VITALS
OXYGEN SATURATION: 96 % | SYSTOLIC BLOOD PRESSURE: 106 MMHG | WEIGHT: 266 LBS | HEART RATE: 52 BPM | BODY MASS INDEX: 38.17 KG/M2 | RESPIRATION RATE: 16 BRPM | DIASTOLIC BLOOD PRESSURE: 70 MMHG

## 2022-12-29 DIAGNOSIS — K22.2 SCHATZKI'S RING OF DISTAL ESOPHAGUS: ICD-10-CM

## 2022-12-29 DIAGNOSIS — Z12.11 ENCOUNTER FOR SCREENING COLONOSCOPY: Primary | ICD-10-CM

## 2022-12-29 LAB
COLONOSCOPY: NORMAL
UPPER GI ENDOSCOPY: NORMAL

## 2022-12-29 PROCEDURE — 370N000017 HC ANESTHESIA TECHNICAL FEE, PER MIN: Performed by: SPECIALIST

## 2022-12-29 PROCEDURE — 43239 EGD BIOPSY SINGLE/MULTIPLE: CPT | Mod: XS | Performed by: SPECIALIST

## 2022-12-29 PROCEDURE — 43251 EGD REMOVE LESION SNARE: CPT | Performed by: SPECIALIST

## 2022-12-29 PROCEDURE — 88305 TISSUE EXAM BY PATHOLOGIST: CPT | Mod: TC | Performed by: SPECIALIST

## 2022-12-29 PROCEDURE — 250N000011 HC RX IP 250 OP 636

## 2022-12-29 PROCEDURE — 88305 TISSUE EXAM BY PATHOLOGIST: CPT | Mod: 26 | Performed by: PATHOLOGY

## 2022-12-29 PROCEDURE — 45385 COLONOSCOPY W/LESION REMOVAL: CPT | Performed by: SPECIALIST

## 2022-12-29 PROCEDURE — 43249 ESOPH EGD DILATION <30 MM: CPT | Performed by: SPECIALIST

## 2022-12-29 PROCEDURE — 88342 IMHCHEM/IMCYTCHM 1ST ANTB: CPT | Mod: 26 | Performed by: PATHOLOGY

## 2022-12-29 PROCEDURE — 258N000003 HC RX IP 258 OP 636

## 2022-12-29 PROCEDURE — 999N000010 HC STATISTIC ANES STAT CODE-CRNA PER MINUTE: Performed by: SPECIALIST

## 2022-12-29 PROCEDURE — 250N000009 HC RX 250

## 2022-12-29 RX ORDER — NALOXONE HYDROCHLORIDE 0.4 MG/ML
0.4 INJECTION, SOLUTION INTRAMUSCULAR; INTRAVENOUS; SUBCUTANEOUS
Status: CANCELLED | OUTPATIENT
Start: 2022-12-29

## 2022-12-29 RX ORDER — SODIUM CHLORIDE, SODIUM LACTATE, POTASSIUM CHLORIDE, CALCIUM CHLORIDE 600; 310; 30; 20 MG/100ML; MG/100ML; MG/100ML; MG/100ML
INJECTION, SOLUTION INTRAVENOUS CONTINUOUS PRN
Status: DISCONTINUED | OUTPATIENT
Start: 2022-12-29 | End: 2022-12-29

## 2022-12-29 RX ORDER — ONDANSETRON 2 MG/ML
4 INJECTION INTRAMUSCULAR; INTRAVENOUS EVERY 6 HOURS PRN
Status: CANCELLED | OUTPATIENT
Start: 2022-12-29

## 2022-12-29 RX ORDER — NALOXONE HYDROCHLORIDE 0.4 MG/ML
0.2 INJECTION, SOLUTION INTRAMUSCULAR; INTRAVENOUS; SUBCUTANEOUS
Status: CANCELLED | OUTPATIENT
Start: 2022-12-29

## 2022-12-29 RX ORDER — FLUMAZENIL 0.1 MG/ML
0.2 INJECTION, SOLUTION INTRAVENOUS
Status: CANCELLED | OUTPATIENT
Start: 2022-12-29 | End: 2022-12-29

## 2022-12-29 RX ORDER — ONDANSETRON 4 MG/1
4 TABLET, ORALLY DISINTEGRATING ORAL EVERY 6 HOURS PRN
Status: CANCELLED | OUTPATIENT
Start: 2022-12-29

## 2022-12-29 RX ORDER — LIDOCAINE 40 MG/G
CREAM TOPICAL
Status: DISCONTINUED | OUTPATIENT
Start: 2022-12-29 | End: 2022-12-29 | Stop reason: HOSPADM

## 2022-12-29 RX ORDER — DEXMEDETOMIDINE HYDROCHLORIDE 4 UG/ML
INJECTION, SOLUTION INTRAVENOUS PRN
Status: DISCONTINUED | OUTPATIENT
Start: 2022-12-29 | End: 2022-12-29

## 2022-12-29 RX ORDER — PROPOFOL 10 MG/ML
INJECTION, EMULSION INTRAVENOUS CONTINUOUS PRN
Status: DISCONTINUED | OUTPATIENT
Start: 2022-12-29 | End: 2022-12-29

## 2022-12-29 RX ORDER — ONDANSETRON 2 MG/ML
INJECTION INTRAMUSCULAR; INTRAVENOUS PRN
Status: DISCONTINUED | OUTPATIENT
Start: 2022-12-29 | End: 2022-12-29

## 2022-12-29 RX ORDER — ONDANSETRON 2 MG/ML
4 INJECTION INTRAMUSCULAR; INTRAVENOUS
Status: DISCONTINUED | OUTPATIENT
Start: 2022-12-29 | End: 2022-12-29 | Stop reason: HOSPADM

## 2022-12-29 RX ORDER — LIDOCAINE HYDROCHLORIDE 20 MG/ML
INJECTION, SOLUTION INFILTRATION; PERINEURAL PRN
Status: DISCONTINUED | OUTPATIENT
Start: 2022-12-29 | End: 2022-12-29

## 2022-12-29 RX ORDER — PROCHLORPERAZINE MALEATE 10 MG
10 TABLET ORAL EVERY 6 HOURS PRN
Status: CANCELLED | OUTPATIENT
Start: 2022-12-29

## 2022-12-29 RX ADMIN — DEXMEDETOMIDINE HYDROCHLORIDE 8 MCG: 200 INJECTION INTRAVENOUS at 07:50

## 2022-12-29 RX ADMIN — PROPOFOL 50 MG: 10 INJECTION, EMULSION INTRAVENOUS at 07:48

## 2022-12-29 RX ADMIN — SODIUM CHLORIDE, POTASSIUM CHLORIDE, SODIUM LACTATE AND CALCIUM CHLORIDE: 600; 310; 30; 20 INJECTION, SOLUTION INTRAVENOUS at 07:40

## 2022-12-29 RX ADMIN — ONDANSETRON 4 MG: 2 INJECTION INTRAMUSCULAR; INTRAVENOUS at 07:48

## 2022-12-29 RX ADMIN — PROPOFOL 50 MG: 10 INJECTION, EMULSION INTRAVENOUS at 08:24

## 2022-12-29 RX ADMIN — LIDOCAINE HYDROCHLORIDE 20 MG: 20 INJECTION, SOLUTION INFILTRATION; PERINEURAL at 08:18

## 2022-12-29 RX ADMIN — DEXMEDETOMIDINE HYDROCHLORIDE 4 MCG: 200 INJECTION INTRAVENOUS at 08:00

## 2022-12-29 RX ADMIN — PROPOFOL 50 MG: 10 INJECTION, EMULSION INTRAVENOUS at 08:33

## 2022-12-29 RX ADMIN — PROPOFOL 50 MG: 10 INJECTION, EMULSION INTRAVENOUS at 07:52

## 2022-12-29 RX ADMIN — PROPOFOL 125 MCG/KG/MIN: 10 INJECTION, EMULSION INTRAVENOUS at 07:47

## 2022-12-29 ASSESSMENT — LIFESTYLE VARIABLES: TOBACCO_USE: 1

## 2022-12-29 ASSESSMENT — ACTIVITIES OF DAILY LIVING (ADL)
ADLS_ACUITY_SCORE: 33
ADLS_ACUITY_SCORE: 35

## 2022-12-29 NOTE — ANESTHESIA PREPROCEDURE EVALUATION
Prior to Admission medications    Medication Sig Start Date End Date Taking? Authorizing Provider   bisacodyl (DULCOLAX) 5 MG EC tablet Take 2 tablets at 3 pm the day before your procedure. If your procedure is before 11 am, take 2 additional tablets at 11 pm. If your procedure is after 11 am, take 2 additional tablets at 6 am. For additional instructions refer to your colonoscopy prep instructions. 12/20/22  Yes Titi Navarro MD   polyethylene glycol (GOLYTELY) 236 g suspension The night before the exam at 6 pm drink an 8-ounce glass every 15 minutes until the jug is half empty. If you arrive before 11 AM: Drink the other half of the Golytely jug at 11 PM night before procedure. If you arrive after 11 AM: Drink the other half of the Golytely jug at 6 AM day of procedure. For additional instructions refer to your colonoscopy prep instructions. 12/20/22  Yes Titi Navarro MD   albuterol (PROAIR HFA/PROVENTIL HFA/VENTOLIN HFA) 108 (90 Base) MCG/ACT inhaler INHALE 2 PUFFS INTO THE LUNGS EVERY 6 HOURS AS NEEDED FOR SHORTNESS OF BREATH OR DIFFICULT BREATHING OR WHEEZING 8/31/22   Johana Tamez MD   aspirin (ASA) 81 MG chewable tablet Take 1 tablet (81 mg) by mouth daily 8/24/21   Johana Tamez MD   atorvastatin (LIPITOR) 40 MG tablet Take 1 tablet (40 mg) by mouth daily 12/6/22   Johana Tamez MD   blood glucose (NO BRAND SPECIFIED) lancets standard Use to test blood sugar 2 times daily or as directed. 6/23/22   Johana Tamez MD   blood glucose (NO BRAND SPECIFIED) test strip Use to test blood sugar 2 times daily or as directed. 6/23/22   Johana Tamez MD   blood glucose monitoring (NO BRAND SPECIFIED) meter device kit Use to test blood sugar 2 times daily or as directed. 6/23/22   Johana Tamez MD   ciprofloxacin (CIPRO) 500 MG tablet Take 1 tablet (500 mg) by mouth 2 times daily 5/17/22   Johana Tamez MD   fluticasone (FLONASE) 50 MCG/ACT nasal spray SHAKE LIQUID AND USE 1 SPRAY IN EACH NOSTRIL DAILY 9/7/21    Johana Tamez MD   Fluticasone-Salmeterol (ADVAIR HFA IN)     Reported, Patient   fluticasone-salmeterol (ADVAIR HFA) 115-21 MCG/ACT inhaler Inhale 2 puffs into the lungs 2 times daily 22   Johana Tamez MD   ipratropium - albuterol 0.5 mg/2.5 mg/3 mL (DUONEB) 0.5-2.5 (3) MG/3ML neb solution USE 1 VIAL VIA NEBULIZER EVERY 4 HOURS AS NEEDED FOR SHORTNESS OF BREATH, DYSPNEA,OR WHEEZING 21   Johana Tamez MD   losartan (COZAAR) 100 MG tablet Take 1 tablet (100 mg) by mouth daily 22   Johana Tamez MD   metFORMIN (GLUCOPHAGE XR) 500 MG 24 hr tablet TAKE 1 TABLET(500 MG) BY MOUTH TWICE a   DAY WITH MEALS 22   Johana Tamez MD   metroNIDAZOLE (FLAGYL) 500 MG tablet Take 1 tablet (500 mg) by mouth 3 times daily 22   Johana Tamez MD   Multiple Vitamin (MULTIVITAMIN ADULT PO) Take 1 tablet by mouth daily    Reported, Patient   varenicline (CHANTIX CLEMENTINA) 0.5 MG X 11 & 1 MG X 42 tablet Take 0.5 mg tab daily for 3 days, THEN 0.5 mg tab twice daily for 4 days, THEN 1 mg twice daily. 22   Johana Tamez MD   varenicline (CHANTIX CLEMENTINA) 0.5 MG X 11 & 1 MG X 42 tablet Take 0.5 mg tab daily for 3 days, THEN 0.5 mg tab twice daily for 4 days, THEN 1 mg twice daily. 22   Johana Tamez MD   varenicline (CHANTIX) 1 MG tablet Take 1 tablet (1 mg) by mouth 2 times daily 22   Joel Cam,      No current Epic-ordered facility-administered medications on file.     Current Outpatient Medications Ordered in Epic   Medication     bisacodyl (DULCOLAX) 5 MG EC tablet     polyethylene glycol (GOLYTELY) 236 g suspension       No results for input(s): ABO, RH in the last 22797 hours.  No results for input(s): HCG in the last 40210 hours.  No results found for this or any previous visit (from the past 744 hour(s)).Anesthesia Pre-Procedure Evaluation    Patient: Martin Anguiano   MRN: [id number] : 1965        Procedure : Procedure(s):  ESOPHAGOGASTRODUODENOSCOPY (EGD)  COLONOSCOPY      "     Past Medical History:   Diagnosis Date     HTN (hypertension)      Hypercholesteremia      JOJO (obstructive sleep apnea)      Pre-diabetes       Past Surgical History:   Procedure Laterality Date     NO HISTORY OF SURGERY        Allergies   Allergen Reactions     Ibuprofen      Other reaction(s): Other - Describe In Comment Field  Uri type of symptoms; \"watery eyes\". Gel caps are  OK  Eyes water,runny nose     Lisinopril Cough      Social History     Tobacco Use     Smoking status: Former     Packs/day: 1.00     Years: 20.00     Pack years: 20.00     Types: Cigarettes     Quit date: 10/4/2016     Years since quittin.2     Smokeless tobacco: Never     Tobacco comments:     last 10 years were off and on, about 2016   Substance Use Topics     Alcohol use: Yes     Alcohol/week: 0.0 standard drinks     Comment: 1-2 times per week, 2 drinks      Wt Readings from Last 1 Encounters:   22 120.7 kg (266 lb)        Anesthesia Evaluation   Pt has had prior anesthetic.     No history of anesthetic complications       ROS/MED HX  ENT/Pulmonary:     (+) sleep apnea, uses CPAP, tobacco use, Past use,     Neurologic:       Cardiovascular:     (+) hypertension-----    METS/Exercise Tolerance:     Hematologic:       Musculoskeletal:       GI/Hepatic:     (+) GERD, Asymptomatic on medication,     Renal/Genitourinary:       Endo:     (+) type II DM, Obesity,     Psychiatric/Substance Use:       Infectious Disease:       Malignancy:       Other:            Physical Exam    Airway        Mallampati: I    Neck ROM: full     Respiratory Devices and Support         Dental  no notable dental history     (+) caps      Cardiovascular   cardiovascular exam normal          Pulmonary   pulmonary exam normal                OUTSIDE LABS:  CBC:   Lab Results   Component Value Date    WBC 9.8 2019    HGB 15.5 2019    HCT 43.9 2019     2019     BMP:   Lab Results   Component Value Date     " 12/06/2022     04/13/2022    POTASSIUM 4.5 12/06/2022    POTASSIUM 4.3 04/13/2022    CHLORIDE 105 12/06/2022    CHLORIDE 106 04/13/2022    CO2 22 12/06/2022    CO2 25 04/13/2022    BUN 18.2 12/06/2022    BUN 16 04/13/2022    CR 0.95 12/06/2022    CR 0.88 04/13/2022     (H) 12/06/2022     (H) 04/13/2022     COAGS: No results found for: PTT, INR, FIBR  POC: No results found for: BGM, HCG, HCGS  HEPATIC:   Lab Results   Component Value Date    ALBUMIN 4.5 12/06/2022    PROTTOTAL 7.4 12/06/2022    ALT 38 12/06/2022    AST 28 12/06/2022    ALKPHOS 79 12/06/2022    BILITOTAL 0.4 12/06/2022     OTHER:   Lab Results   Component Value Date    A1C 6.2 (H) 12/06/2022    JAY 9.2 12/06/2022       Anesthesia Plan    ASA Status:  2   NPO Status:  NPO Appropriate    Anesthesia Type: MAC.     - Reason for MAC: immobility needed, straight local not clinically adequate              Consents    Anesthesia Plan(s) and associated risks, benefits, and realistic alternatives discussed. Questions answered and patient/representative(s) expressed understanding.    - Discussed:     - Discussed with:  Patient         Postoperative Care    Pain management: IV analgesics.   PONV prophylaxis: Ondansetron (or other 5HT-3)     Comments:                Jaylon De La Cruz MD

## 2022-12-29 NOTE — ANESTHESIA POSTPROCEDURE EVALUATION
Patient: Martin Anguiano    Procedure: Procedure(s):  ESOPHAGOGASTRODUODENOSCOPY, WITH BALLOON DILATION OF LESS THAN 30 MILLIMETERS  COLONOSCOPY, FLEXIBLE, WITH LESION REMOVAL USING SNARE  ESOPHAGOGASTRODUODENOSCOPY, WITH BIOPSY  ESOPHAGOGASTRODUODENOSCOPY, WITH LESION REMOVAL USING SNARE       Anesthesia Type:  MAC    Note:     Postop Pain Control: Uneventful            Sign Out: Well controlled pain   PONV: No   Neuro/Psych: Uneventful            Sign Out: Acceptable/Baseline neuro status   Airway/Respiratory: Uneventful            Sign Out: Acceptable/Baseline resp. status   CV/Hemodynamics: Uneventful            Sign Out: Acceptable CV status   Other NRE: NONE   DID A NON-ROUTINE EVENT OCCUR?            Last vitals:  Vitals Value Taken Time   /70 12/29/22 0910   Temp     Pulse 50 12/29/22 0928   Resp 10 12/29/22 0928   SpO2 92 % 12/29/22 0922   Vitals shown include unvalidated device data.    Electronically Signed By: Zackery Tinsley MD  December 29, 2022  11:26 AM

## 2022-12-29 NOTE — ANESTHESIA CARE TRANSFER NOTE
Patient: Martin Anguiano    Procedure: Procedure(s):  ESOPHAGOGASTRODUODENOSCOPY, WITH BALLOON DILATION OF LESS THAN 30 MILLIMETERS  COLONOSCOPY, FLEXIBLE, WITH LESION REMOVAL USING SNARE  ESOPHAGOGASTRODUODENOSCOPY, WITH BIOPSY  ESOPHAGOGASTRODUODENOSCOPY, WITH LESION REMOVAL USING SNARE       Diagnosis: Esophageal dysphagia [R13.19]  Heartburn [R12]  Diverticulitis of colon [K57.32]  Diagnosis Additional Information: No value filed.    Anesthesia Type:   MAC     Note:    Oropharynx: oropharynx clear of all foreign objects and spontaneously breathing  Level of Consciousness: awake    Level of Supplemental Oxygen (L/min / FiO2): 2  Independent Airway: airway patency satisfactory and stable  Dentition: dentition unchanged  Vital Signs Stable: post-procedure vital signs reviewed and stable  Report to RN Given: handoff report given  Patient transferred to: Phase II (endo)    Handoff Report: Identifed the Patient, Identified the Reponsible Provider, Reviewed the pertinent medical history, Discussed the surgical course, Reviewed Intra-OP anesthesia mangement and issues during anesthesia, Set expectations for post-procedure period and Allowed opportunity for questions and acknowledgement of understanding      Vitals:  Vitals Value Taken Time   BP 97/65 12/29/22 0849   Temp     Pulse 56 12/29/22 0853   Resp 18 12/29/22 0853   SpO2 94 % 12/29/22 0853   Vitals shown include unvalidated device data.    Electronically Signed By: JUVENAL Watson CRNA  December 29, 2022  8:54 AM

## 2022-12-29 NOTE — H&P
"Pre-Endoscopy History and Physical     Martin ORTIZ [facility] MRN# [id number]   YOB: 1965 Age: 57 year old     Date of Procedure: 12/29/2022  Primary care provider: Johana Tamez  Type of Endoscopy: colonoscopy and esophagogastroduodenoscopy (upper GI endoscopy)  Reason for Procedure: screening (diverticulitis in February), dysphagia  Type of Anesthesia Anticipated: Monitored anesthesia care    HPI:    Martin is a 57 year old male who will be undergoing the above procedure.      A history and physical has been performed. The patient's medications and allergies have been reviewed. The risks and benefits of the procedure and the sedation options and risks were discussed with the patient.  All questions were answered and informed consent was obtained.      He denies a personal or family history of anesthesia complications or bleeding disorders.     Allergies   Allergen Reactions     Ibuprofen      Other reaction(s): Other - Describe In Comment Field  Uri type of symptoms; \"watery eyes\". Gel caps are  OK  Eyes water,runny nose     Lisinopril Cough        Current Facility-Administered Medications   Medication     lidocaine (LMX4) cream     lidocaine 1 % 0.1-1 mL     ondansetron (ZOFRAN) injection 4 mg     sodium chloride (PF) 0.9% PF flush 3 mL     sodium chloride (PF) 0.9% PF flush 3 mL       Patient Active Problem List   Diagnosis     Benign essential hypertension     JOJO (obstructive sleep apnea)     Family history of diabetes mellitus     Glucose intolerance (impaired glucose tolerance)     Non morbid obesity due to excess calories     Screening for prostate cancer     Mixed hyperlipidemia     Tobacco use disorder 19-48y/o @ 1.5ppd for 15 yrs then 1ppd=31-32 pk yr hx     Chronic coronary artery disease     Impotence of organic origin     Family history of coronary artery disease     Gastroesophageal reflux disease     History of vasectomy     Hypercholesterolemia     Vitamin D deficiency     Cellulitis and " abscess of leg-healing and closing      Obesity (BMI 35.0-39.9) with comorbidity (H)     Elevated fasting glucose     Diabetes mellitus, type 2 (H)     Elevated coronary artery calcium score     Esophageal dysphagia     Diverticulitis of colon     Heartburn     Dysphonia     Bilateral thumb pain        Past Medical History:   Diagnosis Date     HTN (hypertension)      Hypercholesteremia      JOJO (obstructive sleep apnea)      Pre-diabetes         Past Surgical History:   Procedure Laterality Date     NO HISTORY OF SURGERY         Social History     Tobacco Use     Smoking status: Former     Packs/day: 1.00     Years: 20.00     Pack years: 20.00     Types: Cigarettes     Quit date: 10/4/2016     Years since quittin.2     Smokeless tobacco: Never     Tobacco comments:     last 10 years were off and on, about 2016   Substance Use Topics     Alcohol use: Yes     Alcohol/week: 0.0 standard drinks     Comment: 1-2 times per week, 2 drinks       Family History   Problem Relation Age of Onset     Glaucoma Mother      Diabetes Mother      Hypertension Father      Hyperlipidemia Father      Diabetes Brother      Coronary Artery Disease No family hx of      Cerebrovascular Disease No family hx of      Breast Cancer No family hx of      Colon Cancer No family hx of      Prostate Cancer No family hx of      Other Cancer No family hx of      Depression No family hx of      Anxiety Disorder No family hx of      Mental Illness No family hx of      Substance Abuse No family hx of      Anesthesia Reaction No family hx of      Asthma No family hx of      Osteoporosis No family hx of      Genetic Disorder No family hx of      Thyroid Disease No family hx of      Obesity No family hx of      Unknown/Adopted No family hx of        REVIEW OF SYSTEMS:   5 point ROS negative except as noted above in HPI, including Gen., Resp., CV, GI &  system review.    PHYSICAL EXAM:   BP (!) 141/88   Resp 16   Wt 120.7 kg (266 lb)   SpO2 95%   " BMI 38.17 kg/m   Estimated body mass index is 38.17 kg/m  as calculated from the following:    Height as of 12/6/22: 1.778 m (5' 10\").    Weight as of this encounter: 120.7 kg (266 lb).   GENERAL APPEARANCE: healthy and over weight  MENTAL STATUS: alert or interactive  AIRWAY EXAM: Mallampatti Class I (visualization of the soft palate, fauces, uvula, anterior and posterior pillars)  RESP: lungs clear to auscultation - no rales, rhonchi or wheezes  CV: regular rates and rhythm, normal S1 S2, no S3 or S4 and no murmur, click or rub    DIAGNOSTICS:    Not indicated    IMPRESSION   ASA Class 2 - Mild systemic disease    PLAN:   Colonoscopy and endoscopy    The above has been forwarded to the consulting provider.      Signed Electronically by: Titi Navarro MD  December 29, 2022        "

## 2023-01-02 LAB
PATH REPORT.COMMENTS IMP SPEC: NORMAL
PATH REPORT.FINAL DX SPEC: NORMAL
PATH REPORT.GROSS SPEC: NORMAL
PATH REPORT.MICROSCOPIC SPEC OTHER STN: NORMAL
PATH REPORT.MICROSCOPIC SPEC OTHER STN: NORMAL
PATH REPORT.RELEVANT HX SPEC: NORMAL
PHOTO IMAGE: NORMAL

## 2023-01-03 NOTE — RESULT ENCOUNTER NOTE
Johana-    Adenomatous polyps are benign, but have malignant potential. This increases the risk of colon cancer and impacts the frequency of colonoscopy. Based on the updated polypectomy surveillance recommendations for average risk adults, individuals with 1-2 adenomas <10 mm should undergo surveillance colonoscopy in 7-10 years. The biopsies confirm the presence of peptic duodenitis. Hyperplastic gastric polyps are benign, but have a malignant potential. This polyp had low grade dysplasia, but no evidence of high grade dysplasia or malignancy.      Recommendations: Surveillance colonoscopy in 7 years (2029). Consider an H. pylori breath test or stool antigen; treat if positive. Consider treating peptic duodenitis with omeprazole 20 mg PO in AM daily for 2 months. Consider surveillance endoscopy in 3 - 5 years.

## 2023-03-27 ENCOUNTER — TELEPHONE (OUTPATIENT)
Dept: SLEEP MEDICINE | Facility: CLINIC | Age: 58
End: 2023-03-27
Payer: COMMERCIAL

## 2023-03-31 ENCOUNTER — TELEPHONE (OUTPATIENT)
Dept: SLEEP MEDICINE | Facility: CLINIC | Age: 58
End: 2023-03-31
Payer: COMMERCIAL

## 2023-03-31 NOTE — TELEPHONE ENCOUNTER
General Call    Contacts       Type Contact Phone/Fax    03/31/2023 10:20 AM CDT Phone (Incoming) Jose Anguiano (Self) [phone] ()        Reason for Call:  Needs cpap prescription    What are your questions or concerns:  Wanted you to send a prescription for a cpap to  [email or web address] and patient. So they could set it up    Date of last appointment with provider: awhile ago    Could we [facility] this information to you in [facility] or would you prefer to receive a phone call?:   Patient would prefer a phone call   Okay to leave a detailed message?: Yes at Home number on file [phone] (home)

## 2023-03-31 NOTE — TELEPHONE ENCOUNTER
Pt notified 2021 clinic note and cpap order will be faxed to [email or web address] at [phone].  Pt states that [facility] is ok with past orders.    Per pt request copy has also been emailed to home to email on file.      REHANA Preston

## 2023-04-30 ENCOUNTER — HEALTH MAINTENANCE LETTER (OUTPATIENT)
Age: 58
End: 2023-04-30

## 2023-06-20 DIAGNOSIS — I10 BENIGN ESSENTIAL HYPERTENSION: ICD-10-CM

## 2023-06-20 RX ORDER — LOSARTAN POTASSIUM 100 MG/1
100 TABLET ORAL DAILY
Qty: 30 TABLET | Refills: 0 | Status: SHIPPED | OUTPATIENT
Start: 2023-06-20 | End: 2023-09-06

## 2023-06-20 NOTE — TELEPHONE ENCOUNTER
Prescription approved per [facility] Refill Protocol.  1 month refill only; patient due for appointment (follow-up: diabetes)  Nicky CALLE RN

## 2023-07-17 DIAGNOSIS — R05.9 COUGH: ICD-10-CM

## 2023-07-17 DIAGNOSIS — E11.9 TYPE 2 DIABETES MELLITUS WITHOUT COMPLICATION, WITHOUT LONG-TERM CURRENT USE OF INSULIN (H): ICD-10-CM

## 2023-07-18 RX ORDER — METFORMIN HCL 500 MG
TABLET, EXTENDED RELEASE 24 HR ORAL
Qty: 60 TABLET | Refills: 0 | Status: SHIPPED | OUTPATIENT
Start: 2023-07-18 | End: 2023-08-21

## 2023-07-18 RX ORDER — ALBUTEROL SULFATE 90 UG/1
2 AEROSOL, METERED RESPIRATORY (INHALATION) EVERY 6 HOURS PRN
Qty: 6.7 G | Refills: 0 | Status: SHIPPED | OUTPATIENT
Start: 2023-07-18 | End: 2023-09-26

## 2023-07-18 NOTE — TELEPHONE ENCOUNTER
Prescription approved per [facility] Refill Protocol.  1 month refill only; patient due for appointment (follow-up)  Nicky CLALE RN

## 2023-08-21 ENCOUNTER — TELEPHONE (OUTPATIENT)
Dept: FAMILY MEDICINE | Facility: CLINIC | Age: 58
End: 2023-08-21
Payer: COMMERCIAL

## 2023-08-21 DIAGNOSIS — E11.9 TYPE 2 DIABETES MELLITUS WITHOUT COMPLICATION, WITHOUT LONG-TERM CURRENT USE OF INSULIN (H): ICD-10-CM

## 2023-08-21 RX ORDER — METFORMIN HCL 500 MG
TABLET, EXTENDED RELEASE 24 HR ORAL
Qty: 60 TABLET | Refills: 0 | Status: SHIPPED | OUTPATIENT
Start: 2023-08-21 | End: 2023-10-04

## 2023-08-21 NOTE — TELEPHONE ENCOUNTER
Medication Question or Refill        What medication are you calling about (include dose and sig)?: Metformin 500 MG    Preferred Pharmacy:   [id number] - [place]  [address] AT [facility] [address]  [facility] [address] [address]  Phone: [phone] Fax: [phone]      Controlled Substance Agreement on file:   CSA -- Patient Level:    CSA: None found at the patient level.       Who prescribed the medication?: Johana Andres    Do you need a refill? Yes    When did you use the medication last?  today    Patient offered an appointment? Yes: 9/26/23    Do you have any questions or concerns?  Yes: patient is requesting a refill for his metformin as he only has 1 weeks left. He has an appointment, but not until 9/26. Please send in Rx for metformin to get him by.       Could we send this information to you in [employer] or would you prefer to receive a phone call?:   Patient would prefer a phone call   Okay to leave a detailed message?: Yes at Cell number on file:    Telephone Information:   Mobile [phone]

## 2023-09-06 DIAGNOSIS — I10 BENIGN ESSENTIAL HYPERTENSION: ICD-10-CM

## 2023-09-06 RX ORDER — LOSARTAN POTASSIUM 100 MG/1
100 TABLET ORAL DAILY
Qty: 30 TABLET | Refills: 0 | Status: SHIPPED | OUTPATIENT
Start: 2023-09-06 | End: 2023-09-26

## 2023-09-06 NOTE — TELEPHONE ENCOUNTER
"Requested Prescriptions   Pending Prescriptions Disp Refills    losartan (COZAAR) 100 MG tablet 30 tablet 0     Sig: Take 1 tablet (100 mg) by mouth daily       Angiotensin-II Receptors Passed - 9/6/2023  8:38 AM        Passed - Last blood pressure under 140/90 in past 12 months     BP Readings from Last 3 Encounters:   12/29/22 106/70   12/26/22 129/81   12/06/22 125/74                 Passed - Recent (12 mo) or future (30 days) visit within the authorizing provider's specialty     Patient has had an office visit with the authorizing provider or a provider within the authorizing providers department within the previous 12 mos or has a future within next 30 days. See \"Patient Info\" tab in inbasket, or \"Choose Columns\" in Meds & Orders section of the refill encounter.              Passed - Medication is active on med list        Passed - Patient is age 18 or older        Passed - Normal serum creatinine on file in past 12 months     Recent Labs   Lab Test 12/06/22  1152   CR 0.95       Ok to refill medication if creatinine is low          Passed - Normal serum potassium on file in past 12 months     Recent Labs   Lab Test 12/06/22  1152   POTASSIUM 4.5                      Prescription approved per [facility] Refill Protocol.     Pt has a appointment on 09/26/23    Deepthi Bermudez RN  [facility]   " Keep your intake of Vitamin K Regular. The highest amount of vitamin K is found in green and leafy vegetables like broccoli, lettuces, cabbage and spinach. You can eat these foods however keep the amount the same as changes in the amount you eat can affect your INR blood tests. Contact your doctor before making any major changes in your diet.    Limit your alcohol intake.

## 2023-09-19 ASSESSMENT — ASTHMA QUESTIONNAIRES: ACT_TOTALSCORE: 21

## 2023-09-26 ENCOUNTER — OFFICE VISIT (OUTPATIENT)
Dept: FAMILY MEDICINE | Facility: CLINIC | Age: 58
End: 2023-09-26
Payer: COMMERCIAL

## 2023-09-26 VITALS
RESPIRATION RATE: 15 BRPM | HEART RATE: 47 BPM | DIASTOLIC BLOOD PRESSURE: 73 MMHG | WEIGHT: 261 LBS | BODY MASS INDEX: 36.54 KG/M2 | SYSTOLIC BLOOD PRESSURE: 127 MMHG | HEIGHT: 71 IN | TEMPERATURE: 98.2 F | OXYGEN SATURATION: 97 %

## 2023-09-26 DIAGNOSIS — E78.00 HYPERCHOLESTEROLEMIA: ICD-10-CM

## 2023-09-26 DIAGNOSIS — E66.01 MORBID OBESITY (H): ICD-10-CM

## 2023-09-26 DIAGNOSIS — J45.30 MILD PERSISTENT ASTHMA WITHOUT COMPLICATION: ICD-10-CM

## 2023-09-26 DIAGNOSIS — E11.9 TYPE 2 DIABETES MELLITUS WITHOUT COMPLICATION, WITHOUT LONG-TERM CURRENT USE OF INSULIN (H): Primary | ICD-10-CM

## 2023-09-26 DIAGNOSIS — M25.561 ACUTE PAIN OF RIGHT KNEE: ICD-10-CM

## 2023-09-26 DIAGNOSIS — M79.671 RIGHT FOOT PAIN: ICD-10-CM

## 2023-09-26 DIAGNOSIS — I10 BENIGN ESSENTIAL HYPERTENSION: ICD-10-CM

## 2023-09-26 DIAGNOSIS — J40 BRONCHITIS: ICD-10-CM

## 2023-09-26 LAB
ALBUMIN SERPL BCG-MCNC: 4.6 G/DL (ref 3.5–5.2)
ALP SERPL-CCNC: 76 U/L (ref 40–129)
ALT SERPL W P-5'-P-CCNC: 32 U/L (ref 0–70)
ANION GAP SERPL CALCULATED.3IONS-SCNC: 11 MMOL/L (ref 7–15)
AST SERPL W P-5'-P-CCNC: 30 U/L (ref 0–45)
BILIRUB SERPL-MCNC: 0.6 MG/DL
BUN SERPL-MCNC: 18.3 MG/DL (ref 6–20)
CALCIUM SERPL-MCNC: 9.2 MG/DL (ref 8.6–10)
CHLORIDE SERPL-SCNC: 105 MMOL/L (ref 98–107)
CHOLEST SERPL-MCNC: 128 MG/DL
CREAT SERPL-MCNC: 0.96 MG/DL (ref 0.67–1.17)
DEPRECATED HCO3 PLAS-SCNC: 23 MMOL/L (ref 22–29)
EGFRCR SERPLBLD CKD-EPI 2021: >90 ML/MIN/1.73M2
GLUCOSE SERPL-MCNC: 110 MG/DL (ref 70–99)
HBA1C MFR BLD: 5.9 % (ref 0–5.6)
HDLC SERPL-MCNC: 49 MG/DL
LDLC SERPL CALC-MCNC: 58 MG/DL
NONHDLC SERPL-MCNC: 79 MG/DL
POTASSIUM SERPL-SCNC: 4.5 MMOL/L (ref 3.4–5.3)
PROT SERPL-MCNC: 7.2 G/DL (ref 6.4–8.3)
SODIUM SERPL-SCNC: 139 MMOL/L (ref 135–145)
TRIGL SERPL-MCNC: 106 MG/DL

## 2023-09-26 PROCEDURE — 80061 LIPID PANEL: CPT | Performed by: FAMILY MEDICINE

## 2023-09-26 PROCEDURE — 83036 HEMOGLOBIN GLYCOSYLATED A1C: CPT | Performed by: FAMILY MEDICINE

## 2023-09-26 PROCEDURE — 36415 COLL VENOUS BLD VENIPUNCTURE: CPT | Performed by: FAMILY MEDICINE

## 2023-09-26 PROCEDURE — 80053 COMPREHEN METABOLIC PANEL: CPT | Performed by: FAMILY MEDICINE

## 2023-09-26 PROCEDURE — 99214 OFFICE O/P EST MOD 30 MIN: CPT | Performed by: FAMILY MEDICINE

## 2023-09-26 RX ORDER — ATORVASTATIN CALCIUM 40 MG/1
40 TABLET, FILM COATED ORAL DAILY
Qty: 90 TABLET | Refills: 1 | Status: SHIPPED | OUTPATIENT
Start: 2023-09-26 | End: 2024-04-23

## 2023-09-26 RX ORDER — FLUTICASONE PROPIONATE AND SALMETEROL XINAFOATE 115; 21 UG/1; UG/1
2 AEROSOL, METERED RESPIRATORY (INHALATION) 2 TIMES DAILY
Qty: 12 G | Refills: 3 | Status: SHIPPED | OUTPATIENT
Start: 2023-09-26

## 2023-09-26 RX ORDER — LOSARTAN POTASSIUM 100 MG/1
100 TABLET ORAL DAILY
Qty: 90 TABLET | Refills: 1 | Status: SHIPPED | OUTPATIENT
Start: 2023-09-26 | End: 2024-04-23

## 2023-09-26 RX ORDER — ALBUTEROL SULFATE 90 UG/1
2 AEROSOL, METERED RESPIRATORY (INHALATION) EVERY 6 HOURS PRN
Qty: 6.7 G | Refills: 4 | Status: SHIPPED | OUTPATIENT
Start: 2023-09-26 | End: 2024-05-20

## 2023-09-26 ASSESSMENT — PAIN SCALES - GENERAL: PAINLEVEL: NO PAIN (0)

## 2023-09-26 NOTE — PROGRESS NOTES
"  Assessment & Plan     Type 2 diabetes mellitus without complication, without long-term current use of insulin (H)  Checked hgb A1 c today which is now better than it used to be , now is   - HEMOGLOBIN A1C; Future  - Comprehensive metabolic panel (BMP + Alb, Alk Phos, ALT, AST, Total. Bili, TP); Future  - HEMOGLOBIN A1C  - Comprehensive metabolic panel (BMP + Alb, Alk Phos, ALT, AST, Total. Bili, TP)    Benign essential hypertension  Is well controlled on the current medication losartan 100 mg daily , no side effects , refilled     Hypercholesterolemia  Will check fasting lipids today , he has been on Lipitor 40 mg and no side effects   - Comprehensive metabolic panel (BMP + Alb, Alk Phos, ALT, AST, Total. Bili, TP); Future  - Lipid panel reflex to direct LDL Fasting; Future  - Comprehensive metabolic panel (BMP + Alb, Alk Phos, ALT, AST, Total. Bili, TP)  - Lipid panel reflex to direct LDL Fasting    Morbid obesity (H)  Healthy diet and regular physical activities discussed he has lost over 5 lbs since last visit     Right foot pain  Seems that got worse in the past month or so after slipping off a ladder at work and twisting his right knee   Discussed rest, ice , elevation at night , which he has been doing for the most part but would need to see a podiatrist and I gave him the referral   - Orthopedic  Referral; Future    Acute pain of right knee  As above after an injury , although the knee is getting better but now the ankle is worse              BMI:   Estimated body mass index is 36.4 kg/m  as calculated from the following:    Height as of this encounter: 1.803 m (5' 11\").    Weight as of this encounter: 118.4 kg (261 lb).           Johana Tamez MD  [facility]    Cr Garcia is a 58 year old, presenting for the following health issues:  Diabetes  Not checked home glucose     History of Present Illness       Diabetes:   He presents for follow up of diabetes.    He is not " checking blood glucose.   Blood glucose is never over 200 (Unknown) and never under 70 (Unknown).    He is concerned about frequent infections.   He is having numbness in feet.  The patient has not had a diabetic eye exam in the last 12 months.      He consumes 0 sweetened beverage(s) daily.He exercises with enough effort to increase his heart rate 20 to 29 minutes per day.  He exercises with enough effort to increase his heart rate 3 or less days per week.   He is taking medications regularly.       ? Hurt right knee fell off a ladder injured right knee Ris worse than left because of this sea salt soaks had a scrape   2) left knee cracking , right knee injured slipped off a ladder wait   Medication Followup of metFORMIN, Atorvastatin, Losartan   Taking Medication as prescribed: yes  Side Effects:  None - concerns about cartilage wearing away  Medication Helping Symptoms:  yes        Review of Systems   Constitutional, HEENT, cardiovascular, pulmonary, GI, , musculoskeletal, neuro, skin, endocrine and psych systems are negative, except as otherwise noted.      Objective    There were no vitals taken for this visit.  There is no height or weight on file to calculate BMI.  Physical Exam   GENERAL: healthy, alert and no distress  EYES: Eyes grossly normal to inspection, PERRL and conjunctivae and sclerae normal  HENT: ear canals and TM's normal, nose and mouth without ulcers or lesions  NECK: no adenopathy, no asymmetry, masses, or scars and thyroid normal to palpation  RESP: lungs clear to auscultation - no rales, rhonchi or wheezes  CV: regular rate and rhythm, normal S1 S2, no S3 or S4, no murmur, click or rub, no peripheral edema and peripheral pulses strong  ABDOMEN: soft, nontender, no hepatosplenomegaly, no masses and bowel sounds normal  MS: no gross musculoskeletal defects noted, no edema  SKIN: no suspicious lesions or rashes  NEURO: Normal strength and tone, mentation intact and speech normal  PSYCH:  mentation appears normal, affect normal/bright    Results for orders placed or performed in visit on 09/26/23 (from the past 24 hour(s))   HEMOGLOBIN A1C   Result Value Ref Range    Hemoglobin A1C 5.9 (H) 0.0 - 5.6 %

## 2023-10-04 DIAGNOSIS — E11.9 TYPE 2 DIABETES MELLITUS WITHOUT COMPLICATION, WITHOUT LONG-TERM CURRENT USE OF INSULIN (H): ICD-10-CM

## 2023-10-05 RX ORDER — METFORMIN HCL 500 MG
TABLET, EXTENDED RELEASE 24 HR ORAL
Qty: 60 TABLET | Refills: 0 | Status: SHIPPED | OUTPATIENT
Start: 2023-10-05 | End: 2023-11-09

## 2023-11-06 ENCOUNTER — PATIENT OUTREACH (OUTPATIENT)
Dept: CARE COORDINATION | Facility: CLINIC | Age: 58
End: 2023-11-06
Payer: COMMERCIAL

## 2023-11-09 DIAGNOSIS — E11.9 TYPE 2 DIABETES MELLITUS WITHOUT COMPLICATION, WITHOUT LONG-TERM CURRENT USE OF INSULIN (H): ICD-10-CM

## 2023-11-09 RX ORDER — METFORMIN HCL 500 MG
TABLET, EXTENDED RELEASE 24 HR ORAL
Qty: 60 TABLET | Refills: 0 | Status: SHIPPED | OUTPATIENT
Start: 2023-11-09 | End: 2023-12-11

## 2023-11-20 ENCOUNTER — PATIENT OUTREACH (OUTPATIENT)
Dept: CARE COORDINATION | Facility: CLINIC | Age: 58
End: 2023-11-20
Payer: COMMERCIAL

## 2023-11-29 ENCOUNTER — PATIENT OUTREACH (OUTPATIENT)
Dept: GASTROENTEROLOGY | Facility: CLINIC | Age: 58
End: 2023-11-29
Payer: COMMERCIAL

## 2023-12-05 DIAGNOSIS — F17.210 CIGARETTE NICOTINE DEPENDENCE WITHOUT COMPLICATION: ICD-10-CM

## 2023-12-05 RX ORDER — VARENICLINE TARTRATE 1 MG/1
1 TABLET, FILM COATED ORAL 2 TIMES DAILY
Qty: 180 TABLET | Refills: 0 | Status: SHIPPED | OUTPATIENT
Start: 2023-12-05 | End: 2024-07-15

## 2023-12-06 RX ORDER — VARENICLINE TARTRATE 1 MG/1
1 TABLET, FILM COATED ORAL 2 TIMES DAILY
Qty: 180 TABLET | Refills: 0 | OUTPATIENT
Start: 2023-12-06

## 2023-12-11 DIAGNOSIS — E11.9 TYPE 2 DIABETES MELLITUS WITHOUT COMPLICATION, WITHOUT LONG-TERM CURRENT USE OF INSULIN (H): ICD-10-CM

## 2023-12-11 RX ORDER — METFORMIN HCL 500 MG
TABLET, EXTENDED RELEASE 24 HR ORAL
Qty: 180 TABLET | OUTPATIENT
Start: 2023-12-11

## 2023-12-11 RX ORDER — METFORMIN HCL 500 MG
TABLET, EXTENDED RELEASE 24 HR ORAL
Qty: 60 TABLET | Refills: 0 | Status: SHIPPED | OUTPATIENT
Start: 2023-12-11 | End: 2024-01-15

## 2023-12-18 DIAGNOSIS — R06.2 WHEEZING: ICD-10-CM

## 2023-12-18 RX ORDER — IPRATROPIUM BROMIDE AND ALBUTEROL SULFATE 2.5; .5 MG/3ML; MG/3ML
SOLUTION RESPIRATORY (INHALATION)
Qty: 360 ML | Refills: 2 | Status: SHIPPED | OUTPATIENT
Start: 2023-12-18 | End: 2024-07-16

## 2023-12-18 RX ORDER — PREDNISONE 10 MG/1
TABLET ORAL
Qty: 10 TABLET | Refills: 0 | OUTPATIENT
Start: 2023-12-18

## 2023-12-18 NOTE — TELEPHONE ENCOUNTER
LS,  Patient calling to request refills of nebulizer solution.  States he has not had to use this for a while, but now is needing to help manage breathing.  Pended if approved.  Nicky CALLE RN

## 2023-12-19 DIAGNOSIS — R06.2 WHEEZING: ICD-10-CM

## 2023-12-19 RX ORDER — IPRATROPIUM BROMIDE AND ALBUTEROL SULFATE 2.5; .5 MG/3ML; MG/3ML
SOLUTION RESPIRATORY (INHALATION)
OUTPATIENT
Start: 2023-12-19

## 2024-01-15 ENCOUNTER — TELEPHONE (OUTPATIENT)
Dept: FAMILY MEDICINE | Facility: CLINIC | Age: 59
End: 2024-01-15
Payer: COMMERCIAL

## 2024-01-15 DIAGNOSIS — E11.9 TYPE 2 DIABETES MELLITUS WITHOUT COMPLICATION, WITHOUT LONG-TERM CURRENT USE OF INSULIN (H): ICD-10-CM

## 2024-01-15 RX ORDER — METFORMIN HCL 500 MG
TABLET, EXTENDED RELEASE 24 HR ORAL
Qty: 180 TABLET | Refills: 0 | Status: SHIPPED | OUTPATIENT
Start: 2024-01-15 | End: 2024-04-17

## 2024-02-18 ENCOUNTER — HEALTH MAINTENANCE LETTER (OUTPATIENT)
Age: 59
End: 2024-02-18

## 2024-04-03 NOTE — PROGRESS NOTES
[facility]  SPORTS MEDICINE CLINIC VISIT     Apr 10, 2024         Ultrasound-guided first CMC injections bilateral    Date/Time: 4/10/2024 2:51 PM    Performed by: Isaiah Guy MD  Authorized by: Isaiah Guy MD    Indications:  Pain  Needle Size:  25 G  Guidance: landmark    Condition: osteoarthritis    Laterality:  Bilateral  Location:  Thumb  Site:  Bilateral thumb CMC    Medications (Right):  6 mg betamethasone acet & sod phos 6 (3-3) MG/ML  Medications (Left):  6 mg betamethasone acet & sod phos 6 (3-3) MG/ML  Outcome:  Tolerated well, no immediate complications  Procedure discussed: discussed risks, benefits, and alternatives    Consent Given by:  Patient  Timeout: timeout called immediately prior to procedure    Prep: patient was prepped and draped in usual sterile fashion      Patient was positioned with the right hand in a neutral position on exam table.  The area overlying the first CMC joint was cleaned with chlorhexidine swab.  Next using ultrasound guidance the CMC joint was identified.  Ultrasound was necessary due to the degree of osteoarthritis and the small size of the joint space.  The skin was anesthetized with ethyl chloride spray.  Next using direct and continuous ultrasound guidance a 25-gauge needle was introduced into the joint space.  A solution of 6 mg Celestone and 1 mL 1% lidocaine was injected and seen flowing into the joint space.  Images were captured and saved to the permanent record.  The area was covered with a bandage.  Next the identical procedure was repeated on the left first CMC joint.  The patient tolerated the procedure well.  There were no immediate complications.  Routine postinjection instructions were given to the patient.  Recommended follow-up should she develop any significant increase in pain, redness, swelling or drainage from the injection site.     Isaiah Guy MD

## 2024-04-04 DIAGNOSIS — M79.641 BILATERAL HAND PAIN: Primary | ICD-10-CM

## 2024-04-04 DIAGNOSIS — M79.642 BILATERAL HAND PAIN: Primary | ICD-10-CM

## 2024-04-10 ENCOUNTER — OFFICE VISIT (OUTPATIENT)
Dept: ORTHOPEDICS | Facility: CLINIC | Age: 59
End: 2024-04-10
Payer: COMMERCIAL

## 2024-04-10 ENCOUNTER — ANCILLARY PROCEDURE (OUTPATIENT)
Dept: GENERAL RADIOLOGY | Facility: CLINIC | Age: 59
End: 2024-04-10
Attending: FAMILY MEDICINE
Payer: COMMERCIAL

## 2024-04-10 DIAGNOSIS — M79.641 BILATERAL HAND PAIN: ICD-10-CM

## 2024-04-10 DIAGNOSIS — M79.644 BILATERAL THUMB PAIN: Primary | ICD-10-CM

## 2024-04-10 DIAGNOSIS — M79.642 BILATERAL HAND PAIN: ICD-10-CM

## 2024-04-10 DIAGNOSIS — M79.645 BILATERAL THUMB PAIN: Primary | ICD-10-CM

## 2024-04-10 PROCEDURE — 73130 X-RAY EXAM OF HAND: CPT | Mod: LT | Performed by: RADIOLOGY

## 2024-04-10 PROCEDURE — 20604 DRAIN/INJ JOINT/BURSA W/US: CPT | Mod: 50 | Performed by: FAMILY MEDICINE

## 2024-04-10 RX ADMIN — BETAMETHASONE SODIUM PHOSPHATE AND BETAMETHASONE ACETATE 6 MG: 3; 3 INJECTION, SUSPENSION INTRA-ARTICULAR; INTRALESIONAL; INTRAMUSCULAR; SOFT TISSUE at 14:51

## 2024-04-10 SDOH — HEALTH STABILITY: PHYSICAL HEALTH: ON AVERAGE, HOW MANY DAYS PER WEEK DO YOU ENGAGE IN MODERATE TO STRENUOUS EXERCISE (LIKE A BRISK WALK)?: 3 DAYS

## 2024-04-10 SDOH — HEALTH STABILITY: PHYSICAL HEALTH: ON AVERAGE, HOW MANY MINUTES DO YOU ENGAGE IN EXERCISE AT THIS LEVEL?: 30 MIN

## 2024-04-10 NOTE — LETTER
4/10/2024         RE: Martin Anguiano  [address]  [place] [address]        Dear Colleague,    Thank you for referring your patient, Martin Anguiano, to the [facility] SPORTS MEDICINE CLINIC [place]. Please see a copy of my visit note below.      [facility]  SPORTS MEDICINE CLINIC VISIT     Apr 10, 2024         Ultrasound-guided first CMC injections bilateral    Date/Time: 4/10/2024 2:51 PM    Performed by: Isaiah Guy MD  Authorized by: Isaiah Guy MD    Indications:  Pain  Needle Size:  25 G  Guidance: landmark    Condition: osteoarthritis    Laterality:  Bilateral  Location:  Thumb  Site:  Bilateral thumb CMC    Medications (Right):  6 mg betamethasone acet & sod phos 6 (3-3) MG/ML  Medications (Left):  6 mg betamethasone acet & sod phos 6 (3-3) MG/ML  Outcome:  Tolerated well, no immediate complications  Procedure discussed: discussed risks, benefits, and alternatives    Consent Given by:  Patient  Timeout: timeout called immediately prior to procedure    Prep: patient was prepped and draped in usual sterile fashion      Patient was positioned with the right hand in a neutral position on exam table.  The area overlying the first CMC joint was cleaned with chlorhexidine swab.  Next using ultrasound guidance the CMC joint was identified.  Ultrasound was necessary due to the degree of osteoarthritis and the small size of the joint space.  The skin was anesthetized with ethyl chloride spray.  Next using direct and continuous ultrasound guidance a 25-gauge needle was introduced into the joint space.  A solution of 6 mg Celestone and 1 mL 1% lidocaine was injected and seen flowing into the joint space.  Images were captured and saved to the permanent record.  The area was covered with a bandage.  Next the identical procedure was repeated on the left first CMC joint.  The patient tolerated the procedure well.  There were no immediate complications.   Routine postinjection instructions were given to the patient.  Recommended follow-up should she develop any significant increase in pain, redness, swelling or drainage from the injection site.     Isaiah Guy MD            Again, thank you for allowing me to participate in the care of your patient.        Sincerely,        Isaiah Guy MD

## 2024-04-13 RX ORDER — BETAMETHASONE SODIUM PHOSPHATE AND BETAMETHASONE ACETATE 3; 3 MG/ML; MG/ML
6 INJECTION, SUSPENSION INTRA-ARTICULAR; INTRALESIONAL; INTRAMUSCULAR; SOFT TISSUE
Status: SHIPPED | OUTPATIENT
Start: 2024-04-10

## 2024-04-17 DIAGNOSIS — E11.9 TYPE 2 DIABETES MELLITUS WITHOUT COMPLICATION, WITHOUT LONG-TERM CURRENT USE OF INSULIN (H): ICD-10-CM

## 2024-04-17 RX ORDER — METFORMIN HCL 500 MG
TABLET, EXTENDED RELEASE 24 HR ORAL
Qty: 60 TABLET | Refills: 1 | Status: SHIPPED | OUTPATIENT
Start: 2024-04-17 | End: 2024-04-23

## 2024-04-23 ENCOUNTER — OFFICE VISIT (OUTPATIENT)
Dept: FAMILY MEDICINE | Facility: CLINIC | Age: 59
End: 2024-04-23
Payer: COMMERCIAL

## 2024-04-23 VITALS
DIASTOLIC BLOOD PRESSURE: 87 MMHG | BODY MASS INDEX: 36.81 KG/M2 | OXYGEN SATURATION: 97 % | TEMPERATURE: 97.3 F | SYSTOLIC BLOOD PRESSURE: 138 MMHG | HEART RATE: 64 BPM | WEIGHT: 262.9 LBS | RESPIRATION RATE: 16 BRPM | HEIGHT: 71 IN

## 2024-04-23 DIAGNOSIS — I10 PRIMARY HYPERTENSION: Primary | ICD-10-CM

## 2024-04-23 DIAGNOSIS — E11.9 TYPE 2 DIABETES MELLITUS WITHOUT COMPLICATION, WITHOUT LONG-TERM CURRENT USE OF INSULIN (H): ICD-10-CM

## 2024-04-23 DIAGNOSIS — J45.30 MILD PERSISTENT ASTHMA WITHOUT COMPLICATION: ICD-10-CM

## 2024-04-23 DIAGNOSIS — M79.671 BILATERAL FOOT PAIN: ICD-10-CM

## 2024-04-23 DIAGNOSIS — Z12.5 SCREENING FOR PROSTATE CANCER: ICD-10-CM

## 2024-04-23 DIAGNOSIS — E66.01 MORBID OBESITY (H): ICD-10-CM

## 2024-04-23 DIAGNOSIS — E78.00 HYPERCHOLESTEROLEMIA: ICD-10-CM

## 2024-04-23 DIAGNOSIS — M79.672 BILATERAL FOOT PAIN: ICD-10-CM

## 2024-04-23 LAB
ALBUMIN SERPL BCG-MCNC: 4.5 G/DL (ref 3.5–5.2)
ALP SERPL-CCNC: 69 U/L (ref 40–150)
ALT SERPL W P-5'-P-CCNC: 50 U/L (ref 0–70)
ANION GAP SERPL CALCULATED.3IONS-SCNC: 11 MMOL/L (ref 7–15)
AST SERPL W P-5'-P-CCNC: 31 U/L (ref 0–45)
BILIRUB SERPL-MCNC: 0.5 MG/DL
BUN SERPL-MCNC: 18.6 MG/DL (ref 6–20)
CALCIUM SERPL-MCNC: 9.4 MG/DL (ref 8.6–10)
CHLORIDE SERPL-SCNC: 102 MMOL/L (ref 98–107)
CHOLEST SERPL-MCNC: 170 MG/DL
CREAT SERPL-MCNC: 1.03 MG/DL (ref 0.67–1.17)
CREAT UR-MCNC: 144 MG/DL
DEPRECATED HCO3 PLAS-SCNC: 24 MMOL/L (ref 22–29)
EGFRCR SERPLBLD CKD-EPI 2021: 85 ML/MIN/1.73M2
GLUCOSE SERPL-MCNC: 106 MG/DL (ref 70–99)
HBA1C MFR BLD: 6.1 % (ref 0–5.6)
HDLC SERPL-MCNC: 60 MG/DL
LDLC SERPL CALC-MCNC: 89 MG/DL
MICROALBUMIN UR-MCNC: <12 MG/L
MICROALBUMIN/CREAT UR: NORMAL MG/G{CREAT}
NONHDLC SERPL-MCNC: 110 MG/DL
POTASSIUM SERPL-SCNC: 4.5 MMOL/L (ref 3.4–5.3)
PROT SERPL-MCNC: 7.2 G/DL (ref 6.4–8.3)
PSA SERPL DL<=0.01 NG/ML-MCNC: 0.89 NG/ML (ref 0–3.5)
SODIUM SERPL-SCNC: 137 MMOL/L (ref 135–145)
TRIGL SERPL-MCNC: 107 MG/DL

## 2024-04-23 PROCEDURE — 83036 HEMOGLOBIN GLYCOSYLATED A1C: CPT | Performed by: FAMILY MEDICINE

## 2024-04-23 PROCEDURE — 99214 OFFICE O/P EST MOD 30 MIN: CPT | Performed by: FAMILY MEDICINE

## 2024-04-23 PROCEDURE — 80061 LIPID PANEL: CPT | Performed by: FAMILY MEDICINE

## 2024-04-23 PROCEDURE — 36415 COLL VENOUS BLD VENIPUNCTURE: CPT | Performed by: FAMILY MEDICINE

## 2024-04-23 PROCEDURE — G0103 PSA SCREENING: HCPCS | Performed by: FAMILY MEDICINE

## 2024-04-23 PROCEDURE — 82570 ASSAY OF URINE CREATININE: CPT | Performed by: FAMILY MEDICINE

## 2024-04-23 PROCEDURE — 82043 UR ALBUMIN QUANTITATIVE: CPT | Performed by: FAMILY MEDICINE

## 2024-04-23 PROCEDURE — 80053 COMPREHEN METABOLIC PANEL: CPT | Performed by: FAMILY MEDICINE

## 2024-04-23 RX ORDER — METFORMIN HCL 500 MG
TABLET, EXTENDED RELEASE 24 HR ORAL
Qty: 180 TABLET | Refills: 1 | Status: SHIPPED | OUTPATIENT
Start: 2024-04-23

## 2024-04-23 RX ORDER — LOSARTAN POTASSIUM 100 MG/1
100 TABLET ORAL DAILY
Qty: 90 TABLET | Refills: 1 | Status: SHIPPED | OUTPATIENT
Start: 2024-04-23

## 2024-04-23 RX ORDER — FLUTICASONE PROPIONATE AND SALMETEROL 250; 50 UG/1; UG/1
1 POWDER RESPIRATORY (INHALATION) DAILY
Qty: 60 EACH | Refills: 2 | Status: SHIPPED | OUTPATIENT
Start: 2024-04-23 | End: 2024-04-23

## 2024-04-23 RX ORDER — ATORVASTATIN CALCIUM 40 MG/1
40 TABLET, FILM COATED ORAL DAILY
Qty: 90 TABLET | Refills: 1 | Status: SHIPPED | OUTPATIENT
Start: 2024-04-23

## 2024-04-23 RX ORDER — FLUTICASONE PROPIONATE AND SALMETEROL 250; 50 UG/1; UG/1
1 POWDER RESPIRATORY (INHALATION) DAILY
Qty: 60 EACH | Refills: 2 | Status: SHIPPED | OUTPATIENT
Start: 2024-04-23

## 2024-04-23 ASSESSMENT — ASTHMA QUESTIONNAIRES
QUESTION_3 LAST FOUR WEEKS HOW OFTEN DID YOUR ASTHMA SYMPTOMS (WHEEZING, COUGHING, SHORTNESS OF BREATH, CHEST TIGHTNESS OR PAIN) WAKE YOU UP AT NIGHT OR EARLIER THAN USUAL IN THE MORNING: ONCE OR TWICE
QUESTION_2 LAST FOUR WEEKS HOW OFTEN HAVE YOU HAD SHORTNESS OF BREATH: ONCE OR TWICE A WEEK
QUESTION_4 LAST FOUR WEEKS HOW OFTEN HAVE YOU USED YOUR RESCUE INHALER OR NEBULIZER MEDICATION (SUCH AS ALBUTEROL): TWO OR THREE TIMES PER WEEK
ACT_TOTALSCORE: 19
QUESTION_1 LAST FOUR WEEKS HOW MUCH OF THE TIME DID YOUR ASTHMA KEEP YOU FROM GETTING AS MUCH DONE AT WORK, SCHOOL OR AT HOME: A LITTLE OF THE TIME
QUESTION_5 LAST FOUR WEEKS HOW WOULD YOU RATE YOUR ASTHMA CONTROL: WELL CONTROLLED
ACT_TOTALSCORE: 19

## 2024-04-23 ASSESSMENT — PAIN SCALES - GENERAL: PAINLEVEL: NO PAIN (0)

## 2024-04-23 NOTE — PROGRESS NOTES
Assessment & Plan     Primary hypertension  Will check labs today and also I have refilled his losartan , BP is well controlled   - Comprehensive metabolic panel (BMP + Alb, Alk Phos, ALT, AST, Total. Bili, TP); Future  - Comprehensive metabolic panel (BMP + Alb, Alk Phos, ALT, AST, Total. Bili, TP)   losartan (COZAAR) 100 MG tablet; Take 1 tablet (100 mg) by mouth daily    Hypercholesterolemia  Did fasting lipids , doing well on the lipitor 40 mg no side effects   Refilled   - atorvastatin (LIPITOR) 40 MG tablet; Take 1 tablet (40 mg) by mouth daily  - Lipid panel reflex to direct LDL Fasting; Future  - Lipid panel reflex to direct LDL Fasting    Type 2 diabetes mellitus without complication, without long-term current use of insulin (H)  His Hgb A1 c went up a bit from 5.9 back in September to 6.1 now , we discussed to increase metformin but pt would like to try diet changes and also walking more now that the weather is better   - Hemoglobin A1c; Future  - Comprehensive metabolic panel (BMP + Alb, Alk Phos, ALT, AST, Total. Bili, TP); Future  - Albumin Random Urine Quantitative with Creat Ratio; Future  - Hemoglobin A1c  - Comprehensive metabolic panel (BMP + Alb, Alk Phos, ALT, AST, Total. Bili, TP)  - metFORMIN (GLUCOPHAGE XR) 500 MG 24 hr tablet; TAKE 1 TABLET(500 MG) BY MOUTH TWICE DAILY WITH MEALS  - Continuous Glucose  (FREESTYLE CANDY 2 READER) EPI; Use to read blood sugars as per 's instructions.  - Orthopedic  Referral; Future  - Albumin Random Urine Quantitative with Creat Ratio    -  Morbid obesity (H)  Healthy diet and exercise recommended     Screening for prostate cancer  Will do today   - PSA, screen; Future  - PSA, screen    Mild persistent asthma without complication  Is stable , but will have a better coverage through [facility] with the Advair 250-50 and I have sent a new rx for this , he will use one puff daily , which has been working well for him   -  "fluticasone-salmeterol (ADVAIR) 250-50 MCG/ACT inhaler; Inhale 1 puff into the lungs daily    Bilateral foot pain  Getting worse , I have discussed and gave him a referral for podiatry to schedule   - Orthopedic  Referral; Future        BMI  Estimated body mass index is 36.67 kg/m  as calculated from the following:    Height as of this encounter: 1.803 m (5' 11\").    Weight as of this encounter: 119.3 kg (262 lb 14.4 oz).       RTC if no improving or worsening.  Pt is aware  and comfortable with the current plan.      Cr Garcia is a 58 year old, presenting for the following health issues:  Diabetes      4/23/2024     9:52 AM   Additional Questions   Roomed by Veena KRUGER   Accompanied by n/a     History of Present Illness       Reason for visit:  Blood chech    He eats 0-1 servings of fruits and vegetables daily.He consumes 0 sweetened beverage(s) daily.He exercises with enough effort to increase his heart rate 20 to 29 minutes per day.  He exercises with enough effort to increase his heart rate 4 days per week.   He is taking medications regularly.         Diabetes Follow-up    How often are you checking your blood sugar? Not at all  What concerns do you have today about your diabetes? None   Do you have any of these symptoms? (Select all that apply)  No numbness or tingling in feet.  No redness, sores or blisters on feet.  No complaints of excessive thirst.  No reports of blurry vision.  No significant changes to weight.  Have you had a diabetic eye exam in the last 12 months? No        BP Readings from Last 2 Encounters:   09/26/23 127/73   12/29/22 106/70     Hemoglobin A1C (%)   Date Value   09/26/2023 5.9 (H)   12/06/2022 6.2 (H)   05/25/2021 7.1 (H)   01/15/2021 6.3 (H)     LDL Cholesterol Calculated (mg/dL)   Date Value   09/26/2023 58   12/06/2022 62   01/15/2021 66   08/19/2019 73             2) HTN   3) high cholesterol follow up   4) feet pain cielo       Review of Systems  Constitutional, " [FreeTextEntry1] : PE and follow up on med problems "HEENT, cardiovascular, pulmonary, GI, , musculoskeletal, neuro, skin, endocrine and psych systems are negative, except as otherwise noted.      Objective    Resp 16   Ht 1.803 m (5' 11\")   Wt 119.3 kg (262 lb 14.4 oz)   BMI 36.67 kg/m    Body mass index is 36.67 kg/m .  Physical Exam   GENERAL: alert and no distress  EYES: Eyes grossly normal to inspection, PERRL and conjunctivae and sclerae normal  HENT: ear canals and TM's normal, nose and mouth without ulcers or lesions  NECK: no adenopathy, no asymmetry, masses, or scars  RESP: lungs clear to auscultation - no rales, rhonchi or wheezes  CV: regular rate and rhythm, normal S1 S2, no S3 or S4, no murmur, click or rub, no peripheral edema  ABDOMEN: soft, nontender, no hepatosplenomegaly, no masses and bowel sounds normal  MS: no gross musculoskeletal defects noted, no edema  SKIN: no suspicious lesions or rashes  NEURO: Normal strength and tone, mentation intact and speech normal  PSYCH: mentation appears normal, affect normal/bright    Results for orders placed or performed in visit on 04/23/24 (from the past 24 hour(s))   Hemoglobin A1c   Result Value Ref Range    Hemoglobin A1C 6.1 (H) 0.0 - 5.6 %           Signed Electronically by: Johana Tamez MD    " [de-identified] : Pt here for PE.\par Had robotic surgery of the ventral hernia s/p colon cancer surgery.  Dr. Dennis.  Went well.  June 10th 2022.\par Saw nephrology for kidney function.  No changes to regimen.\par Still seeing oncology for cancer.  Looking at platelet counts and cancer labs.  Stable with follow up.\par Sees pulm (Dr. Sandy).  Gets shortness of breath but transient.  Has f/u.\par Sees psych for bipolar.  No manic or depressive episodes for a while now.\par Takes three BP meds everyday.  Takes hydralazine only when he needs it.  Takes 1/2 tab of nadolol if his pulse is too low. Worries about BP and effects on health.  Wants to make medication changes today.\par Interested in shingrix and flu shot today.\par Sees eye specialist.  Has glaucoma.  Short sighted and wears glasses.  No cataracts.\par Has sleep apnea.  Uses CPAP nightly.\par

## 2024-05-20 DIAGNOSIS — J40 BRONCHITIS: ICD-10-CM

## 2024-05-20 RX ORDER — ALBUTEROL SULFATE 90 UG/1
2 AEROSOL, METERED RESPIRATORY (INHALATION) EVERY 6 HOURS PRN
Qty: 6.7 G | Refills: 4 | Status: SHIPPED | OUTPATIENT
Start: 2024-05-20

## 2024-06-03 ENCOUNTER — PATIENT OUTREACH (OUTPATIENT)
Dept: CARE COORDINATION | Facility: CLINIC | Age: 59
End: 2024-06-03
Payer: COMMERCIAL

## 2024-07-11 DIAGNOSIS — F17.210 CIGARETTE NICOTINE DEPENDENCE WITHOUT COMPLICATION: ICD-10-CM

## 2024-07-11 RX ORDER — VARENICLINE TARTRATE 1 MG/1
1 TABLET, FILM COATED ORAL 2 TIMES DAILY
Qty: 180 TABLET | Refills: 0 | Status: CANCELLED | OUTPATIENT
Start: 2024-07-11

## 2024-07-12 NOTE — TELEPHONE ENCOUNTER
Clinic RN: Please contact patient because patient should have run out of this medication on 7/2022. Confirm patient is taking this medication as prescribed. Document findings and route refill encounter to provider for approval or denial.     Delmar Easton, RN, BSN, MSN  RN Lead

## 2024-07-12 NOTE — TELEPHONE ENCOUNTER
Left message for patient to call [facility] back  When patient calls back please transfer to an RN  Nicky CALLE RN

## 2024-07-16 DIAGNOSIS — R06.2 WHEEZING: ICD-10-CM

## 2024-07-16 RX ORDER — IPRATROPIUM BROMIDE AND ALBUTEROL SULFATE 2.5; .5 MG/3ML; MG/3ML
SOLUTION RESPIRATORY (INHALATION)
Qty: 360 ML | Refills: 1 | Status: SHIPPED | OUTPATIENT
Start: 2024-07-16

## 2024-07-25 DIAGNOSIS — Z71.6 ENCOUNTER FOR SMOKING CESSATION COUNSELING: Primary | ICD-10-CM

## 2024-07-26 RX ORDER — VARENICLINE TARTRATE 1 MG/1
1 TABLET, FILM COATED ORAL 2 TIMES DAILY
Qty: 60 TABLET | Refills: 1 | Status: SHIPPED | OUTPATIENT
Start: 2024-07-26 | End: 2024-09-17

## 2024-09-15 ENCOUNTER — HEALTH MAINTENANCE LETTER (OUTPATIENT)
Age: 59
End: 2024-09-15

## 2024-09-17 DIAGNOSIS — Z71.6 ENCOUNTER FOR SMOKING CESSATION COUNSELING: ICD-10-CM

## 2024-09-17 RX ORDER — VARENICLINE TARTRATE 1 MG/1
1 TABLET, FILM COATED ORAL 2 TIMES DAILY
Qty: 60 TABLET | Refills: 1 | Status: SHIPPED | OUTPATIENT
Start: 2024-09-17

## 2024-10-17 DIAGNOSIS — I10 PRIMARY HYPERTENSION: ICD-10-CM

## 2024-10-17 DIAGNOSIS — E78.00 HYPERCHOLESTEROLEMIA: ICD-10-CM

## 2024-10-18 RX ORDER — ATORVASTATIN CALCIUM 40 MG/1
40 TABLET, FILM COATED ORAL DAILY
Qty: 90 TABLET | Refills: 0 | Status: SHIPPED | OUTPATIENT
Start: 2024-10-18

## 2024-10-18 RX ORDER — LOSARTAN POTASSIUM 100 MG/1
100 TABLET ORAL DAILY
Qty: 90 TABLET | Refills: 0 | Status: SHIPPED | OUTPATIENT
Start: 2024-10-18

## 2024-10-28 DIAGNOSIS — E11.9 TYPE 2 DIABETES MELLITUS WITHOUT COMPLICATION, WITHOUT LONG-TERM CURRENT USE OF INSULIN (H): ICD-10-CM

## 2024-10-28 RX ORDER — METFORMIN HYDROCHLORIDE 500 MG/1
TABLET, EXTENDED RELEASE ORAL
Qty: 180 TABLET | Refills: 1 | Status: SHIPPED | OUTPATIENT
Start: 2024-10-28

## 2024-11-12 NOTE — TELEPHONE ENCOUNTER
----- Message from Roberta Irby MD sent at 11/12/2024  1:30 PM EST -----  Please call with neg results. Ok to call parent.   MTM referral from: [facility] visit (referral by provider)    MTM referral outreach attempt #2 on August 18, 2021 at 4:37 PM      Outcome: Patient not reachable after several attempts, will route to MTM Pharmacist/Provider as an FYI. Thank you for the referral.    Gage Rehman, MTM coordinator

## 2024-11-13 ENCOUNTER — OFFICE VISIT (OUTPATIENT)
Dept: ORTHOPEDICS | Facility: CLINIC | Age: 59
End: 2024-11-13
Payer: COMMERCIAL

## 2024-11-13 DIAGNOSIS — M79.645 BILATERAL THUMB PAIN: Primary | ICD-10-CM

## 2024-11-13 DIAGNOSIS — M79.644 BILATERAL THUMB PAIN: Primary | ICD-10-CM

## 2024-11-13 PROCEDURE — 20604 DRAIN/INJ JOINT/BURSA W/US: CPT | Mod: 50 | Performed by: FAMILY MEDICINE

## 2024-11-13 RX ADMIN — BETAMETHASONE SODIUM PHOSPHATE AND BETAMETHASONE ACETATE 6 MG: 3; 3 INJECTION, SUSPENSION INTRA-ARTICULAR; INTRALESIONAL; INTRAMUSCULAR; SOFT TISSUE at 16:07

## 2024-11-13 NOTE — LETTER
11/13/2024      Martin Anguiano  [address]  [place] [address]      Dear Colleague,    Thank you for referring your patient, Martin Anguiano, to the [facility] SPORTS MEDICINE CLINIC [place]. Please see a copy of my visit note below.    [facility]  SPORTS & ORTHOPEDIC CLINIC VISIT     Nov 13, 2024      Ultrasound-guided bilateral first CMC injections    Date/Time: 11/13/2024 4:07 PM    Performed by: Isaiah Guy MD  Authorized by: Isaiah Guy MD    Indications:  Pain  Needle Size:  25 G  Guidance: ultrasound    Approach:  Dorsal  Condition: osteoarthritis    Laterality:  Bilateral  Location:  Thumb  Site:  Bilateral thumb CMC    Medications (Right):  6 mg betamethasone acet & sod phos 6 (3-3) MG/ML  Medications (Left):  6 mg betamethasone acet & sod phos 6 (3-3) MG/ML  Outcome:  Tolerated well, no immediate complications  Procedure discussed: discussed risks, benefits, and alternatives    Consent Given by:  Patient  Timeout: timeout called immediately prior to procedure    Prep: patient was prepped and draped in usual sterile fashion      Patient was positioned with the right hand in a neutral position on exam table.  The area overlying the first CMC joint was cleaned with chlorhexidine swab.  Next using ultrasound guidance the CMC joint was identified.  Ultrasound was necessary due to the degree of osteoarthritis and the small size of the joint space.  The skin was anesthetized with ethyl chloride spray.  Next using direct and continuous ultrasound guidance a 25-gauge needle was introduced into the joint space.  A solution of 6 mg Celestone and 1 mL 1% lidocaine was injected and seen flowing into the joint space.  Images were captured and saved to the permanent record.  The area was covered with a bandage.  Next the identical procedure was repeated on the left first CMC joint.  The patient tolerated the procedure well.  There were no  immediate complications.  Routine postinjection instructions were given to the patient.  Recommended follow-up should she develop any significant increase in pain, redness, swelling or drainage from the injection site.        Again, thank you for allowing me to participate in the care of your patient.        Sincerely,        Isaiah Guy MD

## 2024-11-13 NOTE — PROGRESS NOTES
Hand / Upper Extremity Injection/Arthrocentesis: bilateral thumb CMC    Date/Time: 11/13/2024 4:07 PM    Performed by: Isaiah Guy MD  Authorized by: Isaiah Guy MD    Indications:  Pain  Needle Size:  25 G  Guidance: ultrasound    Condition: osteoarthritis    Laterality:  Bilateral  Location:  Thumb  Site:  Bilateral thumb CMC    Medications (Right):  6 mg betamethasone acet & sod phos 6 (3-3) MG/ML  Medications (Left):  6 mg betamethasone acet & sod phos 6 (3-3) MG/ML  Outcome:  Tolerated well, no immediate complications  Procedure discussed: discussed risks, benefits, and alternatives    Consent Given by:  Patient  Timeout: timeout called immediately prior to procedure    Prep: patient was prepped and draped in usual sterile fashion

## 2024-11-17 RX ORDER — BETAMETHASONE SODIUM PHOSPHATE AND BETAMETHASONE ACETATE 3; 3 MG/ML; MG/ML
6 INJECTION, SUSPENSION INTRA-ARTICULAR; INTRALESIONAL; INTRAMUSCULAR; SOFT TISSUE
Status: COMPLETED | OUTPATIENT
Start: 2024-11-13 | End: 2024-11-13

## 2024-11-19 ENCOUNTER — THERAPY VISIT (OUTPATIENT)
Dept: OCCUPATIONAL THERAPY | Facility: CLINIC | Age: 59
End: 2024-11-19
Attending: FAMILY MEDICINE
Payer: COMMERCIAL

## 2024-11-19 DIAGNOSIS — M79.644 BILATERAL THUMB PAIN: ICD-10-CM

## 2024-11-19 DIAGNOSIS — M79.645 BILATERAL THUMB PAIN: ICD-10-CM

## 2024-11-19 PROCEDURE — 97165 OT EVAL LOW COMPLEX 30 MIN: CPT | Mod: GO

## 2024-11-19 PROCEDURE — 97760 ORTHOTIC MGMT&TRAING 1ST ENC: CPT | Mod: GO

## 2024-11-19 NOTE — PROGRESS NOTES
OCCUPATIONAL THERAPY EVALUATION  Type of Visit: Evaluation       Fall Risk Screen:  Fall screen completed by: OT  Have you fallen 2 or more times in the past year?: No  Have you fallen and had an injury in the past year?: No  Is patient a fall risk?: No    Subjective        Presenting condition or subjective complaint: (Patient-Rptd) brace  Date of onset: 10/29/24 (Order date)    Past Medical History:   Diagnosis Date    HTN (hypertension)     Hypercholesteremia     JOJO (obstructive sleep apnea)     Pre-diabetes      Dates & types of surgery: (Patient-Rptd) none    Prior diagnostic imaging/testing results: (Patient-Rptd) X-ray; Other     Prior therapy history for the same diagnosis, illness or injury:     Yes, November 2022    Prior Level of Function  Transfers: Independent  Ambulation: Independent  ADL: Independent  IADL: Driving, independent    Living Environment  Social support: (Patient-Rptd) With a significant other or spouse   Type of home: (Patient-Rptd) Apartment/condo   Stairs to enter the home:       None listed  Ramp: (Patient-Rptd) No   Stairs inside the home: (Patient-Rptd) Yes   Is there a railing: (Patient-Rptd) Yes     Help at home: (Patient-Rptd) None  Equipment owned:   None listed    Employment: (Patient-Rptd) Yes (Patient-Rptd) contracter  Hobbies/Interests: (Patient-Rptd) working    Patient goals for therapy: (Patient-Rptd) not have pain    Pain assessment: Pain present  See objective evaluation for additional pain details     Objective   ADDITIONAL HISTORY:  Right hand dominant  Patient reports symptoms of pain, stiffness/loss of motion, numbness, and tingling   Transportation: drives  Currently working in normal job without restrictions    PAIN:  Pain Level at Rest: 2/10  Pain Level with Use: 9/10  Pain Location: thumb and thenars  Pain Quality: Aching and Shooting  Pain Frequency: constant  Pain is Worst: daytime  Pain is Exacerbated By: working, using, writing, gripping  Pain is Relieved By:  injection, THC/CBD cream, advil  Pain Progression: Unchanged    ROM:   Thumb ROM  Left AROM Right AROM    MP Joint 0/55 0/55   IP Joint  0/58 0/54   Radial Abduction 44 46   Palmar Abduction 50 44   Kapandji Opposition Scale (0-10/10) 10 9     STRENGTH:     Measured in pounds 11/19/2024 11/19/2024    Left Right   Trial 1 83 84 +     Lateral Pinch  Measured in pounds 11/19/2024 11/19/2024    Left Right   Trial 1 16 15 +   Per observation, hyperextension noted in B thumb MPs    3 Point Pinch  Measured in pounds 11/19/2024 11/19/2024    Left Right   Trial 1 7 + 8 +     Assessment & Plan   CLINICAL IMPRESSIONS  Medical Diagnosis: Bilateral thumb pain    Treatment Diagnosis: Bilateral thumb pain    Impression/Assessment: Pt is a 59 year old male presenting to Occupational Therapy due to bilateral thumb pain.  The following significant findings have been identified: Impaired ROM, Impaired strength, and Pain.  These identified deficits interfere with their ability to perform self care tasks, work tasks, household chores,  yard work, and meal planning and preparation as compared to previous level of function.   Patient's limitations or Problem List includes: Pain, Decreased ROM/motion, Weakness, Decreased , Decreased pinch, Tightness in musculature, and Adherence in connective tissue of the bilateral thumb which interferes with the patient's ability to perform Self Care Tasks (hygiene/toileting), Work Tasks, Sleep Patterns, and Household Chores as compared to previous level of function.    Clinical Decision Making (Complexity):  Assessment of Occupational Performance: 3-5 Performance Deficits  Occupational Performance Limitations: hygiene and grooming, home establishment and management, shopping, sleep, and work  Clinical Decision Making (Complexity): Low complexity    PLAN OF CARE  Treatment Interventions:  Orthotic Fabrication:  Static and Hand based    Long Term Goals   OT Goal 1  Goal Identifier: Magda  orthotics  Goal Description: Pt will kendrick/doff bilateral orthotics independently in order to maximize independence with self-care tasks.  Rationale: In order to maximize safety and independence with performance of self-care activities  Goal Progress: Pt independently donned/doffed orthotics.  Target Date: 11/19/24  Date Met: 11/19/24      Frequency of Treatment: 1 visit, plus additional visit to adjust orthotic if needed  Duration of Treatment: 3 months     Education Assessment: Learner/Method: Patient;Listening;Demonstration     Risks and benefits of evaluation/treatment have been explained.   Patient/Family/caregiver agrees with Plan of Care.     Evaluation Time:    OT Eval, Low Complexity Minutes (08013): 10  Signing Clinician: Katie Phan OT

## 2024-11-27 DIAGNOSIS — J45.30 MILD PERSISTENT ASTHMA WITHOUT COMPLICATION: ICD-10-CM

## 2024-11-28 RX ORDER — FLUTICASONE PROPIONATE AND SALMETEROL 250; 50 UG/1; UG/1
1 POWDER RESPIRATORY (INHALATION) DAILY
Qty: 60 EACH | Refills: 2 | Status: SHIPPED | OUTPATIENT
Start: 2024-11-28

## 2024-12-26 DIAGNOSIS — J40 BRONCHITIS: ICD-10-CM

## 2024-12-26 RX ORDER — ALBUTEROL SULFATE 90 UG/1
2 INHALANT RESPIRATORY (INHALATION) EVERY 6 HOURS PRN
Qty: 18 G | Refills: 0 | Status: SHIPPED | OUTPATIENT
Start: 2024-12-26

## 2025-01-11 ENCOUNTER — HEALTH MAINTENANCE LETTER (OUTPATIENT)
Age: 60
End: 2025-01-11

## 2025-01-21 DIAGNOSIS — E78.00 HYPERCHOLESTEROLEMIA: ICD-10-CM

## 2025-01-21 DIAGNOSIS — I10 PRIMARY HYPERTENSION: ICD-10-CM

## 2025-01-21 RX ORDER — LOSARTAN POTASSIUM 100 MG/1
100 TABLET ORAL DAILY
Qty: 90 TABLET | Refills: 0 | Status: SHIPPED | OUTPATIENT
Start: 2025-01-21

## 2025-01-21 RX ORDER — ATORVASTATIN CALCIUM 40 MG/1
40 TABLET, FILM COATED ORAL DAILY
Qty: 90 TABLET | Refills: 0 | Status: SHIPPED | OUTPATIENT
Start: 2025-01-21

## 2025-03-09 ENCOUNTER — HEALTH MAINTENANCE LETTER (OUTPATIENT)
Age: 60
End: 2025-03-09

## 2025-04-26 ENCOUNTER — HEALTH MAINTENANCE LETTER (OUTPATIENT)
Age: 60
End: 2025-04-26

## 2025-04-29 DIAGNOSIS — E78.00 HYPERCHOLESTEROLEMIA: ICD-10-CM

## 2025-04-29 DIAGNOSIS — I10 PRIMARY HYPERTENSION: ICD-10-CM

## 2025-04-29 RX ORDER — ATORVASTATIN CALCIUM 40 MG/1
40 TABLET, FILM COATED ORAL DAILY
Qty: 90 TABLET | Refills: 0 | Status: SHIPPED | OUTPATIENT
Start: 2025-04-29

## 2025-04-29 RX ORDER — LOSARTAN POTASSIUM 100 MG/1
100 TABLET ORAL DAILY
Qty: 90 TABLET | Refills: 0 | Status: SHIPPED | OUTPATIENT
Start: 2025-04-29

## 2025-05-02 DIAGNOSIS — E11.9 TYPE 2 DIABETES MELLITUS WITHOUT COMPLICATION, WITHOUT LONG-TERM CURRENT USE OF INSULIN (H): ICD-10-CM

## 2025-05-05 RX ORDER — METFORMIN HYDROCHLORIDE 500 MG/1
TABLET, EXTENDED RELEASE ORAL
Qty: 60 TABLET | Refills: 0 | Status: SHIPPED | OUTPATIENT
Start: 2025-05-05

## 2025-05-27 DIAGNOSIS — E11.9 TYPE 2 DIABETES MELLITUS WITHOUT COMPLICATION, WITHOUT LONG-TERM CURRENT USE OF INSULIN (H): ICD-10-CM

## 2025-05-27 RX ORDER — METFORMIN HYDROCHLORIDE 500 MG/1
500 TABLET, EXTENDED RELEASE ORAL 2 TIMES DAILY WITH MEALS
Qty: 60 TABLET | Refills: 0 | Status: SHIPPED | OUTPATIENT
Start: 2025-05-27

## 2025-05-27 RX ORDER — METFORMIN HYDROCHLORIDE 500 MG/1
TABLET, EXTENDED RELEASE ORAL
Qty: 60 TABLET | Refills: 0 | OUTPATIENT
Start: 2025-05-27

## 2025-05-27 NOTE — TELEPHONE ENCOUNTER
LS,    Patient scheduled for visit 06/04  Never got refill sent in may  Pended if approved    Thanks,  Micki ROWE RN

## 2025-06-04 ENCOUNTER — OFFICE VISIT (OUTPATIENT)
Dept: FAMILY MEDICINE | Facility: CLINIC | Age: 60
End: 2025-06-04
Payer: COMMERCIAL

## 2025-06-04 VITALS
BODY MASS INDEX: 37.24 KG/M2 | RESPIRATION RATE: 16 BRPM | HEART RATE: 61 BPM | TEMPERATURE: 97.7 F | HEIGHT: 71 IN | OXYGEN SATURATION: 96 % | WEIGHT: 266 LBS | SYSTOLIC BLOOD PRESSURE: 129 MMHG | DIASTOLIC BLOOD PRESSURE: 88 MMHG

## 2025-06-04 DIAGNOSIS — E11.9 TYPE 2 DIABETES MELLITUS WITHOUT COMPLICATION, WITHOUT LONG-TERM CURRENT USE OF INSULIN (H): ICD-10-CM

## 2025-06-04 DIAGNOSIS — E66.01 MORBID OBESITY (H): ICD-10-CM

## 2025-06-04 DIAGNOSIS — E78.00 HYPERCHOLESTEROLEMIA: ICD-10-CM

## 2025-06-04 DIAGNOSIS — Z82.49 FAMILY HISTORY OF ISCHEMIC HEART DISEASE: ICD-10-CM

## 2025-06-04 DIAGNOSIS — R93.1 ELEVATED CORONARY ARTERY CALCIUM SCORE: ICD-10-CM

## 2025-06-04 DIAGNOSIS — J45.30 MILD PERSISTENT ASTHMA WITHOUT COMPLICATION: ICD-10-CM

## 2025-06-04 DIAGNOSIS — I10 BENIGN ESSENTIAL HYPERTENSION: ICD-10-CM

## 2025-06-04 DIAGNOSIS — R09.82 POST-NASAL DRIP: ICD-10-CM

## 2025-06-04 DIAGNOSIS — Z00.00 ENCOUNTER FOR ROUTINE ADULT HEALTH EXAMINATION WITHOUT ABNORMAL FINDINGS: Primary | ICD-10-CM

## 2025-06-04 PROBLEM — J42 CHRONIC BRONCHITIS, UNSPECIFIED CHRONIC BRONCHITIS TYPE (H): Status: RESOLVED | Noted: 2025-06-04 | Resolved: 2025-06-04

## 2025-06-04 PROBLEM — J42 CHRONIC BRONCHITIS, UNSPECIFIED CHRONIC BRONCHITIS TYPE (H): Status: ACTIVE | Noted: 2025-06-04

## 2025-06-04 LAB
ALBUMIN SERPL BCG-MCNC: 4.6 G/DL (ref 3.5–5.2)
ALP SERPL-CCNC: 91 U/L (ref 40–150)
ALT SERPL W P-5'-P-CCNC: 66 U/L (ref 0–70)
ANION GAP SERPL CALCULATED.3IONS-SCNC: 12 MMOL/L (ref 7–15)
AST SERPL W P-5'-P-CCNC: 48 U/L (ref 0–45)
BILIRUB SERPL-MCNC: 0.6 MG/DL
BUN SERPL-MCNC: 16.1 MG/DL (ref 6–20)
CALCIUM SERPL-MCNC: 9.5 MG/DL (ref 8.8–10.4)
CHLORIDE SERPL-SCNC: 105 MMOL/L (ref 98–107)
CHOLEST SERPL-MCNC: 126 MG/DL
CREAT SERPL-MCNC: 1.08 MG/DL (ref 0.67–1.17)
CREAT UR-MCNC: 37 MG/DL
EGFRCR SERPLBLD CKD-EPI 2021: 80 ML/MIN/1.73M2
EST. AVERAGE GLUCOSE BLD GHB EST-MCNC: 131 MG/DL
FASTING STATUS PATIENT QL REPORTED: YES
FASTING STATUS PATIENT QL REPORTED: YES
GLUCOSE SERPL-MCNC: 121 MG/DL (ref 70–99)
HBA1C MFR BLD: 6.2 % (ref 0–5.6)
HCO3 SERPL-SCNC: 24 MMOL/L (ref 22–29)
HDLC SERPL-MCNC: 51 MG/DL
LDLC SERPL CALC-MCNC: 61 MG/DL
MICROALBUMIN UR-MCNC: <12 MG/L
MICROALBUMIN/CREAT UR: NORMAL MG/G{CREAT}
NONHDLC SERPL-MCNC: 75 MG/DL
POTASSIUM SERPL-SCNC: 4.6 MMOL/L (ref 3.4–5.3)
PROT SERPL-MCNC: 7.5 G/DL (ref 6.4–8.3)
PSA SERPL DL<=0.01 NG/ML-MCNC: 1.23 NG/ML (ref 0–3.5)
SODIUM SERPL-SCNC: 141 MMOL/L (ref 135–145)
TRIGL SERPL-MCNC: 71 MG/DL

## 2025-06-04 RX ORDER — ALBUTEROL SULFATE 90 UG/1
2 INHALANT RESPIRATORY (INHALATION) EVERY 6 HOURS PRN
Qty: 6.7 G | Refills: 4 | Status: SHIPPED | OUTPATIENT
Start: 2025-06-04

## 2025-06-04 RX ORDER — FLUTICASONE PROPIONATE AND SALMETEROL 250; 50 UG/1; UG/1
1 POWDER RESPIRATORY (INHALATION) DAILY
Qty: 60 EACH | Refills: 2 | Status: CANCELLED | OUTPATIENT
Start: 2025-06-04

## 2025-06-04 RX ORDER — METFORMIN HYDROCHLORIDE 500 MG/1
TABLET, EXTENDED RELEASE ORAL
Qty: 180 TABLET | Refills: 3 | Status: SHIPPED | OUTPATIENT
Start: 2025-06-04

## 2025-06-04 RX ORDER — FLUTICASONE PROPIONATE AND SALMETEROL XINAFOATE 115; 21 UG/1; UG/1
AEROSOL, METERED RESPIRATORY (INHALATION)
Status: CANCELLED | OUTPATIENT
Start: 2025-06-04

## 2025-06-04 RX ORDER — IPRATROPIUM BROMIDE AND ALBUTEROL SULFATE 2.5; .5 MG/3ML; MG/3ML
SOLUTION RESPIRATORY (INHALATION)
Qty: 360 ML | Refills: 1 | Status: SHIPPED | OUTPATIENT
Start: 2025-06-04

## 2025-06-04 RX ORDER — FLUTICASONE PROPIONATE 50 MCG
1 SPRAY, SUSPENSION (ML) NASAL DAILY
Qty: 48 G | Refills: 2 | Status: SHIPPED | OUTPATIENT
Start: 2025-06-04

## 2025-06-04 RX ORDER — FLUTICASONE PROPIONATE AND SALMETEROL 250; 50 UG/1; UG/1
1 POWDER RESPIRATORY (INHALATION) DAILY
Qty: 60 EACH | Refills: 5 | Status: SHIPPED | OUTPATIENT
Start: 2025-06-04

## 2025-06-04 RX ORDER — LOSARTAN POTASSIUM 100 MG/1
100 TABLET ORAL DAILY
Qty: 90 TABLET | Refills: 3 | Status: SHIPPED | OUTPATIENT
Start: 2025-06-04

## 2025-06-04 SDOH — HEALTH STABILITY: PHYSICAL HEALTH: ON AVERAGE, HOW MANY DAYS PER WEEK DO YOU ENGAGE IN MODERATE TO STRENUOUS EXERCISE (LIKE A BRISK WALK)?: 5 DAYS

## 2025-06-04 ASSESSMENT — SOCIAL DETERMINANTS OF HEALTH (SDOH): HOW OFTEN DO YOU GET TOGETHER WITH FRIENDS OR RELATIVES?: THREE TIMES A WEEK

## 2025-06-04 ASSESSMENT — PAIN SCALES - GENERAL: PAINLEVEL_OUTOF10: NO PAIN (0)

## 2025-06-04 ASSESSMENT — ASTHMA QUESTIONNAIRES
ACT_TOTALSCORE: 17
QUESTION_1 LAST FOUR WEEKS HOW MUCH OF THE TIME DID YOUR ASTHMA KEEP YOU FROM GETTING AS MUCH DONE AT WORK, SCHOOL OR AT HOME: A LITTLE OF THE TIME
QUESTION_4 LAST FOUR WEEKS HOW OFTEN HAVE YOU USED YOUR RESCUE INHALER OR NEBULIZER MEDICATION (SUCH AS ALBUTEROL): ONE OR TWO TIMES PER DAY
QUESTION_2 LAST FOUR WEEKS HOW OFTEN HAVE YOU HAD SHORTNESS OF BREATH: ONCE OR TWICE A WEEK
QUESTION_3 LAST FOUR WEEKS HOW OFTEN DID YOUR ASTHMA SYMPTOMS (WHEEZING, COUGHING, SHORTNESS OF BREATH, CHEST TIGHTNESS OR PAIN) WAKE YOU UP AT NIGHT OR EARLIER THAN USUAL IN THE MORNING: ONCE OR TWICE
QUESTION_5 LAST FOUR WEEKS HOW WOULD YOU RATE YOUR ASTHMA CONTROL: SOMEWHAT CONTROLLED
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
## 2025-06-05 ENCOUNTER — RESULTS FOLLOW-UP (OUTPATIENT)
Dept: FAMILY MEDICINE | Facility: CLINIC | Age: 60
End: 2025-06-05

## 2025-06-05 ENCOUNTER — PATIENT OUTREACH (OUTPATIENT)
Dept: CARE COORDINATION | Facility: CLINIC | Age: 60
End: 2025-06-05
Payer: COMMERCIAL

## 2025-06-09 ENCOUNTER — PATIENT OUTREACH (OUTPATIENT)
Dept: CARE COORDINATION | Facility: CLINIC | Age: 60
End: 2025-06-09
Payer: COMMERCIAL

## 2025-06-16 DIAGNOSIS — E11.9 TYPE 2 DIABETES MELLITUS WITHOUT COMPLICATION, WITHOUT LONG-TERM CURRENT USE OF INSULIN (H): ICD-10-CM

## 2025-06-16 DIAGNOSIS — Z71.6 ENCOUNTER FOR SMOKING CESSATION COUNSELING: ICD-10-CM

## 2025-06-16 RX ORDER — VARENICLINE TARTRATE 1 MG/1
1 TABLET, FILM COATED ORAL 2 TIMES DAILY
Qty: 60 TABLET | Refills: 1 | Status: SHIPPED | OUTPATIENT
Start: 2025-06-16

## 2025-06-16 NOTE — TELEPHONE ENCOUNTER
Refill request for CHANTIX  Pt hasn't been taking it for a while but would like to restart    Order pended    Thanks   Malgorzata GOMES RN

## 2025-06-16 NOTE — TELEPHONE ENCOUNTER
Clinic RN: Please investigate patient's chart or contact patient if the information cannot be found because patient should have run out of this medication on 11/2024. Confirm patient is taking this medication as prescribed. Document findings and route refill encounter to provider for approval or denial.  Thanks,  Rubi ALVAARDO RN

## 2025-06-16 NOTE — TELEPHONE ENCOUNTER
Per note below - pt reports break in medication.   Routing refill request to PCP to address    Emily WASHINGTON RN

## 2025-06-17 RX ORDER — METFORMIN HYDROCHLORIDE 500 MG/1
500 TABLET, EXTENDED RELEASE ORAL 2 TIMES DAILY WITH MEALS
Qty: 180 TABLET | Refills: 1 | OUTPATIENT
Start: 2025-06-17

## 2025-06-21 DIAGNOSIS — E11.9 TYPE 2 DIABETES MELLITUS WITHOUT COMPLICATION, WITHOUT LONG-TERM CURRENT USE OF INSULIN (H): ICD-10-CM

## 2025-06-23 RX ORDER — METFORMIN HYDROCHLORIDE 500 MG/1
500 TABLET, EXTENDED RELEASE ORAL 2 TIMES DAILY WITH MEALS
Qty: 180 TABLET | Refills: 2 | Status: SHIPPED | OUTPATIENT
Start: 2025-06-23

## (undated) RX ORDER — FENTANYL CITRATE 50 UG/ML
INJECTION, SOLUTION INTRAMUSCULAR; INTRAVENOUS
Status: DISPENSED
Start: 2018-07-31